# Patient Record
Sex: FEMALE | Race: WHITE | NOT HISPANIC OR LATINO | Employment: FULL TIME | URBAN - METROPOLITAN AREA
[De-identification: names, ages, dates, MRNs, and addresses within clinical notes are randomized per-mention and may not be internally consistent; named-entity substitution may affect disease eponyms.]

---

## 2017-05-12 ENCOUNTER — TRANSCRIBE ORDERS (OUTPATIENT)
Dept: ADMINISTRATIVE | Facility: HOSPITAL | Age: 27
End: 2017-05-12

## 2017-05-12 DIAGNOSIS — R94.6 NONSPECIFIC ABNORMAL RESULTS OF THYROID FUNCTION STUDY: Primary | ICD-10-CM

## 2017-05-16 ENCOUNTER — HOSPITAL ENCOUNTER (OUTPATIENT)
Dept: RADIOLOGY | Age: 27
Discharge: HOME | End: 2017-05-16
Payer: COMMERCIAL

## 2017-05-16 DIAGNOSIS — R94.6 NONSPECIFIC ABNORMAL RESULTS OF THYROID FUNCTION STUDY: ICD-10-CM

## 2017-05-16 PROCEDURE — 76536 US EXAM OF HEAD AND NECK: CPT

## 2017-05-19 ENCOUNTER — TRANSCRIBE ORDERS (OUTPATIENT)
Dept: ADMINISTRATIVE | Facility: HOSPITAL | Age: 27
End: 2017-05-19

## 2017-05-19 DIAGNOSIS — M79.7 RHEUMATISM, UNSPECIFIED AND FIBROSITIS: Primary | ICD-10-CM

## 2017-05-19 DIAGNOSIS — M79.0 RHEUMATISM, UNSPECIFIED AND FIBROSITIS: Primary | ICD-10-CM

## 2017-06-02 ENCOUNTER — HOSPITAL ENCOUNTER (OUTPATIENT)
Dept: SLEEP CENTER | Facility: CLINIC | Age: 27
Discharge: HOME | End: 2017-06-02
Payer: COMMERCIAL

## 2017-06-02 DIAGNOSIS — M79.7 RHEUMATISM, UNSPECIFIED AND FIBROSITIS: ICD-10-CM

## 2017-06-02 DIAGNOSIS — M79.0 RHEUMATISM, UNSPECIFIED AND FIBROSITIS: ICD-10-CM

## 2017-06-02 DIAGNOSIS — G47.33 OSA (OBSTRUCTIVE SLEEP APNEA): ICD-10-CM

## 2017-06-02 PROCEDURE — 95810 POLYSOM 6/> YRS 4/> PARAM: CPT

## 2017-06-05 ENCOUNTER — TRANSCRIBE ORDERS (OUTPATIENT)
Dept: SLEEP CENTER | Facility: CLINIC | Age: 27
End: 2017-06-05

## 2017-06-05 DIAGNOSIS — R06.83 SNORING: Primary | ICD-10-CM

## 2017-11-25 ENCOUNTER — HOSPITAL ENCOUNTER (INPATIENT)
Facility: HOSPITAL | Age: 27
LOS: 3 days | Discharge: HOME | DRG: 254 | End: 2017-11-30
Attending: EMERGENCY MEDICINE | Admitting: HOSPITALIST
Payer: COMMERCIAL

## 2017-11-25 ENCOUNTER — APPOINTMENT (EMERGENCY)
Dept: RADIOLOGY | Facility: HOSPITAL | Age: 27
DRG: 254 | End: 2017-11-25
Payer: COMMERCIAL

## 2017-11-25 DIAGNOSIS — K44.9 HIATAL HERNIA: ICD-10-CM

## 2017-11-25 DIAGNOSIS — R10.13 EPIGASTRIC PAIN: ICD-10-CM

## 2017-11-25 DIAGNOSIS — Z90.49 HISTORY OF CHOLECYSTECTOMY: ICD-10-CM

## 2017-11-25 DIAGNOSIS — R10.9 INTRACTABLE ABDOMINAL PAIN: Primary | ICD-10-CM

## 2017-11-25 DIAGNOSIS — R11.2 NAUSEA & VOMITING: ICD-10-CM

## 2017-11-25 DIAGNOSIS — F41.9 ANXIETY: ICD-10-CM

## 2017-11-25 DIAGNOSIS — R10.9 ABDOMINAL PAIN: ICD-10-CM

## 2017-11-25 PROBLEM — M79.7 FIBROMYALGIA MUSCLE PAIN: Chronic | Status: ACTIVE | Noted: 2017-11-25

## 2017-11-25 PROBLEM — L40.50 PSORIATIC ARTHRITIS (HCC): Chronic | Status: ACTIVE | Noted: 2017-11-25

## 2017-11-25 PROBLEM — N80.9 ENDOMETRIOSIS: Chronic | Status: ACTIVE | Noted: 2017-11-25

## 2017-11-25 LAB
ALBUMIN SERPL BCP-MCNC: 3.8 G/DL (ref 3.5–5)
ALP SERPL-CCNC: 56 U/L (ref 46–116)
ALT SERPL W P-5'-P-CCNC: 26 U/L (ref 12–78)
ANION GAP BLD CALC-SCNC: 14 MMOL/L (ref 4–13)
ANION GAP SERPL CALCULATED.3IONS-SCNC: 6 MMOL/L (ref 4–13)
AST SERPL W P-5'-P-CCNC: 10 U/L (ref 5–45)
BACTERIA UR QL AUTO: NORMAL /HPF
BASOPHILS # BLD AUTO: 0.04 THOUSANDS/ΜL (ref 0–0.1)
BASOPHILS NFR BLD AUTO: 1 % (ref 0–1)
BILIRUB SERPL-MCNC: 0.57 MG/DL (ref 0.2–1)
BILIRUB UR QL STRIP: NEGATIVE
BUN BLD-MCNC: 13 MG/DL (ref 5–25)
BUN SERPL-MCNC: 11 MG/DL (ref 5–25)
CA-I BLD-SCNC: 1.15 MMOL/L (ref 1.12–1.32)
CALCIUM SERPL-MCNC: 9.1 MG/DL (ref 8.3–10.1)
CHLORIDE BLD-SCNC: 105 MMOL/L (ref 100–108)
CHLORIDE SERPL-SCNC: 106 MMOL/L (ref 100–108)
CLARITY UR: CLEAR
CO2 SERPL-SCNC: 27 MMOL/L (ref 21–32)
COLOR UR: YELLOW
CREAT BLD-MCNC: 0.9 MG/DL (ref 0.6–1.3)
CREAT SERPL-MCNC: 0.81 MG/DL (ref 0.6–1.3)
EOSINOPHIL # BLD AUTO: 0.15 THOUSAND/ΜL (ref 0–0.61)
EOSINOPHIL NFR BLD AUTO: 2 % (ref 0–6)
ERYTHROCYTE [DISTWIDTH] IN BLOOD BY AUTOMATED COUNT: 13.8 % (ref 11.6–15.1)
EXT PREG TEST URINE: NEGATIVE
GFR SERPL CREATININE-BSD FRML MDRD: 100 ML/MIN/1.73SQ M
GFR SERPL CREATININE-BSD FRML MDRD: 88 ML/MIN/1.73SQ M
GLUCOSE SERPL-MCNC: 90 MG/DL (ref 65–140)
GLUCOSE SERPL-MCNC: 92 MG/DL (ref 65–140)
GLUCOSE UR STRIP-MCNC: NEGATIVE MG/DL
HCT VFR BLD AUTO: 42.7 % (ref 34.8–46.1)
HCT VFR BLD CALC: 44 % (ref 34.8–46.1)
HGB BLD-MCNC: 14.3 G/DL (ref 11.5–15.4)
HGB BLDA-MCNC: 15 G/DL (ref 11.5–15.4)
HGB UR QL STRIP.AUTO: ABNORMAL
HYALINE CASTS #/AREA URNS LPF: NORMAL /LPF
KETONES UR STRIP-MCNC: NEGATIVE MG/DL
LEUKOCYTE ESTERASE UR QL STRIP: NEGATIVE
LIPASE SERPL-CCNC: 152 U/L (ref 73–393)
LYMPHOCYTES # BLD AUTO: 2.83 THOUSANDS/ΜL (ref 0.6–4.47)
LYMPHOCYTES NFR BLD AUTO: 41 % (ref 14–44)
MCH RBC QN AUTO: 29.1 PG (ref 26.8–34.3)
MCHC RBC AUTO-ENTMCNC: 33.5 G/DL (ref 31.4–37.4)
MCV RBC AUTO: 87 FL (ref 82–98)
MONOCYTES # BLD AUTO: 0.36 THOUSAND/ΜL (ref 0.17–1.22)
MONOCYTES NFR BLD AUTO: 5 % (ref 4–12)
NEUTROPHILS # BLD AUTO: 3.57 THOUSANDS/ΜL (ref 1.85–7.62)
NEUTS SEG NFR BLD AUTO: 51 % (ref 43–75)
NITRITE UR QL STRIP: NEGATIVE
NON-SQ EPI CELLS URNS QL MICRO: NORMAL /HPF
NRBC BLD AUTO-RTO: 0 /100 WBCS
PCO2 BLD: 26 MMOL/L (ref 21–32)
PH UR STRIP.AUTO: 7 [PH] (ref 4.5–8)
PLATELET # BLD AUTO: 286 THOUSANDS/UL (ref 149–390)
PMV BLD AUTO: 9.7 FL (ref 8.9–12.7)
POTASSIUM BLD-SCNC: 4.3 MMOL/L (ref 3.5–5.3)
POTASSIUM SERPL-SCNC: 4 MMOL/L (ref 3.5–5.3)
PROT SERPL-MCNC: 7.5 G/DL (ref 6.4–8.2)
PROT UR STRIP-MCNC: NEGATIVE MG/DL
RBC # BLD AUTO: 4.91 MILLION/UL (ref 3.81–5.12)
RBC #/AREA URNS AUTO: NORMAL /HPF
SODIUM BLD-SCNC: 140 MMOL/L (ref 136–145)
SODIUM SERPL-SCNC: 139 MMOL/L (ref 136–145)
SP GR UR STRIP.AUTO: 1.01 (ref 1–1.03)
SPECIMEN SOURCE: ABNORMAL
UROBILINOGEN UR QL STRIP.AUTO: 0.2 E.U./DL
WBC # BLD AUTO: 6.98 THOUSAND/UL (ref 4.31–10.16)
WBC #/AREA URNS AUTO: NORMAL /HPF

## 2017-11-25 PROCEDURE — 83690 ASSAY OF LIPASE: CPT | Performed by: EMERGENCY MEDICINE

## 2017-11-25 PROCEDURE — 85014 HEMATOCRIT: CPT

## 2017-11-25 PROCEDURE — 81025 URINE PREGNANCY TEST: CPT | Performed by: EMERGENCY MEDICINE

## 2017-11-25 PROCEDURE — 81001 URINALYSIS AUTO W/SCOPE: CPT

## 2017-11-25 PROCEDURE — 96375 TX/PRO/DX INJ NEW DRUG ADDON: CPT

## 2017-11-25 PROCEDURE — 81002 URINALYSIS NONAUTO W/O SCOPE: CPT | Performed by: EMERGENCY MEDICINE

## 2017-11-25 PROCEDURE — 74177 CT ABD & PELVIS W/CONTRAST: CPT

## 2017-11-25 PROCEDURE — 96374 THER/PROPH/DIAG INJ IV PUSH: CPT

## 2017-11-25 PROCEDURE — 80047 BASIC METABLC PNL IONIZED CA: CPT

## 2017-11-25 PROCEDURE — 80053 COMPREHEN METABOLIC PANEL: CPT | Performed by: EMERGENCY MEDICINE

## 2017-11-25 PROCEDURE — 36415 COLL VENOUS BLD VENIPUNCTURE: CPT | Performed by: EMERGENCY MEDICINE

## 2017-11-25 PROCEDURE — 85025 COMPLETE CBC W/AUTO DIFF WBC: CPT | Performed by: EMERGENCY MEDICINE

## 2017-11-25 PROCEDURE — 99285 EMERGENCY DEPT VISIT HI MDM: CPT

## 2017-11-25 RX ORDER — POLYETHYLENE GLYCOL 3350 17 G/17G
17 POWDER, FOR SOLUTION ORAL DAILY
Status: DISCONTINUED | OUTPATIENT
Start: 2017-11-26 | End: 2017-11-27

## 2017-11-25 RX ORDER — CYCLOBENZAPRINE HCL 5 MG
10 TABLET ORAL 3 TIMES DAILY PRN
COMMUNITY
End: 2017-12-02 | Stop reason: HOSPADM

## 2017-11-25 RX ORDER — ALPRAZOLAM 0.5 MG/1
1 TABLET ORAL 3 TIMES DAILY PRN
Status: DISCONTINUED | OUTPATIENT
Start: 2017-11-25 | End: 2017-11-26

## 2017-11-25 RX ORDER — MOMETASONE FUROATE 1 MG/G
1 CREAM TOPICAL 2 TIMES DAILY
COMMUNITY
End: 2019-10-23 | Stop reason: ALTCHOICE

## 2017-11-25 RX ORDER — DULOXETIN HYDROCHLORIDE 60 MG/1
60 CAPSULE, DELAYED RELEASE ORAL DAILY
Status: DISCONTINUED | OUTPATIENT
Start: 2017-11-26 | End: 2017-11-30 | Stop reason: HOSPADM

## 2017-11-25 RX ORDER — ONDANSETRON 2 MG/ML
4 INJECTION INTRAMUSCULAR; INTRAVENOUS ONCE
Status: COMPLETED | OUTPATIENT
Start: 2017-11-25 | End: 2017-11-25

## 2017-11-25 RX ORDER — HEPARIN SODIUM 5000 [USP'U]/ML
5000 INJECTION, SOLUTION INTRAVENOUS; SUBCUTANEOUS EVERY 8 HOURS SCHEDULED
Status: DISCONTINUED | OUTPATIENT
Start: 2017-11-25 | End: 2017-11-30 | Stop reason: HOSPADM

## 2017-11-25 RX ORDER — DULOXETIN HYDROCHLORIDE 60 MG/1
60 CAPSULE, DELAYED RELEASE ORAL DAILY
Status: ON HOLD | COMMUNITY
End: 2022-03-18 | Stop reason: ALTCHOICE

## 2017-11-25 RX ORDER — PANTOPRAZOLE SODIUM 40 MG/1
40 TABLET, DELAYED RELEASE ORAL
Status: DISCONTINUED | OUTPATIENT
Start: 2017-11-26 | End: 2017-11-26

## 2017-11-25 RX ORDER — FOLIC ACID 1 MG/1
1 TABLET ORAL DAILY
Status: DISCONTINUED | OUTPATIENT
Start: 2017-11-26 | End: 2017-11-30 | Stop reason: HOSPADM

## 2017-11-25 RX ORDER — ONDANSETRON 2 MG/ML
INJECTION INTRAMUSCULAR; INTRAVENOUS
Status: DISPENSED
Start: 2017-11-25 | End: 2017-11-26

## 2017-11-25 RX ORDER — MOMETASONE FUROATE 1 MG/G
1 CREAM TOPICAL DAILY
Status: DISCONTINUED | OUTPATIENT
Start: 2017-11-26 | End: 2017-11-30 | Stop reason: HOSPADM

## 2017-11-25 RX ORDER — DIAZEPAM 5 MG/1
5 TABLET ORAL EVERY 8 HOURS PRN
Status: DISCONTINUED | OUTPATIENT
Start: 2017-11-25 | End: 2017-11-30 | Stop reason: HOSPADM

## 2017-11-25 RX ORDER — PROMETHAZINE HYDROCHLORIDE 25 MG/ML
12.5 INJECTION, SOLUTION INTRAMUSCULAR; INTRAVENOUS ONCE
Status: COMPLETED | OUTPATIENT
Start: 2017-11-25 | End: 2017-11-25

## 2017-11-25 RX ORDER — PREGABALIN 100 MG/1
100 CAPSULE ORAL 2 TIMES DAILY
COMMUNITY
End: 2019-10-31 | Stop reason: ALTCHOICE

## 2017-11-25 RX ORDER — KETOROLAC TROMETHAMINE 30 MG/ML
15 INJECTION, SOLUTION INTRAMUSCULAR; INTRAVENOUS ONCE
Status: COMPLETED | OUTPATIENT
Start: 2017-11-25 | End: 2017-11-25

## 2017-11-25 RX ORDER — MELATONIN
1000 DAILY
Status: ON HOLD | COMMUNITY
End: 2022-03-18 | Stop reason: ALTCHOICE

## 2017-11-25 RX ORDER — ALPRAZOLAM 1 MG/1
1 TABLET ORAL 3 TIMES DAILY PRN
COMMUNITY
End: 2017-12-02 | Stop reason: HOSPADM

## 2017-11-25 RX ORDER — PREGABALIN 100 MG/1
100 CAPSULE ORAL 2 TIMES DAILY
Status: DISCONTINUED | OUTPATIENT
Start: 2017-11-25 | End: 2017-11-30 | Stop reason: HOSPADM

## 2017-11-25 RX ORDER — FOLIC ACID 1 MG/1
1 TABLET ORAL DAILY
Status: ON HOLD | COMMUNITY
End: 2022-03-18 | Stop reason: ALTCHOICE

## 2017-11-25 RX ORDER — OMEPRAZOLE 40 MG/1
40 CAPSULE, DELAYED RELEASE ORAL DAILY
Status: ON HOLD | COMMUNITY
End: 2022-03-18 | Stop reason: ALTCHOICE

## 2017-11-25 RX ORDER — ONDANSETRON 2 MG/ML
4 INJECTION INTRAMUSCULAR; INTRAVENOUS EVERY 8 HOURS PRN
Status: DISCONTINUED | OUTPATIENT
Start: 2017-11-25 | End: 2017-11-26

## 2017-11-25 RX ORDER — ACETAMINOPHEN 325 MG/1
650 TABLET ORAL EVERY 6 HOURS PRN
Status: DISCONTINUED | OUTPATIENT
Start: 2017-11-25 | End: 2017-11-26

## 2017-11-25 RX ORDER — MELATONIN
1000 DAILY
Status: DISCONTINUED | OUTPATIENT
Start: 2017-11-26 | End: 2017-11-30 | Stop reason: HOSPADM

## 2017-11-25 RX ORDER — DIAZEPAM 5 MG/1
5 TABLET ORAL EVERY 8 HOURS PRN
COMMUNITY
End: 2017-12-02 | Stop reason: HOSPADM

## 2017-11-25 RX ORDER — CLONAZEPAM 0.5 MG/1
0.5 TABLET ORAL 2 TIMES DAILY PRN
Status: ON HOLD | COMMUNITY
End: 2022-03-18 | Stop reason: ALTCHOICE

## 2017-11-25 RX ORDER — CYCLOBENZAPRINE HCL 10 MG
10 TABLET ORAL 3 TIMES DAILY PRN
Status: DISCONTINUED | OUTPATIENT
Start: 2017-11-25 | End: 2017-11-30 | Stop reason: HOSPADM

## 2017-11-25 RX ADMIN — ALPRAZOLAM 1 MG: 0.5 TABLET ORAL at 19:17

## 2017-11-25 RX ADMIN — PROMETHAZINE HYDROCHLORIDE 12.5 MG: 25 INJECTION INTRAMUSCULAR; INTRAVENOUS at 22:04

## 2017-11-25 RX ADMIN — KETOROLAC TROMETHAMINE 15 MG: 30 INJECTION, SOLUTION INTRAMUSCULAR at 16:48

## 2017-11-25 RX ADMIN — ONDANSETRON 4 MG: 2 INJECTION INTRAMUSCULAR; INTRAVENOUS at 12:56

## 2017-11-25 RX ADMIN — HYDROMORPHONE HYDROCHLORIDE 0.5 MG: 1 INJECTION, SOLUTION INTRAMUSCULAR; INTRAVENOUS; SUBCUTANEOUS at 19:23

## 2017-11-25 RX ADMIN — ONDANSETRON 4 MG: 2 INJECTION INTRAMUSCULAR; INTRAVENOUS at 14:48

## 2017-11-25 RX ADMIN — HEPARIN SODIUM 5000 UNITS: 5000 INJECTION, SOLUTION INTRAVENOUS; SUBCUTANEOUS at 19:26

## 2017-11-25 RX ADMIN — PREGABALIN 100 MG: 100 CAPSULE ORAL at 19:17

## 2017-11-25 RX ADMIN — HYDROMORPHONE HYDROCHLORIDE 0.5 MG: 1 INJECTION, SOLUTION INTRAMUSCULAR; INTRAVENOUS; SUBCUTANEOUS at 12:56

## 2017-11-25 RX ADMIN — ONDANSETRON 4 MG: 2 INJECTION INTRAMUSCULAR; INTRAVENOUS at 19:21

## 2017-11-25 RX ADMIN — HYDROMORPHONE HYDROCHLORIDE 0.5 MG: 1 INJECTION, SOLUTION INTRAMUSCULAR; INTRAVENOUS; SUBCUTANEOUS at 14:26

## 2017-11-25 RX ADMIN — IOHEXOL 100 ML: 350 INJECTION, SOLUTION INTRAVENOUS at 14:32

## 2017-11-25 RX ADMIN — HYDROMORPHONE HYDROCHLORIDE 0.5 MG: 1 INJECTION, SOLUTION INTRAMUSCULAR; INTRAVENOUS; SUBCUTANEOUS at 23:19

## 2017-11-25 NOTE — ED PROVIDER NOTES
History  Chief Complaint   Patient presents with    Abdominal Pain     Pt  comes to the ER for evaluation of upper epigastric abdominal pain that woke her up this morning; pt  reports nausea and pain with eating and drinking but denies acutally vomiting; pt  has extensive h/o of 2 years of being evaluated for abdominal pain with no exact cause; pt  has had pancreatic stents, galllbladder removal, feeding tube, etc, with no relief; pt  also recentely dx with fibromyalgia and arthritis      HPI    59-year-old female with history of cholecystectomy  Secondary to gangrenous gallbladder, GERDm pancreatic and biliary stent placement suture removed last year,  Presenting for evaluation of epigastric abdominal pain that began 1 week ago  Patient was seen at [facility] for epigastric abdominal pain and nausea/vomiting,   Discharged with Protonix  Patient states pain did not resolve, still has epigastric pain nausea vomiting  Denies any blood when she vomits, denies diarrhea or blood in the stool  Says Protonix has not been helping  No longer has a GI physician  Denies fevers chills chest pain shortness of breath vaginal discharge  Was recent diagnosis of reactive arthritis is on methotrexate  Unable to take Tylenol Motrin  Prior to Admission Medications   Prescriptions Last Dose Informant Patient Reported? Taking?    ALPRAZolam (XANAX) 1 mg tablet 11/25/2017 at Unknown time  Yes Yes   Sig: Take 1 mg by mouth 3 (three) times a day as needed for anxiety   DULoxetine (CYMBALTA) 60 mg delayed release capsule 11/25/2017 at Unknown time  Yes Yes   Sig: Take 60 mg by mouth daily   cholecalciferol (VITAMIN D3) 1,000 units tablet 11/25/2017 at Unknown time  Yes Yes   Sig: Take 1,000 Units by mouth daily   clonazePAM (KlonoPIN) 0 5 mg tablet 11/25/2017 at Unknown time  Yes Yes   Sig: Take 0 5 mg by mouth 2 (two) times a day as needed for seizures   cyclobenzaprine (FLEXERIL) 5 mg tablet 11/25/2017 at Unknown time Yes Yes   Sig: Take 10 mg by mouth 3 (three) times a day as needed for muscle spasms   diazepam (VALIUM) 5 mg tablet 11/25/2017 at Unknown time  Yes Yes   Sig: Take 5 mg by mouth every 8 (eight) hours as needed for anxiety   folic acid (FOLVITE) 1 mg tablet 11/25/2017 at Unknown time  Yes Yes   Sig: Take 1 mg by mouth daily   methotrexate 2 5 mg tablet 11/25/2017 at Unknown time  Yes Yes   Sig: Take 2 5 mg by mouth once a week   mometasone (ELOCON) 0 1 % cream 11/25/2017 at Unknown time  Yes Yes   Sig: Apply 1 application topically 2 (two) times a day   norethindrone (AYGESTIN) 5 mg tablet 11/25/2017 at Unknown time  Yes Yes   Sig: Take 5 mg by mouth daily   omeprazole (PriLOSEC) 40 MG capsule 11/25/2017 at Unknown time  Yes Yes   Sig: Take 40 mg by mouth daily   pregabalin (LYRICA) 100 mg capsule 11/25/2017 at Unknown time  Yes Yes   Sig: Take 100 mg by mouth 2 (two) times a day      Facility-Administered Medications: None       Past Medical History:   Diagnosis Date    Abdominal discomfort     Anxiety     Endometritis     Fibromyalgia     Psoriatic arthritis ([facility] [address] )        Past Surgical History:   Procedure Laterality Date    ABDOMINAL SURGERY         History reviewed  No pertinent family history  I have reviewed and agree with the history as documented  Social History   Substance Use Topics    Smoking status: Never Smoker    Smokeless tobacco: Never Used    Alcohol use No        Review of Systems   Constitutional: Negative for chills, fatigue and fever  HENT: Negative for sore throat  Eyes: Negative for redness and visual disturbance  Respiratory: Negative for shortness of breath  Cardiovascular: Negative for chest pain  Gastrointestinal: Positive for abdominal pain, nausea and vomiting  Negative for anal bleeding, blood in stool, constipation and diarrhea  Genitourinary: Negative for difficulty urinating, dysuria and pelvic pain  Musculoskeletal: Negative for back pain     Skin: Negative for rash  Neurological: Negative for syncope, weakness and headaches  All other systems reviewed and are negative  Physical Exam  ED Triage Vitals [11/25/17 1213]   Temperature Pulse Respirations Blood Pressure SpO2   98 °F (36 7 °C) 102 20 133/84 98 %      Temp Source Heart Rate Source Patient Position - Orthostatic VS BP Location FiO2 (%)   Oral Monitor Lying Right arm --      Pain Score       Worst Possible Pain           Orthostatic Vital Signs  Vitals:    11/25/17 1400 11/25/17 1526 11/25/17 1600 11/25/17 1630   BP: 138/92 130/85 139/85 132/83   Pulse: 94 88 94 96   Patient Position - Orthostatic VS: Lying Lying  Sitting       Physical Exam   Constitutional: She is oriented to person, place, and time  She appears well-developed and well-nourished  No distress  HENT:   Head: Normocephalic and atraumatic  Eyes: Conjunctivae and EOM are normal  Pupils are equal, round, and reactive to light  Cardiovascular: Normal rate, regular rhythm and normal heart sounds  No murmur heard  Pulmonary/Chest: No respiratory distress  Abdominal: Soft  Bowel sounds are normal  There is tenderness  Tenderness in epigastric region  Patient also has suprapubic tenderness  Musculoskeletal: Normal range of motion  Neurological: She is alert and oriented to person, place, and time  No cranial nerve deficit or sensory deficit  She exhibits normal muscle tone  Coordination normal    Skin: Skin is warm and dry  Capillary refill takes less than 2 seconds  No rash noted  Psychiatric: She has a normal mood and affect  Nursing note and vitals reviewed        ED Medications  Medications    EMS REPLENISHMENT MED (not administered)   ondansetron (ZOFRAN) 4 mg/2 mL injection **AcuDose Override Pull** (not administered)   heparin (porcine) subcutaneous injection 5,000 Units (not administered)   acetaminophen (TYLENOL) tablet 650 mg (not administered)   ondansetron (ZOFRAN) injection 4 mg (4 mg Intravenous Given 11/25/17 1256)   HYDROmorphone (DILAUDID) 1 mg/mL injection 0 5 mg (0 5 mg Intravenous Given 11/25/17 1256)   HYDROmorphone (DILAUDID) 1 mg/mL injection 0 5 mg (0 5 mg Intravenous Given 11/25/17 1426)   iohexol (OMNIPAQUE) 350 MG/ML injection (SINGLE-DOSE) 100 mL (100 mL Intravenous Given 11/25/17 1432)   ondansetron (ZOFRAN) injection 4 mg (4 mg Intravenous Given 11/25/17 1448)   ketorolac (TORADOL) 30 mg/mL injection 15 mg (15 mg Intravenous Given 11/25/17 1648)       Diagnostic Studies  Results Reviewed     Procedure Component Value Units Date/Time    Platelet count [[id number]]     Lab Status:  No result Specimen:  Blood     Urine Microscopic [[id number]]  (Normal) Collected:  11/25/17 1419    Lab Status:  Final result Specimen:  Urine from Urine, Clean Catch Updated:  11/25/17 1436     RBC, UA None Seen /hpf      WBC, UA None Seen /hpf      Epithelial Cells None Seen /hpf      Bacteria, UA None Seen /hpf      Hyaline Casts, UA None Seen /lpf     POCT urinalysis dipstick [[id number]]  (Abnormal) Resulted:  11/25/17 1423    Lab Status:  Final result Specimen:  Urine Updated:  11/25/17 1423    POCT pregnancy, urine [[id number]]  (Normal) Resulted:  11/25/17 1423    Lab Status:  Final result Specimen:  Urine Updated:  11/25/17 1423     EXT PREG TEST UR (Ref: Negative) Negative    ED Urine Macroscopic [[id number]]  (Abnormal) Collected:  11/25/17 1419    Lab Status:  Final result Specimen:  Urine Updated:  11/25/17 1421     Color, UA Yellow     Clarity, UA Clear     pH, UA 7 0     Leukocytes, UA Negative     Nitrite, UA Negative     Protein, UA Negative mg/dl      Glucose, UA Negative mg/dl      Ketones, UA Negative mg/dl      Urobilinogen, UA 0 2 E U /dl      Bilirubin, UA Negative     Blood, UA Trace (A)     Specific [place], UA 1 015    Narrative:       CLINITEK RESULT    Comprehensive metabolic panel [[id number]] Collected:  11/25/17 131    Lab Status:  Final result Specimen:  Blood from Arm, Right Updated:  11/25/17 1350 Sodium 139 mmol/L      Potassium 4 0 mmol/L      Chloride 106 mmol/L      CO2 27 mmol/L      Anion Gap 6 mmol/L      BUN 11 mg/dL      Creatinine 0 81 mg/dL      Glucose 90 mg/dL      Calcium 9 1 mg/dL      AST 10 U/L      ALT 26 U/L      Alkaline Phosphatase 56 U/L      Total Protein 7 5 g/dL      Albumin 3 8 g/dL      Total Bilirubin 0 57 mg/dL      eGFR 100 ml/min/1 73sq m     Narrative:         National Kidney Disease Education Program recommendations are as follows:  GFR calculation is accurate only with a steady state creatinine  Chronic Kidney disease less than 60 ml/min/1 73 sq  meters  Kidney failure less than 15 ml/min/1 73 sq  meters      Lipase [[id number]]  (Normal) Collected:  11/25/17 1314    Lab Status:  Final result Specimen:  Blood from Arm, Right Updated:  11/25/17 1350     Lipase 152 u/L     CBC and differential [[id number]]  (Normal) Collected:  11/25/17 1314    Lab Status:  Final result Specimen:  Blood from Arm, Right Updated:  11/25/17 1328     WBC 6 98 Thousand/uL      RBC 4 91 Million/uL      Hemoglobin 14 3 g/dL      Hematocrit 42 7 %      MCV 87 fL      MCH 29 1 pg      MCHC 33 5 g/dL      RDW 13 8 %      MPV 9 7 fL      Platelets 231 Thousands/uL      nRBC 0 /100 WBCs      Neutrophils Relative 51 %      Lymphocytes Relative 41 %      Monocytes Relative 5 %      Eosinophils Relative 2 %      Basophils Relative 1 %      Neutrophils Absolute 3 57 Thousands/µL      Lymphocytes Absolute 2 83 Thousands/µL      Monocytes Absolute 0 36 Thousand/µL      Eosinophils Absolute 0 15 Thousand/µL      Basophils Absolute 0 04 Thousands/µL     POCT Chem 8+ [[id number]]  (Abnormal) Collected:  11/25/17 1318    Lab Status:  Final result Updated:  11/25/17 1323     SODIUM, I-STAT 140 mmol/l      Potassium, i-STAT 4 3 mmol/L      Chloride, istat 105 mmol/L      CO2, i-STAT 26 mmol/L      Anion Gap, Istat 14 (H) mmol/L      Calcium, Ionized i-STAT 1 15 mmol/L      BUN, I-STAT 13 mg/dl      Creatinine, i-STAT 0 9 mg/dl      eGFR 88 ml/min/1 73sq m      Glucose, i-STAT 92 mg/dl      Hct, i-STAT 44 %      Hgb, i-STAT 15 0 g/dl      Specimen Type VENOUS                 CT abdomen pelvis with contrast   Final Result by MAR Werner MD (11/25 1457)      Mild fecal stasis  Small hepatic lesion, too small to further characterize, possibly a cyst or hemangioma  Workstation performed: [id number]               Procedures  Procedures      Phone Consults  ED Phone Contact    ED Course  ED Course            MDM  CritCare Time    14-year-old female with history of gangrenous gallbladder and biliary and pancreatic duct dilation with stent removal last year presenting for epigastric abdominal pain and nausea vomiting  Abdomen tender in epigastric region  Pain not relieved with opioids  CBC BMP LFTs lipase within normal limits  CT abdomen with contrast significant for fecal stasis  Unlikely to be small bowel obstruction, pancreatitis, appendicitis  Will admit to [facility] for intractable pain and further GI workup  Disposition  Final diagnoses:   Intractable abdominal pain   History of cholecystectomy   Abdominal pain   Nausea & vomiting     Time reflects when diagnosis was documented in both MDM as applicable and the Disposition within this note     Time User Action Codes Description Comment    11/25/2017  4:33 PM Kristal Pleas Add [R10 9] Intractable abdominal pain     11/25/2017  4:37 PM Kristal Pleas Add [Z90 49] History of cholecystectomy     11/25/2017  4:38 PM Kristal Pleas Add [R10 9] Abdominal pain     11/25/2017  4:38 PM Kristal Pleas Add [R11 2] Nausea & vomiting       ED Disposition     ED Disposition Condition Comment    Admit  Case was discussed with [facility] and the patient's admission status was agreed to be Admission Status: observation status to the service of Dr García Oliver          Follow-up Information    None       Patient's Medications   Discharge Prescriptions    No medications on file     No discharge procedures on file  ED Provider  Attending physically available and evaluated Trini Schwab I managed the patient along with the ED Attending      Electronically Signed by         Sola Melara MD  Resident  11/25/17 8925

## 2017-11-25 NOTE — H&P
History and Physical - [facility] Internal Medicine    Patient Information: Brock Valentin 32 y o  female MRN: [id number]  Unit/Bed#: 2 212-01 Encounter: [id number]  Admitting Physician: Esperanza Grace MD  PCP: Brennan Rubinstein, MD  Date of Admission:  11/25/17    Assessment/Plan:    Hospital Problem List:     Principal Problem:    Epigastric pain  Active Problems:    Psoriatic arthritis ([address] )    Endometriosis    Fibromyalgia muscle pain    History of cholecystectomy      Plan for the Primary Problem(s):  · Epigastric pain CT scan suggestive of hepatic lesion possible early hepatic cyst or hemangioma  · Vital signs monitoring:  Currently she remains afebrile, with tachycardia & elevated blood pressure when she is anxious  · CBC monitoring H&H  · Gastroenterology consult  · Could be a sign of psychological disorder:  Due to extensive history of fibromyalgia current, chronic pain syndrome:  Endometriosis/pelvic pain syndrome  · All labs are unremarkable for this admission  · Possibility of acute gastritis due to recent history of steroid use  · Will continue PPI  · Pain control   · Continue observation for patient   · Fecal stasis found on CT- will     Plan for Additional Problems:   Fibromyalgia-continue med  Endometriosis-continue meds  Arthritis-continue meds    VTE Prophylaxis: Heparin  / sequential compression device   Code Status: full  POLST: POLST is not applicable to this patient    Anticipated Length of Stay:  Patient will be admitted on an Observation basis with an anticipated length of stay of  < 2 midnights  Justification for Hospital Stay: medical observation  Total Time for Visit, including Counseling / Coordination of Care: 30 minutes  Greater than 50% of this total time spent on direct patient counseling and coordination of care      Chief Complaint:  Abdominal pain  History of Present Illness:    Brock Valentin is a 32 y o  female who presents with epigastric abdominal pain, which started Thursday located under the root on the left side of the chest wall with difficulty breathing especially around the diaphragm area  Pain is worsened with eating and drinking water with no alleviating symptoms  Multiple episodes in the past 3 x 1 in 2014 where it was found to have cholecystitis diet is with gangrene 2nd she had stents placed in her gallbladder liver area in 2016 and now she presented to the ER today  She was seen by Gastroenterology and was found to have gastritis in the past   Patient has a past medical history of having fibromyalgia, endometriosis,  psoriatic arthritis for which she takes methotrexate weekly and was slowly tapered off prednisone due to stomach discomfort  Review of systems negative for fever however she admitted to having chills, along with pleuritic chest pain nausea but no vomiting  She denied any diarrhea or constipation, night sweats, skin rash, hair loss, changes in vision, weakness, paresthesia, in the lower extremities  Review of systems negative otherwise  Review of Systems:    Review of Systems   Constitutional: Positive for chills  HENT: Negative  Eyes: Negative  Respiratory: Negative  Cardiovascular: Negative  Gastrointestinal: Positive for abdominal pain and nausea  Genitourinary: Positive for dyspareunia  Musculoskeletal: Negative  Skin: Negative  Neurological: Negative  Hematological: Negative  Psychiatric/Behavioral: The patient is nervous/anxious  Past Medical and Surgical History:     Past Medical History:   Diagnosis Date    Abdominal discomfort     Anxiety     Endometritis     Fibromyalgia     Psoriatic arthritis ([facility] [address] )        Past Surgical History:   Procedure Laterality Date    ABDOMINAL SURGERY         Meds/Allergies:    Prior to Admission medications    Medication Sig Start Date End Date Taking?  Authorizing Provider   ALPRAZolam Slime Kenny) 1 mg tablet Take 1 mg by mouth 3 (three) times a day as needed for anxiety   Yes Historical Provider, MD   cholecalciferol (VITAMIN D3) 1,000 units tablet Take 1,000 Units by mouth daily   Yes Historical Provider, MD   clonazePAM (KlonoPIN) 0 5 mg tablet Take 0 5 mg by mouth 2 (two) times a day as needed for seizures   Yes Historical Provider, MD   cyclobenzaprine (FLEXERIL) 5 mg tablet Take 10 mg by mouth 3 (three) times a day as needed for muscle spasms   Yes Historical Provider, MD   diazepam (VALIUM) 5 mg tablet Take 5 mg by mouth every 8 (eight) hours as needed for anxiety   Yes Historical Provider, MD   DULoxetine (CYMBALTA) 60 mg delayed release capsule Take 60 mg by mouth daily   Yes Historical Provider, MD   folic acid (FOLVITE) 1 mg tablet Take 1 mg by mouth daily   Yes Historical Provider, MD   methotrexate 2 5 mg tablet Take 2 5 mg by mouth once a week   Yes Historical Provider, MD   mometasone (ELOCON) 0 1 % cream Apply 1 application topically 2 (two) times a day   Yes Historical Provider, MD   norethindrone (AYGESTIN) 5 mg tablet Take 5 mg by mouth daily   Yes Historical Provider, MD   omeprazole (PriLOSEC) 40 MG capsule Take 40 mg by mouth daily   Yes Historical Provider, MD   pregabalin (LYRICA) 100 mg capsule Take 100 mg by mouth 2 (two) times a day   Yes Historical Provider, MD     I have reviewed home medications with patient personally      Allergies: No Known Allergies    Social History:     Marital Status: Single   Occupation: unemployed  Patient Pre-hospital Living Situation: lives with fiance  Patient Pre-hospital Level of Mobility: full  Patient Pre-hospital Diet Restrictions: none  Substance Use History:   History   Alcohol Use No     History   Smoking Status    Never Smoker   Smokeless Tobacco    Never Used     History   Drug Use No       Family History:    Grandmother with arthritis, unsure of type    Physical Exam:     Vitals:   Blood Pressure: 133/86 (11/25/17 1826)  Pulse: 85 (11/25/17 1826)  Temperature: 98 °F (36 7 °C) (11/25/17 1213)  Temp Source: Oral (11/25/17 1213)  Respirations: 18 (11/25/17 1826)  Height: 5' 6" (167 6 cm) (11/25/17 1214)  Weight - Scale: 76 7 kg (169 lb) (11/25/17 1214)  SpO2: 98 % (11/25/17 1826)    Physical Exam   Constitutional: She is oriented to person, place, and time  She appears well-developed and well-nourished  HENT:   Head: Normocephalic and atraumatic  Mouth/Throat: Oropharynx is clear and moist    Eyes: Conjunctivae and EOM are normal  Pupils are equal, round, and reactive to light  No scleral icterus  Neck: Normal range of motion  Neck supple  No thyromegaly present  Cardiovascular: Normal heart sounds  Exam reveals no gallop and no friction rub  No murmur heard  Tachycardia   Pulmonary/Chest: Effort normal and breath sounds normal  No respiratory distress  She has no wheezes  She has no rales  She exhibits no tenderness  Abdominal: Soft  She exhibits no mass  There is tenderness  There is guarding  Guarding on the left upper quadrant and left lower quadrant  Tenderness on the left upper quadrant and epigastric region   Musculoskeletal: Normal range of motion  She exhibits no edema  Neurological: She is alert and oriented to person, place, and time  She has normal reflexes  Skin: Skin is warm and dry  Psychiatric:   Anxious           Additional Data:     Lab Results: I have personally reviewed pertinent reports          Results from last 7 days  Lab Units 11/25/17  1318 11/25/17  1314   WBC Thousand/uL  --  6 98   HEMOGLOBIN g/dL  --  14 3   I STAT HEMOGLOBIN g/dl 15 0  --    HEMATOCRIT %  --  42 7   PLATELETS Thousands/uL  --  286   NEUTROS PCT %  --  51   LYMPHS PCT %  --  41   MONOS PCT %  --  5   EOS PCT %  --  2       Results from last 7 days  Lab Units 11/25/17  1318 11/25/17  1314   SODIUM mmol/L  --  139   POTASSIUM mmol/L  --  4 0   CHLORIDE mmol/L  --  106   CO2 mmol/L  --  27   BUN mg/dL  --  11   CREATININE mg/dL  --  0 81   CALCIUM mg/dL  --  9 1   TOTAL PROTEIN g/dL  --  7 5   BILIRUBIN TOTAL mg/dL  --  0 57   ALK PHOS U/L  --  56   ALT U/L  --  26   AST U/L  --  10   GLUCOSE RANDOM mg/dL  --  90   GLUCOSE, ISTAT mg/dl 92  --            Imaging: I have personally reviewed pertinent reports  Ct Abdomen Pelvis With Contrast    Result Date: 11/25/2017  Narrative: CT ABDOMEN AND PELVIS WITH IV CONTRAST INDICATION:  Epigastric abdominal pain  Abdominal Pain (Pt  comes to the ER for evaluation of upper epigastric abdominal pain that woke her up this morning; pt  reports nausea and pain with eating and drinking but denies actually vomiting; pt  has extensive h/o of 2 years of being evaluated for abdominal pain with no exact cause; pt  has had pancreatic stents, galllbladder removal, feeding tube, etc, with no relief; pt  also recently dx with fibromyalgia and arthritis COMPARISON: July 13, 2011 TECHNIQUE:  CT examination of the abdomen and pelvis was performed  Reformatted images were created in axial, sagittal, and coronal planes  Radiation dose length product (DLP) for this visit:  437 03 mGy-cm   This examination, like all CT scans performed in the [facility], was performed utilizing techniques to minimize radiation dose exposure, including the use of iterative  reconstruction and automated exposure control  IV Contrast:  100 mL of iohexol (OMNIPAQUE)     Enteric Contrast:  Enteric contrast was not administered  FINDINGS: ABDOMEN LOWER CHEST:  No significant abnormalities identified in the lower chest  LIVER/BILIARY TREE:  Normal in size and contour without dilated biliary ducts  11 x 8 x 8 mm ovoid well-demarcated lesion of diminished attenuation in the left hepatic lobe not evident on previous study to small to further characterize, possibly a cyst or hemangioma  GALLBLADDER:  No calcified gallstones  No pericholecystic inflammatory change  SPLEEN:  Unremarkable  PANCREAS:  Unremarkable  ADRENAL GLANDS:  Unremarkable  KIDNEYS/URETERS:  Unremarkable  No hydronephrosis   STOMACH AND BOWEL:  Stomach and small bowel unremarkable  Gas and feces present throughout the colon, especially right hemicolon  APPENDIX:  No findings to suggest appendicitis  ABDOMINOPELVIC CAVITY:  No ascites or free intraperitoneal air  No lymphadenopathy  VESSELS:  Unremarkable for patient's age  PELVIS REPRODUCTIVE ORGANS:  Unremarkable for patient's age  URINARY BLADDER:  Unremarkable  ABDOMINAL WALL/INGUINAL REGIONS:  Unremarkable  OSSEOUS STRUCTURES:  No acute fracture or destructive osseous lesion  Impression: Mild fecal stasis  Small hepatic lesion, too small to further characterize, possibly a cyst or hemangioma  Workstation performed: [id number]       EKG, Pathology, and Other Studies Reviewed on Admission:   · EKG    Allscripts / Epic Records Reviewed: Yes     ** Please Note: This note has been constructed using a voice recognition system   **

## 2017-11-26 LAB
ALBUMIN SERPL BCP-MCNC: 3.6 G/DL (ref 3.5–5)
ALP SERPL-CCNC: 50 U/L (ref 46–116)
ALT SERPL W P-5'-P-CCNC: 27 U/L (ref 12–78)
ANION GAP SERPL CALCULATED.3IONS-SCNC: 5 MMOL/L (ref 4–13)
AST SERPL W P-5'-P-CCNC: 16 U/L (ref 5–45)
BILIRUB SERPL-MCNC: 0.85 MG/DL (ref 0.2–1)
BUN SERPL-MCNC: 12 MG/DL (ref 5–25)
CALCIUM SERPL-MCNC: 8.9 MG/DL (ref 8.3–10.1)
CHLORIDE SERPL-SCNC: 107 MMOL/L (ref 100–108)
CO2 SERPL-SCNC: 28 MMOL/L (ref 21–32)
CREAT SERPL-MCNC: 1.06 MG/DL (ref 0.6–1.3)
ERYTHROCYTE [DISTWIDTH] IN BLOOD BY AUTOMATED COUNT: 14 % (ref 11.6–15.1)
GFR SERPL CREATININE-BSD FRML MDRD: 72 ML/MIN/1.73SQ M
GLUCOSE P FAST SERPL-MCNC: 77 MG/DL (ref 65–99)
GLUCOSE SERPL-MCNC: 77 MG/DL (ref 65–140)
HCT VFR BLD AUTO: 41.9 % (ref 34.8–46.1)
HGB BLD-MCNC: 14 G/DL (ref 11.5–15.4)
MAGNESIUM SERPL-MCNC: 2.4 MG/DL (ref 1.6–2.6)
MCH RBC QN AUTO: 29.4 PG (ref 26.8–34.3)
MCHC RBC AUTO-ENTMCNC: 33.4 G/DL (ref 31.4–37.4)
MCV RBC AUTO: 88 FL (ref 82–98)
PHOSPHATE SERPL-MCNC: 4.5 MG/DL (ref 2.7–4.5)
PLATELET # BLD AUTO: 275 THOUSANDS/UL (ref 149–390)
PMV BLD AUTO: 9.8 FL (ref 8.9–12.7)
POTASSIUM SERPL-SCNC: 4.2 MMOL/L (ref 3.5–5.3)
PROT SERPL-MCNC: 7.1 G/DL (ref 6.4–8.2)
RBC # BLD AUTO: 4.77 MILLION/UL (ref 3.81–5.12)
SODIUM SERPL-SCNC: 140 MMOL/L (ref 136–145)
WBC # BLD AUTO: 7.51 THOUSAND/UL (ref 4.31–10.16)

## 2017-11-26 PROCEDURE — 85027 COMPLETE CBC AUTOMATED: CPT | Performed by: HOSPITALIST

## 2017-11-26 PROCEDURE — C9113 INJ PANTOPRAZOLE SODIUM, VIA: HCPCS | Performed by: NURSE PRACTITIONER

## 2017-11-26 PROCEDURE — 84100 ASSAY OF PHOSPHORUS: CPT | Performed by: HOSPITALIST

## 2017-11-26 PROCEDURE — 80053 COMPREHEN METABOLIC PANEL: CPT | Performed by: HOSPITALIST

## 2017-11-26 PROCEDURE — 83735 ASSAY OF MAGNESIUM: CPT | Performed by: HOSPITALIST

## 2017-11-26 RX ORDER — ACETAMINOPHEN 325 MG/1
650 TABLET ORAL EVERY 6 HOURS PRN
Status: DISCONTINUED | OUTPATIENT
Start: 2017-11-26 | End: 2017-11-30 | Stop reason: HOSPADM

## 2017-11-26 RX ORDER — LORAZEPAM 2 MG/ML
0.5 INJECTION INTRAMUSCULAR ONCE
Status: COMPLETED | OUTPATIENT
Start: 2017-11-26 | End: 2017-11-26

## 2017-11-26 RX ORDER — KETOROLAC TROMETHAMINE 30 MG/ML
15 INJECTION, SOLUTION INTRAMUSCULAR; INTRAVENOUS EVERY 6 HOURS PRN
Status: DISCONTINUED | OUTPATIENT
Start: 2017-11-26 | End: 2017-11-26

## 2017-11-26 RX ORDER — DEXTROSE AND SODIUM CHLORIDE 5; .9 G/100ML; G/100ML
75 INJECTION, SOLUTION INTRAVENOUS CONTINUOUS
Status: DISCONTINUED | OUTPATIENT
Start: 2017-11-26 | End: 2017-11-30 | Stop reason: HOSPADM

## 2017-11-26 RX ORDER — LORAZEPAM 2 MG/ML
0.5 INJECTION INTRAMUSCULAR EVERY 6 HOURS PRN
Status: DISCONTINUED | OUTPATIENT
Start: 2017-11-26 | End: 2017-11-30 | Stop reason: HOSPADM

## 2017-11-26 RX ORDER — PANTOPRAZOLE SODIUM 40 MG/1
40 INJECTION, POWDER, FOR SOLUTION INTRAVENOUS EVERY 12 HOURS SCHEDULED
Status: DISCONTINUED | OUTPATIENT
Start: 2017-11-26 | End: 2017-11-30 | Stop reason: HOSPADM

## 2017-11-26 RX ORDER — ONDANSETRON 2 MG/ML
4 INJECTION INTRAMUSCULAR; INTRAVENOUS EVERY 6 HOURS PRN
Status: DISCONTINUED | OUTPATIENT
Start: 2017-11-26 | End: 2017-11-30 | Stop reason: HOSPADM

## 2017-11-26 RX ORDER — METOCLOPRAMIDE HYDROCHLORIDE 5 MG/ML
10 INJECTION INTRAMUSCULAR; INTRAVENOUS EVERY 6 HOURS PRN
Status: DISCONTINUED | OUTPATIENT
Start: 2017-11-26 | End: 2017-11-29

## 2017-11-26 RX ADMIN — VITAMIN D, TAB 1000IU (100/BT) 1000 UNITS: 25 TAB at 08:59

## 2017-11-26 RX ADMIN — FOLIC ACID 1 MG: 1 TABLET ORAL at 08:59

## 2017-11-26 RX ADMIN — LORAZEPAM 0.5 MG: 2 INJECTION INTRAMUSCULAR; INTRAVENOUS at 10:55

## 2017-11-26 RX ADMIN — LORAZEPAM 0.5 MG: 2 INJECTION INTRAMUSCULAR; INTRAVENOUS at 15:47

## 2017-11-26 RX ADMIN — HYDROMORPHONE HYDROCHLORIDE 0.5 MG: 1 INJECTION, SOLUTION INTRAMUSCULAR; INTRAVENOUS; SUBCUTANEOUS at 10:56

## 2017-11-26 RX ADMIN — PREGABALIN 100 MG: 100 CAPSULE ORAL at 22:26

## 2017-11-26 RX ADMIN — HYDROMORPHONE HYDROCHLORIDE 0.5 MG: 1 INJECTION, SOLUTION INTRAMUSCULAR; INTRAVENOUS; SUBCUTANEOUS at 22:29

## 2017-11-26 RX ADMIN — HYDROMORPHONE HYDROCHLORIDE 0.5 MG: 1 INJECTION, SOLUTION INTRAMUSCULAR; INTRAVENOUS; SUBCUTANEOUS at 19:00

## 2017-11-26 RX ADMIN — HEPARIN SODIUM 5000 UNITS: 5000 INJECTION, SOLUTION INTRAVENOUS; SUBCUTANEOUS at 06:01

## 2017-11-26 RX ADMIN — ONDANSETRON 4 MG: 2 INJECTION INTRAMUSCULAR; INTRAVENOUS at 03:16

## 2017-11-26 RX ADMIN — METOCLOPRAMIDE 10 MG: 5 INJECTION, SOLUTION INTRAMUSCULAR; INTRAVENOUS at 22:40

## 2017-11-26 RX ADMIN — HYDROMORPHONE HYDROCHLORIDE 0.5 MG: 1 INJECTION, SOLUTION INTRAMUSCULAR; INTRAVENOUS; SUBCUTANEOUS at 03:15

## 2017-11-26 RX ADMIN — PANTOPRAZOLE SODIUM 40 MG: 40 INJECTION, POWDER, FOR SOLUTION INTRAVENOUS at 11:05

## 2017-11-26 RX ADMIN — DULOXETINE HYDROCHLORIDE 60 MG: 60 CAPSULE, DELAYED RELEASE ORAL at 09:19

## 2017-11-26 RX ADMIN — HEPARIN SODIUM 5000 UNITS: 5000 INJECTION, SOLUTION INTRAVENOUS; SUBCUTANEOUS at 22:26

## 2017-11-26 RX ADMIN — NORETHINDRONE ACETATE 5 MG: 5 TABLET ORAL at 22:26

## 2017-11-26 RX ADMIN — HYDROMORPHONE HYDROCHLORIDE 0.5 MG: 1 INJECTION, SOLUTION INTRAMUSCULAR; INTRAVENOUS; SUBCUTANEOUS at 13:56

## 2017-11-26 RX ADMIN — HYDROMORPHONE HYDROCHLORIDE 0.5 MG: 1 INJECTION, SOLUTION INTRAMUSCULAR; INTRAVENOUS; SUBCUTANEOUS at 07:46

## 2017-11-26 RX ADMIN — PANTOPRAZOLE SODIUM 40 MG: 40 INJECTION, POWDER, FOR SOLUTION INTRAVENOUS at 22:26

## 2017-11-26 RX ADMIN — PREGABALIN 100 MG: 100 CAPSULE ORAL at 08:59

## 2017-11-26 RX ADMIN — HEPARIN SODIUM 5000 UNITS: 5000 INJECTION, SOLUTION INTRAVENOUS; SUBCUTANEOUS at 13:51

## 2017-11-26 RX ADMIN — LORAZEPAM 0.5 MG: 2 INJECTION INTRAMUSCULAR; INTRAVENOUS at 22:42

## 2017-11-26 RX ADMIN — ONDANSETRON 4 MG: 2 INJECTION INTRAMUSCULAR; INTRAVENOUS at 19:00

## 2017-11-26 RX ADMIN — DEXTROSE AND SODIUM CHLORIDE 75 ML/HR: 5; 900 INJECTION, SOLUTION INTRAVENOUS at 11:05

## 2017-11-26 NOTE — PROGRESS NOTES
Pt reports feeling "cold sweats"  Temp 98 8  Pt does not feel that she can tolerate swallowing pills right now  Has not had anything to eat or drink overnight    Will hold off on po meds this am

## 2017-11-26 NOTE — CONSULTS
History:  Jeffrey Starks  is a 59-year-old woman with chronic abdominal pain  She was admitted because of exacerbation of epigastric pain and inability to eat  According to the patient she has long history of irritable bowel  In 2014 her abdominal pain became more severe  She underwent cholecystectomy because of abnormal DISIDA scan showing decreased gallbladder ejection fraction  Pathological examination of the resected gallbladder bladder showed no evidence of stones, there was mild chronic cholecystitis  In April, 2016 she had recurrent epigastric pain  Was evaluated at the [address] [place] [address]  She underwent ERCP for possible sphincter of Oddi dysfunction  Cannulation of the common bile duct was very difficult and a precut sphincterotomy was done  The patient had severe postprocedural pain  She was unable to eat for 2 weeks after the procedure and according to the patient she was in the hospital for about 2 weeks  She was doing quite well until recently  About few weeks ago her epigastric pain started to recur and now is similar to what she had in 2016  Denies diarrhea, vomiting  There is no significant weight loss  Past medical history:    Fibromyalgia  Psoriatic arthritis  Chronic pelvic pain  Endometriosis  Review of Systems   Constitutional:  No weight loss, no fatigue  Positive for chills    Respiratory: Negative  no cough, no shortness of breath  Cardiovascular: Negative  no chest pain, no palpitations  Gastrointestinal:  As per H and P  Genitourinary: Positive for dyspareunia  Psychiatric:  The patient admits to anxiety  Allergies:  No known drug allergies  Medications:  Medications were reviewed  Denies chronic opiates  Physical examination:  Weight 76 7 kg, /86, heart rate 86, temperature is 98 4°  Skin:  Warm, no lesions, no jaundice  Head and neck normal oral mucosa, no jugular venous distention, no goiter, neck is supple    Lungs:  Clear, no wheezing  Heart:  Regular no murmur  Abdomen:  Soft, diffusely tender on minimal palpation  There is no guarding and no rebound  No hepatoma splenomegaly  No ascites  No masses  Extremities:  No edema, no calf tenderness  Neurological:  Oriented x3, no focal abnormalities, speech is intact  CT scan of the abdomen:  Normal, except possible small liver cyst or hemangioma  CMP, CBC:  Normal, lipase 152    Impressions/recommendations:  1  Chronic abdominal pain most likely functional   For completeness upper endoscopy to be scheduled for tomorrow  In addition to abdominal pain the patient has chronic pelvic pain and chronic fibromyalgia

## 2017-11-26 NOTE — SUBJECTIVE & OBJECTIVE
VTE Pharmacologic Prophylaxis:   Pharmacologic: Heparin  Mechanical VTE Prophylaxis in Place: Yes    Patient Centered Rounds: I have performed bedside rounds with nursing staff today  Discussions with Specialists or Other Care Team Provider:  GI    Education and Discussions with Family / Patient:  Patient and significant other at bedside    Time Spent for Care: 45 minutes  More than 50% of total time spent on counseling and coordination of care as described above  Current Length of Stay: 0 day(s)    Current Patient Status: Observation   Certification Statement: Patient with acute pain and unable to tolerate oral intake will likely require greater than 2 midnight stay will continue to monitor closely    Discharge Plan:  Home when medically stable, unlikely to happen within the next 24 hours secondary to severity of pain and inability to tolerate oral intake  Requires further GI workup    Code Status: Level 1 - Full Code      Subjective:   Reports severe abdominal pain epigastric that can radiate across level of diaphragm associated with mild nausea without vomiting  No headache or dizziness  Occasionally the pain will make her short of breath but no respiratory distress  No cough palpitations extremity edema diarrhea or constipation  Objective:     Vitals:   Temp (24hrs), Av 4 °F (36 9 °C), Min:98 4 °F (36 9 °C), Max:98 4 °F (36 9 °C)    HR:  [83-96] 86  Resp:  [18-20] 18  BP: (127-139)/(65-86) 132/86  SpO2:  [97 %-100 %] 100 %  Body mass index is 27 28 kg/m²  Input and Output Summary (last 24 hours):     No intake or output data in the 24 hours ending 17 1405    Physical Exam:     Physical Exam   Constitutional: She is oriented to person, place, and time  She appears well-developed and well-nourished  No distress  HENT:   Head: Normocephalic  Neck: Neck supple  Cardiovascular: Normal rate, regular rhythm and intact distal pulses  No murmur heard    Pulmonary/Chest: Effort normal and breath sounds normal  No respiratory distress  Abdominal: Soft  She exhibits no distension and no mass  Bowel sounds are decreased  There is tenderness  There is guarding  There is no rebound  Musculoskeletal: Normal range of motion  She exhibits no edema  Neurological: She is alert and oriented to person, place, and time  No cranial nerve deficit  Skin: Skin is warm and dry  Psychiatric: Her behavior is normal    Labile mood   Vitals reviewed  Additional Data:     Labs:      Results from last 7 days  Lab Units 11/26/17  0448  11/25/17  1314   WBC Thousand/uL 7 51  --  6 98   HEMOGLOBIN g/dL 14 0  --  14 3   I STAT HEMOGLOBIN   --   < >  --    HEMATOCRIT % 41 9  --  42 7   PLATELETS Thousands/uL 275  --  286   NEUTROS PCT %  --   --  51   LYMPHS PCT %  --   --  41   MONOS PCT %  --   --  5   EOS PCT %  --   --  2   < > = values in this interval not displayed  Results from last 7 days  Lab Units 11/26/17  0448   SODIUM mmol/L 140   POTASSIUM mmol/L 4 2   CHLORIDE mmol/L 107   CO2 mmol/L 28   BUN mg/dL 12   CREATININE mg/dL 1 06   CALCIUM mg/dL 8 9   TOTAL PROTEIN g/dL 7 1   BILIRUBIN TOTAL mg/dL 0 85   ALK PHOS U/L 50   ALT U/L 27   AST U/L 16   GLUCOSE RANDOM mg/dL 77           * I Have Reviewed All Lab Data Listed Above  * Additional Pertinent Lab Tests Reviewed:  Jasper Sahni Admission Reviewed    Imaging:    Imaging Reports Reviewed Today Include:  CT abdomen  Imaging Personally Reviewed by Myself Includes:  None    Recent Cultures (last 7 days):           Last 24 Hours Medication List:     EMS replenish medication  Does not apply Once   cholecalciferol 1,000 Units Oral Daily   DULoxetine 60 mg Oral Daily   folic acid 1 mg Oral Daily   heparin (porcine) 5,000 Units Subcutaneous Q8H [address]   [START ON 12/2/2017] methotrexate 2 5 mg Oral Weekly   mometasone 1 application Topical Daily   norethindrone 5 mg Oral HS   pantoprazole 40 mg Intravenous Q12H [address]   polyethylene glycol 17 g Oral Daily   pregabalin 100 mg Oral BID        Today, Patient Was Seen By: RICARDO Salazar    ** Please Note: Dictation voice to text software may have been used in the creation of this document   **

## 2017-11-26 NOTE — ED ATTENDING ATTESTATION
Domi Marie DO, saw and evaluated the patient  I have discussed the patient with the resident/non-physician practitioner and agree with the resident's/non-physician practitioner's findings, Plan of Care, and MDM as documented in the resident's/non-physician practitioner's note, except where noted  All available labs and Radiology studies were reviewed  At this point I agree with the current assessment done in the Emergency Department  I have conducted an independent evaluation of this patient a history and physical is as follows:  Patient is a 49-year-old female with previous history of cholecystectomy and a biliary stent placement with a pancreatic duct stent placement that time with nasogastric tube placement for malnutrition  Had lengthy stays at both [facility] as well as [address] [place] [address]  Previous records reviewed  Patient had recent unremarkable CT scan at the end of October  Patient has tenderness previous to similar episodes when she states she required placement of a biliary stent  Will obtain abdominal labs, reviewed previous records, administer IV fluids, antiemetics, analgesia and reassess  Will admit to the hospital for intractable abdominal pain and GI evaluation      Critical Care Time  CritCare Time

## 2017-11-26 NOTE — ASSESSMENT & PLAN NOTE
· Follows with pelvic pain specialist at [facility] ([facility]) system

## 2017-11-26 NOTE — PROGRESS NOTES
Pt requesting to have ivf as she is unable to eat  SLIM aware- per SLIM not medically needed at this time  Encouraged pt to try ice chips    Will re-eval in am

## 2017-11-26 NOTE — PROGRESS NOTES
[address] Service    Progress Note - Jose Cruz 32 y o  female MRN: [id number]    Unit/Bed#: CW2 212-01 Encounter: [id number]    * Epigastric pain   Assessment & Plan    · Describes a severe pain associated with nausea and worsens with eating or drinking  No nausea or vomiting diarrhea or constipation reported  · Suspect pain may be functional, GI to check a EGD  · CT of abdomen without acute findings, CBC and CMP unremarkable, UA bland  · Has prior history of cholecystectomy and biliary/pancreatic stent placement  · Records from prior hospitalization at [facility] briefly reviewed copy to be scanned into chart  · Continue antiemetics  · Initiate IV fluids and change diet to clear liquids for now  · Pain control        Psoriatic arthritis ([facility] [address] )   Assessment & Plan    · Continue home medications  · Outpatient follow-up with Rheumatology        Fibromyalgia muscle pain   Assessment & Plan    · Continue home meds        Endometriosis   Assessment & Plan    · Follows with pelvic pain specialist at [facility]          Addendum:  Patient requesting 2nd opinion from another gastroenterologist associated with [address] [place] [address]  Will consult [facility] associates patient to be seen tomorrow  VTE Pharmacologic Prophylaxis:   Pharmacologic: Heparin  Mechanical VTE Prophylaxis in Place: Yes    Patient Centered Rounds: I have performed bedside rounds with nursing staff today  Discussions with Specialists or Other Care Team Provider:  GI    Education and Discussions with Family / Patient:  Patient and significant other at bedside    Time Spent for Care: 45 minutes  More than 50% of total time spent on counseling and coordination of care as described above      Current Length of Stay: 0 day(s)    Current Patient Status: Observation   Certification Statement: Patient with acute pain and unable to tolerate oral intake will likely require greater than 2 midnight stay will continue to monitor closely    Discharge Plan:  Home when medically stable, unlikely to happen within the next 24 hours secondary to severity of pain and inability to tolerate oral intake  Requires further GI workup    Code Status: Level 1 - Full Code      Subjective:   Reports severe abdominal pain epigastric that can radiate across level of diaphragm associated with mild nausea without vomiting  No headache or dizziness  Occasionally the pain will make her short of breath but no respiratory distress  No cough palpitations extremity edema diarrhea or constipation  Objective:     Vitals:   Temp (24hrs), Av 4 °F (36 9 °C), Min:98 4 °F (36 9 °C), Max:98 4 °F (36 9 °C)    HR:  [83-96] 86  Resp:  [18-20] 18  BP: (127-139)/(65-86) 132/86  SpO2:  [97 %-100 %] 100 %  Body mass index is 27 28 kg/m²  Input and Output Summary (last 24 hours):     No intake or output data in the 24 hours ending 17 1405    Physical Exam:     Physical Exam   Constitutional: She is oriented to person, place, and time  She appears well-developed and well-nourished  No distress  HENT:   Head: Normocephalic  Neck: Neck supple  Cardiovascular: Normal rate, regular rhythm and intact distal pulses  No murmur heard  Pulmonary/Chest: Effort normal and breath sounds normal  No respiratory distress  Abdominal: Soft  She exhibits no distension and no mass  Bowel sounds are decreased  There is tenderness  There is guarding  There is no rebound  Musculoskeletal: Normal range of motion  She exhibits no edema  Neurological: She is alert and oriented to person, place, and time  No cranial nerve deficit  Skin: Skin is warm and dry  Psychiatric: Her behavior is normal    Labile mood   Vitals reviewed        Additional Data:     Labs:      Results from last 7 days  Lab Units 17  0448  17  1314   WBC Thousand/uL 7 51  --  6 98   HEMOGLOBIN g/dL 14 0  --  14 3   I STAT HEMOGLOBIN   --   < >  --    HEMATOCRIT % 41 9  --  42 7   PLATELETS Thousands/uL 275  --  286   NEUTROS PCT %  --   --  51   LYMPHS PCT %  --   --  41   MONOS PCT %  --   --  5   EOS PCT %  --   --  2   < > = values in this interval not displayed  Results from last 7 days  Lab Units 11/26/17  0448   SODIUM mmol/L 140   POTASSIUM mmol/L 4 2   CHLORIDE mmol/L 107   CO2 mmol/L 28   BUN mg/dL 12   CREATININE mg/dL 1 06   CALCIUM mg/dL 8 9   TOTAL PROTEIN g/dL 7 1   BILIRUBIN TOTAL mg/dL 0 85   ALK PHOS U/L 50   ALT U/L 27   AST U/L 16   GLUCOSE RANDOM mg/dL 77           * I Have Reviewed All Lab Data Listed Above  * Additional Pertinent Lab Tests Reviewed: [facility] Reviewed    Imaging:    Imaging Reports Reviewed Today Include:  CT abdomen  Imaging Personally Reviewed by Myself Includes:  None    Recent Cultures (last 7 days):           Last 24 Hours Medication List:     EMS replenish medication  Does not apply Once   cholecalciferol 1,000 Units Oral Daily   DULoxetine 60 mg Oral Daily   folic acid 1 mg Oral Daily   heparin (porcine) 5,000 Units Subcutaneous Q8H [address]   [START ON 12/2/2017] methotrexate 2 5 mg Oral Weekly   mometasone 1 application Topical Daily   norethindrone 5 mg Oral HS   pantoprazole 40 mg Intravenous Q12H [address]   polyethylene glycol 17 g Oral Daily   pregabalin 100 mg Oral BID        Today, Patient Was Seen By: RICARDO Rubin    ** Please Note: Dictation voice to text software may have been used in the creation of this document   **

## 2017-11-26 NOTE — CASE MANAGEMENT
Initial Clinical Review    St  [facility] in the [facility] by Asher Benton for 2017  Network Utilization Review Department  Phone: [phone]; Fax [phone]  ATTENTION: The Network Utilization Review Department is now centralized for our 7 Facilities  Make a note that we have a new phone and fax numbers for our Department  Please call with any questions or concerns to [phone] and carefully follow the prompts so that you are directed to the right person  All voicemails are confidential  Fax any determinations, approvals, denials, and requests for initial or continue stay review clinical to [phone]  Due to HIGH CALL volume, it would be easier if you could please send faxed requests to expedite your requests and in part, help us provide discharge notifications faster        Admission: Date/Time/Statement: 11/25/17 @ 1640 -- OBS    Orders Placed This Encounter   Procedures    Place in Observation (expected length of stay for this patient is less than two midnights)     Standing Status:   Standing     Number of Occurrences:   1     Order Specific Question:   Admitting Physician     Answer:   Deni Weiss     Order Specific Question:   Level of Care     Answer:   Med Surg [16]    Place in Observation     Standing Status:   Standing     Number of Occurrences:   1     Order Specific Question:   Admitting Physician     Answer:   Rashaad Orozco [[id number]]     Order Specific Question:   Level of Care     Answer:   Med Surg [16]       ED: Date/Time/Mode of Arrival:   ED Arrival Information     Expected Arrival [address] of Arrival Escorted By Service Admission Type    11/25/2017 12:11 11/25/2017 12:11 Urgent Ambulance [facility] General Medicine Urgent    Arrival Complaint    abdominal pain          Chief Complaint:   Chief Complaint   Patient presents with    Abdominal Pain     Pt  comes to the ER for evaluation of upper epigastric abdominal pain that woke her up this morning; pt  reports nausea and pain with eating and drinking but denies acutally vomiting; pt  has extensive h/o of 2 years of being evaluated for abdominal pain with no exact cause; pt  has had pancreatic stents, galllbladder removal, feeding tube, etc, with no relief; pt  also recentely dx with fibromyalgia and arthritis        History of Illness: 32 y o  female who presents with epigastric abdominal pain, which started Thursday located on the left side of the chest wall with difficulty breathing especially around the diaphragm area  Pain is worsened with eating and drinking water with no alleviating symptoms  Multiple episodes in the past, 3 x 1 in 2014 where it was found to have cholecystitis diet is with gangrene 2nd she had stents placed in her gallbladder liver area in 2016 and now she presented to the ER today  She was seen by Gastroenterology and was found to have gastritis in the past   Patient has a past medical history of having fibromyalgia, endometriosis,  psoriatic arthritis for which she takes methotrexate weekly and was slowly tapered off prednisone due to stomach discomfort  Review of systems negative for fever however she admitted to having chills, along with pleuritic chest pain nausea but no vomiting  ED Vital Signs:   ED Triage Vitals [11/25/17 1213]   Temperature Pulse Respirations Blood Pressure SpO2   98 °F (36 7 °C) 102 20 133/84 98 %      Temp Source Heart Rate Source Patient Position - Orthostatic VS BP Location FiO2 (%)   Oral Monitor Lying Right arm --      Pain Score       Worst Possible Pain        Wt Readings from Last 1 Encounters:   11/25/17 76 7 kg (169 lb)     Abnormal Labs/Diagnostic Test Results: CBC, BMP -- WNL  CT a//p -- Mild fecal stasis  Small hepatic lesion, too small to further characterize, possibly a cyst or hemangioma      ED Treatment:   Medication Administration from 11/25/2017 1211 to 11/25/2017 1817       Date/Time Order Dose Route Action Action by Comments     11/25/2017 1256 ondansetron (ZOFRAN) injection 4 mg 4 mg Intravenous Given Orlean Gum, RN      11/25/2017 1256 HYDROmorphone (DILAUDID) 1 mg/mL injection 0 5 mg 0 5 mg Intravenous Given Orlean Gum, RN      11/25/2017 1426 HYDROmorphone (DILAUDID) 1 mg/mL injection 0 5 mg 0 5 mg Intravenous Given Orlean Gum, RN      11/25/2017 1432 iohexol (OMNIPAQUE) 350 MG/ML injection (SINGLE-DOSE) 100 mL 100 mL Intravenous Given April Cater      11/25/2017 1448 ondansetron (ZOFRAN) injection 4 mg 4 mg Intravenous Given Orlean Gum, RN      11/25/2017 1648 ketorolac (TORADOL) 30 mg/mL injection 15 mg 15 mg Intravenous Given Orlean Gum, RN           Past Medical/Surgical History:   Past Medical History:   Diagnosis Date    Abdominal discomfort     Anxiety     Endometritis     Fibromyalgia     Psoriatic arthritis ([facility] )        Admitting Diagnosis: Abdominal pain [R10 9]  Nausea & vomiting [R11 2]  History of cholecystectomy [Z90 49]  Intractable abdominal pain [R10 9]    Age/Sex: 32 y o  female    Assessment/Plan:   Hospital Problem List:      Principal Problem:    Epigastric pain  Active Problems:    Psoriatic arthritis ([facility] [address] )    Endometriosis    Fibromyalgia muscle pain    History of cholecystectomy      Plan for the Primary Problem(s):  · Epigastric pain CT scan suggestive of hepatic lesion possible early hepatic cyst or hemangioma  ? Vital signs monitoring:  Currently she remains afebrile, with tachycardia & elevated blood pressure when she is anxious  ? CBC monitoring H&H  ? Gastroenterology consult  ? Could be a sign of psychological disorder:  Due to extensive history of fibromyalgia current, chronic pain syndrome:  Endometriosis/pelvic pain syndrome  ? All labs are unremarkable for this admission  ? Possibility of acute gastritis due to recent history of steroid use  ? Will continue PPI  ? Pain control   ?  Continue observation for patient ? Fecal stasis found on CT- will      Plan for Additional Problems:   Fibromyalgia-continue med  Endometriosis-continue meds  Arthritis-continue meds     VTE Prophylaxis: Heparin  / sequential compression device   Code Status: full  POLST: POLST is not applicable to this patient     Anticipated Length of Stay:  Patient will be admitted on an Observation basis with an anticipated length of stay of  < 2 midnights     Justification for Hospital Stay: medical observation      Admission Orders:  M/S unit  Consult GI  Regular diet  Up & oob as tolerated  venodynes b/l le    Scheduled Meds:   EMS replenish medication  Does not apply Once   cholecalciferol 1,000 Units Oral Daily   DULoxetine 60 mg Oral Daily   folic acid 1 mg Oral Daily   heparin (porcine) 5,000 Units Subcutaneous Q8H [facility]   [START ON 12/2/2017] methotrexate 2 5 mg Oral Weekly   mometasone 1 application Topical Daily   norethindrone 5 mg Oral Daily   pantoprazole 40 mg Oral BID AC   polyethylene glycol 17 g Oral Daily   pregabalin 100 mg Oral BID     Continuous Infusions:    PRN Meds: acetaminophen    ALPRAZolam    cyclobenzaprine    diazepam    HYDROmorphone 0 5 mg IV 11/25 x2, 11/26 x2    Ondansetron 4 mg IV 11/25 x1, 11/26 x1

## 2017-11-26 NOTE — ASSESSMENT & PLAN NOTE
· Describes a severe pain associated with nausea and worsens with eating or drinking  No nausea or vomiting diarrhea or constipation reported    · Suspect pain may be functional, GI to check a EGD  · CT of abdomen without acute findings, CBC and CMP unremarkable, UA bland  · Has prior history of cholecystectomy and biliary/pancreatic stent placement  · Records from prior hospitalization at [facility] briefly reviewed copy to be scanned into chart  · Continue antiemetics  · Initiate IV fluids and change diet to clear liquids for now  · Pain control

## 2017-11-27 ENCOUNTER — ANESTHESIA EVENT (INPATIENT)
Dept: GASTROENTEROLOGY | Facility: HOSPITAL | Age: 27
DRG: 254 | End: 2017-11-27
Payer: COMMERCIAL

## 2017-11-27 LAB
ANION GAP SERPL CALCULATED.3IONS-SCNC: 8 MMOL/L (ref 4–13)
BASOPHILS # BLD AUTO: 0.03 THOUSANDS/ΜL (ref 0–0.1)
BASOPHILS NFR BLD AUTO: 0 % (ref 0–1)
BUN SERPL-MCNC: 11 MG/DL (ref 5–25)
CALCIUM SERPL-MCNC: 8.3 MG/DL (ref 8.3–10.1)
CHLORIDE SERPL-SCNC: 122 MMOL/L (ref 100–108)
CO2 SERPL-SCNC: 26 MMOL/L (ref 21–32)
CREAT SERPL-MCNC: 0.98 MG/DL (ref 0.6–1.3)
EOSINOPHIL # BLD AUTO: 0.21 THOUSAND/ΜL (ref 0–0.61)
EOSINOPHIL NFR BLD AUTO: 3 % (ref 0–6)
ERYTHROCYTE [DISTWIDTH] IN BLOOD BY AUTOMATED COUNT: 13.6 % (ref 11.6–15.1)
GFR SERPL CREATININE-BSD FRML MDRD: 79 ML/MIN/1.73SQ M
GLUCOSE SERPL-MCNC: 95 MG/DL (ref 65–140)
HCT VFR BLD AUTO: 41.3 % (ref 34.8–46.1)
HGB BLD-MCNC: 13.6 G/DL (ref 11.5–15.4)
LYMPHOCYTES # BLD AUTO: 2.71 THOUSANDS/ΜL (ref 0.6–4.47)
LYMPHOCYTES NFR BLD AUTO: 39 % (ref 14–44)
MCH RBC QN AUTO: 28.6 PG (ref 26.8–34.3)
MCHC RBC AUTO-ENTMCNC: 32.9 G/DL (ref 31.4–37.4)
MCV RBC AUTO: 87 FL (ref 82–98)
MONOCYTES # BLD AUTO: 0.38 THOUSAND/ΜL (ref 0.17–1.22)
MONOCYTES NFR BLD AUTO: 6 % (ref 4–12)
NEUTROPHILS # BLD AUTO: 3.57 THOUSANDS/ΜL (ref 1.85–7.62)
NEUTS SEG NFR BLD AUTO: 52 % (ref 43–75)
NRBC BLD AUTO-RTO: 0 /100 WBCS
PLATELET # BLD AUTO: 267 THOUSANDS/UL (ref 149–390)
PMV BLD AUTO: 10.1 FL (ref 8.9–12.7)
POTASSIUM SERPL-SCNC: 3.8 MMOL/L (ref 3.5–5.3)
RBC # BLD AUTO: 4.75 MILLION/UL (ref 3.81–5.12)
SODIUM SERPL-SCNC: 138 MMOL/L (ref 136–145)
SODIUM SERPL-SCNC: 156 MMOL/L (ref 136–145)
WBC # BLD AUTO: 6.92 THOUSAND/UL (ref 4.31–10.16)

## 2017-11-27 PROCEDURE — 84295 ASSAY OF SERUM SODIUM: CPT | Performed by: PHYSICIAN ASSISTANT

## 2017-11-27 PROCEDURE — 80048 BASIC METABOLIC PNL TOTAL CA: CPT | Performed by: NURSE PRACTITIONER

## 2017-11-27 PROCEDURE — 85025 COMPLETE CBC W/AUTO DIFF WBC: CPT | Performed by: NURSE PRACTITIONER

## 2017-11-27 PROCEDURE — C9113 INJ PANTOPRAZOLE SODIUM, VIA: HCPCS | Performed by: NURSE PRACTITIONER

## 2017-11-27 RX ORDER — POLYETHYLENE GLYCOL 3350 17 G/17G
17 POWDER, FOR SOLUTION ORAL 2 TIMES DAILY
Status: DISCONTINUED | OUTPATIENT
Start: 2017-11-27 | End: 2017-11-30 | Stop reason: HOSPADM

## 2017-11-27 RX ADMIN — PREGABALIN 100 MG: 100 CAPSULE ORAL at 08:42

## 2017-11-27 RX ADMIN — HEPARIN SODIUM 5000 UNITS: 5000 INJECTION, SOLUTION INTRAVENOUS; SUBCUTANEOUS at 05:43

## 2017-11-27 RX ADMIN — PREGABALIN 100 MG: 100 CAPSULE ORAL at 21:04

## 2017-11-27 RX ADMIN — LORAZEPAM 0.5 MG: 2 INJECTION INTRAMUSCULAR; INTRAVENOUS at 15:34

## 2017-11-27 RX ADMIN — VITAMIN D, TAB 1000IU (100/BT) 1000 UNITS: 25 TAB at 08:42

## 2017-11-27 RX ADMIN — ONDANSETRON 4 MG: 2 INJECTION INTRAMUSCULAR; INTRAVENOUS at 12:23

## 2017-11-27 RX ADMIN — HYDROMORPHONE HYDROCHLORIDE 0.5 MG: 1 INJECTION, SOLUTION INTRAMUSCULAR; INTRAVENOUS; SUBCUTANEOUS at 20:09

## 2017-11-27 RX ADMIN — HYDROMORPHONE HYDROCHLORIDE 0.5 MG: 1 INJECTION, SOLUTION INTRAMUSCULAR; INTRAVENOUS; SUBCUTANEOUS at 09:31

## 2017-11-27 RX ADMIN — PANTOPRAZOLE SODIUM 40 MG: 40 INJECTION, POWDER, FOR SOLUTION INTRAVENOUS at 08:42

## 2017-11-27 RX ADMIN — HYDROMORPHONE HYDROCHLORIDE 0.5 MG: 1 INJECTION, SOLUTION INTRAMUSCULAR; INTRAVENOUS; SUBCUTANEOUS at 06:07

## 2017-11-27 RX ADMIN — HYDROMORPHONE HYDROCHLORIDE 0.5 MG: 1 INJECTION, SOLUTION INTRAMUSCULAR; INTRAVENOUS; SUBCUTANEOUS at 01:50

## 2017-11-27 RX ADMIN — HEPARIN SODIUM 5000 UNITS: 5000 INJECTION, SOLUTION INTRAVENOUS; SUBCUTANEOUS at 14:44

## 2017-11-27 RX ADMIN — FOLIC ACID 1 MG: 1 TABLET ORAL at 08:42

## 2017-11-27 RX ADMIN — HYDROMORPHONE HYDROCHLORIDE 0.5 MG: 1 INJECTION, SOLUTION INTRAMUSCULAR; INTRAVENOUS; SUBCUTANEOUS at 23:41

## 2017-11-27 RX ADMIN — HEPARIN SODIUM 5000 UNITS: 5000 INJECTION, SOLUTION INTRAVENOUS; SUBCUTANEOUS at 21:04

## 2017-11-27 RX ADMIN — DULOXETINE HYDROCHLORIDE 60 MG: 60 CAPSULE, DELAYED RELEASE ORAL at 08:42

## 2017-11-27 RX ADMIN — ONDANSETRON 4 MG: 2 INJECTION INTRAMUSCULAR; INTRAVENOUS at 05:49

## 2017-11-27 RX ADMIN — PANTOPRAZOLE SODIUM 40 MG: 40 INJECTION, POWDER, FOR SOLUTION INTRAVENOUS at 21:04

## 2017-11-27 RX ADMIN — LORAZEPAM 0.5 MG: 2 INJECTION INTRAMUSCULAR; INTRAVENOUS at 07:04

## 2017-11-27 RX ADMIN — DEXTROSE AND SODIUM CHLORIDE 75 ML/HR: 5; 900 INJECTION, SOLUTION INTRAVENOUS at 14:54

## 2017-11-27 RX ADMIN — HYDROMORPHONE HYDROCHLORIDE 0.5 MG: 1 INJECTION, SOLUTION INTRAMUSCULAR; INTRAVENOUS; SUBCUTANEOUS at 12:26

## 2017-11-27 RX ADMIN — NORETHINDRONE ACETATE 5 MG: 5 TABLET ORAL at 22:58

## 2017-11-27 RX ADMIN — DEXTROSE AND SODIUM CHLORIDE 75 ML/HR: 5; 900 INJECTION, SOLUTION INTRAVENOUS at 00:46

## 2017-11-27 RX ADMIN — METOCLOPRAMIDE 10 MG: 5 INJECTION, SOLUTION INTRAMUSCULAR; INTRAVENOUS at 20:09

## 2017-11-27 NOTE — SOCIAL WORK
CM met with patient,explained CM role with introduction  Patient lives with shane in [facility] with 3STE from front and 4 GISELLE from b ack  There is a bathroom on the first floor  Patient independent with ADL's ,does drive, has had to quit working recently  Patient uses a walking stick as needed at home with mobility due to psoriatic arthritis and fibromyalgia  Patient denies [facility] or inpatient rehab experience  Patient has a prescription plan and uses [facility] in [place]  PCP is Dr Kingston  Patient reports Mazin Pandey is her POA, CM requested paperwork to be brought in to be copied and scanned into computer chart  Patient reports history of Anxiety, meds ordered by PCP, Klonopin ordered by pelvic pain specialist, but patient has not started taking the Klonopin, Cymbalta ordered by Rheumatologist at [facility]  Primary  is patient's Mazin Pandey  [phone]  Patient reports not being comfortable with Dr Tonya Merida from GI, she did not want him to b e her GI treating physician, therefore the EGD for today was cancelled  Patient met with another GI specialist today, and is more comfortable with this GI physician

## 2017-11-27 NOTE — SOCIAL WORK
CM received a  phone call from Case management office, reporting patient's fiance is requesting a patient's bill of rights paperwork  Fiance sent to CW2 to discuss with CM  CM met with Chauncey Diaz, patient's fiance, who reports his concern regarding the discussion of discharging the patient after the EGD tomorrow  aMgan requesting to have patient transferred to another facility, if a cause for patient's inability to eat or drink is not found  Chauncey Diaz inquired about observation status , CM gave explanation of observation and inpatient, and criteria needed for each status  CM spoke with AUSTEN Way, who reported the patient is being made an inpatient, and the EGD will be done tomorrow  CM approached again by Ky Route, who reported patient is being made inpatient  CM made Magan aware the EGD is being done tomorrow  Patient reports her parents live nearby, is not speaking with her father at this time  CM will continue to follow

## 2017-11-27 NOTE — PLAN OF CARE
Problem: DISCHARGE PLANNING - CARE MANAGEMENT  Goal: Discharge to post-acute care or home with appropriate resources  INTERVENTIONS:  - Conduct assessment to determine patient/family and health care team treatment goals, and need for post-acute services based on payer coverage, community resources, and patient preferences, and barriers to discharge  - Address psychosocial, clinical, and financial barriers to discharge as identified in assessment in conjunction with the patient/family and health care team  - Arrange appropriate level of post-acute services according to patient's   needs and preference and payer coverage in collaboration with the physician and health care team  - Communicate with and update the patient/family, physician, and health care team regarding progress on the discharge plan  - Arrange appropriate transportation to post-acute venues  -Assist patient with making referrals for any DME,HHC , follow up care    Outcome: Progressing

## 2017-11-27 NOTE — CASE MANAGEMENT
Continued Stay Review    Date:   11/27/17 Monday CONTINUED OBSERVATION    11/25/17 1640  Place in Observation Once     Transfer Service: General Medicine       Question Answer Comment   Admitting Physician Bushra Sidhu    Level of Care Med Surg        11/25       Vital Signs: /80   Pulse 91   Temp 98 5 °F (36 9 °C) (Oral)   Resp 20   Ht 5' 6" (1 676 m)   Wt 76 7 kg (169 lb)   SpO2 100%   BMI 27 28 kg/m²     NPO  (scheduled EGD Today)    Continuous IV Infusions:   dextrose 5 % and sodium chloride 0 9 % 75 mL/hr Last Rate: 75 mL/hr (11/27/17 0046)       Medications:   Scheduled Meds:   EMS replenish medication  Does not apply Once   cholecalciferol 1,000 Units Oral Daily   DULoxetine 60 mg Oral Daily   folic acid 1 mg Oral Daily   heparin (porcine) 5,000 Units Subcutaneous Q8H [address]   [START ON 12/2/2017] methotrexate 2 5 mg Oral Weekly   mometasone 1 application Topical Daily   norethindrone 5 mg Oral HS   pantoprazole 40 mg Intravenous Q12H [address]   polyethylene glycol 17 g Oral Daily   pregabalin 100 mg Oral BID     PRN Meds:      acetaminophen    cyclobenzaprine    diazepam    IV HYDROmorphone 0 5 mg q3hrs prn given x 6/ 24 hrs      IV LORazepam 0 5 mg q6hrs prn given x 3/ 24 hrs    metoclopramide    IV Ondansetron 4 mg q6hrs prn given x 2/ 24 hrs    LABS/Diagnostic Results:   CBC  Results from last 7 days  Lab Units 11/26/17  0448   11/25/17  1314   WBC Thousand/uL 7 51  --  6 98   HEMOGLOBIN g/dL 14 0  --  14 3   I STAT HEMOGLOBIN    --   < >  --    HEMATOCRIT % 41 9  --  42 7   PLATELETS Thousands/uL 275  --  286   NEUTROS PCT %  --   --  51   LYMPHS PCT %  --   --  41   MONOS PCT %  --   --  5   EOS PCT %  --   --  2   < > = values in this interval not displayed      CMP  Results from last 7 days  Lab Units 11/26/17  0448   SODIUM mmol/L 140   POTASSIUM mmol/L 4 2   CHLORIDE mmol/L 107   CO2 mmol/L 28   BUN mg/dL 12   CREATININE mg/dL 1 06   CALCIUM mg/dL 8 9   TOTAL PROTEIN g/dL 7 1 BILIRUBIN TOTAL mg/dL 0 85   ALK PHOS U/L 50   ALT U/L 27   AST U/L 16   GLUCOSE RANDOM mg/dL 77           Age/Sex: 32 y o  female     Assessment/Plan:   Epigastric pain   Assessment & Plan     · Describes a severe pain associated with nausea and worsens with eating or drinking  No nausea or vomiting diarrhea or constipation reported  · Suspect pain may be functional, GI to check a EGD  · CT of abdomen without acute findings, CBC and CMP unremarkable, UA bland  · Has prior history of cholecystectomy and biliary/pancreatic stent placement  · Records from prior hospitalization at [facility], [facility], and [facility] briefly reviewed copy to be scanned into chart  · Continue antiemetics  · Initiate IV fluids and change diet to clear liquids for now  · Pain control       Psoriatic arthritis ([address] )   Assessment & Plan     · Continue home medications  · Outpatient follow-up with Rheumatology       Fibromyalgia muscle pain   Assessment & Plan     · Continue home meds       Endometriosis   Assessment & Plan     · Follows with pelvic pain specialist at [facility] ([facility]          Addendum:  Patient requesting 2nd opinion from another gastroenterologist associated with [address] [place] [address]  Will consult [facility] patient to be seen tomorrow      VTE Pharmacologic Prophylaxis:   Pharmacologic: Heparin  Mechanical VTE Prophylaxis in Place: Yes      Current Patient Status: Observation   Certification Statement: Patient with acute pain and unable to tolerate oral intake will likely require greater than 2 midnight stay will continue to monitor closely     Discharge Plan:  Home when medically stable, unlikely to happen within the next 24 hours secondary to severity of pain and inability to tolerate oral intake  Requires further GI workup        Gastroenterology  Consults Date of Service: 11/26/2017  1:58 PM     Impressions/recommendations:  1    Chronic abdominal pain most likely functional   For completeness upper endoscopy to be scheduled for tomorrow  In addition to abdominal pain the patient has chronic pelvic pain and chronic fibromyalgia          Discharge Plan:   [address] MEDICALLY CLEARED    CASE MANAGEMENT FOLLOWING CLOSELY FOR ALL DISCHARGE NEEDS

## 2017-11-27 NOTE — PROGRESS NOTES
Progress Note - Susana Breaux 32 y o  female MRN: [id number]    Unit/Bed#: 2 212-01 Encounter: [id number]        * Epigastric pain   Assessment & Plan    · Still has ongoing severe abdominal pain  · Initially refused EGD this morning but after speaking with new GI team is now agreeable for EGD  This will likely be planned for tomorrow morning  · CT of abdomen without acute findings, CBC and CMP unremarkable, UA bland  · Has prior history of cholecystectomy and biliary/pancreatic stent placement  · Continue antiemetics  · Still unable to tolerate po and is requiring IV fluids, antiemetics and IV pain medications  · We discussed that we may not be able to find source of her pain  · I will continue dilaudid at current dose overnight but we discussed that if EGD is negative, this will be weaned and discontinued  · Will also need to work on advancing diet after EGD as she states she has been unable to tolerate anything po since Saturday  · Suspect there to also be a psych component playing a role        Fibromyalgia muscle pain   Assessment & Plan    · Continue home meds        Endometriosis   Assessment & Plan    · Follows with pelvic pain specialist at [address]        Psoriatic arthritis [facility])   Assessment & Plan    · Continue home medications  · Outpatient follow-up with Rheumatology          VTE Pharmacologic Prophylaxis:   Pharmacologic: Heparin  Mechanical VTE Prophylaxis in Place: Yes    Patient Centered Rounds: I have performed bedside rounds with nursing staff today  Discussions with Specialists or Other Care Team Provider: Case management, physician advisor, GI    Education and Discussions with Family / Patient: Patient    Time Spent for Care: 45 minutes  More than 50% of total time spent on counseling and coordination of care as described above      Current Length of Stay: 0 day(s)    Current Patient Status: Inpatient   Certification Statement: The patient, admitted on an observation basis, will now require > 2 midnight hospital stay due to ongoing need for IVFs given inability to tolerate po, IV pain medication, further workup of pain  Discharge Plan: Pending EGD findings  If negative, will have to attempt to advance diet and wean IV pain medications prior to discharge  Code Status: Level 1 - Full Code      Subjective:   Patient seen and examined  Very anxious, often tearful  She is frustrated because she is still having severe abdominal pain and has been unable to eat anything since Saturday  She is overall very frustrated with her care  Initially she dismissed me and refused further interview and examination but then was agreeable to examination  Objective:     Vitals:   Temp (24hrs), Av 3 °F (36 8 °C), Min:97 8 °F (36 6 °C), Max:98 5 °F (36 9 °C)    HR:  [74-91] 90  Resp:  [16-20] 16  BP: (102-129)/(69-82) 115/71  SpO2:  [96 %-100 %] 99 %  Body mass index is 27 28 kg/m²  Input and Output Summary (last 24 hours): Intake/Output Summary (Last 24 hours) at 17 1601  Last data filed at 17 1044   Gross per 24 hour   Intake             1000 ml   Output              800 ml   Net              200 ml       Physical Exam:     Physical Exam   Constitutional: She is oriented to person, place, and time  Non-toxic appearing   HENT:   Head: Normocephalic and atraumatic  Eyes: No scleral icterus  Neck: Normal range of motion  Neck supple  Cardiovascular: Normal rate, regular rhythm and normal heart sounds  Pulmonary/Chest: Effort normal and breath sounds normal  No respiratory distress  She has no wheezes  She has no rales  Abdominal: Soft  Bowel sounds are normal    Flinches from even the slightest touch and does not allow adequate examination  Musculoskeletal: She exhibits no edema  Neurological: She is alert and oriented to person, place, and time  Skin: Skin is warm and dry  She is not diaphoretic     Psychiatric:   Anxious, tearful, crying Additional Data:     Labs:      Results from last 7 days  Lab Units 11/27/17  0452   WBC Thousand/uL 6 92   HEMOGLOBIN g/dL 13 6   HEMATOCRIT % 41 3   PLATELETS Thousands/uL 267   NEUTROS PCT % 52   LYMPHS PCT % 39   MONOS PCT % 6   EOS PCT % 3       Results from last 7 days  Lab Units 11/27/17  1134 11/27/17  0452 11/26/17  0448   SODIUM mmol/L 138 156* 140   POTASSIUM mmol/L  --  3 8 4 2   CHLORIDE mmol/L  --  122* 107   CO2 mmol/L  --  26 28   BUN mg/dL  --  11 12   CREATININE mg/dL  --  0 98 1 06   CALCIUM mg/dL  --  8 3 8 9   TOTAL PROTEIN g/dL  --   --  7 1   BILIRUBIN TOTAL mg/dL  --   --  0 85   ALK PHOS U/L  --   --  50   ALT U/L  --   --  27   AST U/L  --   --  16   GLUCOSE RANDOM mg/dL  --  95 77           * I Have Reviewed All Lab Data Listed Above  * Additional Pertinent Lab Tests Reviewed: All Labs Within Last 24 Hours Reviewed    Imaging:    Imaging Reports Reviewed Today Include: CT abd/pelvis  Imaging Personally Reviewed by Myself Includes:  None    Recent Cultures (last 7 days):           Last 24 Hours Medication List:     EMS replenish medication  Does not apply Once   cholecalciferol 1,000 Units Oral Daily   DULoxetine 60 mg Oral Daily   folic acid 1 mg Oral Daily   heparin (porcine) 5,000 Units Subcutaneous Q8H [address]   [START ON 12/2/2017] methotrexate 2 5 mg Oral Weekly   mometasone 1 application Topical Daily   norethindrone 5 mg Oral HS   pantoprazole 40 mg Intravenous Q12H [address]   polyethylene glycol 17 g Oral Daily   pregabalin 100 mg Oral BID        Today, Patient Was Seen By: Kassandra Jose PA-C    ** Please Note: Dragon 360 Dictation voice to text software may have been used in the creation of this document   **

## 2017-11-27 NOTE — PROGRESS NOTES
Spoke to Kavya Leija with GI team to update her on patient's request for new evaluation for second opinion due to patient "not happy" with first evaluation outcome  Andrew Acosta stated she will be seen by the GI team sometime today  Will continue to monitor

## 2017-11-27 NOTE — PHYSICIAN ADVISOR
The patient is a 49-year-old female who presented to the hospital on November 25, 2017  She has been hospitalized under observation status for two days  I discussed the case with the medical provider  She presented with abdominal pain and the inability to eat or drink  She has continued nausea  She has psoriatic arthritis and has been on methotrexate and prednisone  CT of the abdomen did not show acute findings  She has a history of cholecystectomy with biliary/pancreatic stent  On this first day she received three doses of intravenous Zofran, four doses of intravenous Dilaudid, one dose of intravenous Phenergan, and one dose of intravenous Toradol  On November 26, 2017 she reported severe abdominal pain as well as nausea  She required two doses of intravenous Zofran, one dose of intravenous Reglan, three doses of intravenous Ativan, five doses of Dilaudid, and one dose of Phenergan  Today, November 27, 2017 she continues to have nausea and abdominal pain  She feels unable to eat  She has received two doses of intravenous Zofran, one dose of intravenous Ativan, and four doses of intravenous Dilaudid as of 15:30  The patient also remains on intravenous fluids at 75 cc/hour  She has been NPO since yesterday except for sips with medications  The patient is now agreeable to undergo upper endoscopy and this will likely occur on November 28th  She initially declined this study  It is unlikely that she will stabilize for discharge tomorrow  At this point, she did not adequately respond to treatment in the hospital observation period and I recommend change in status to inpatient level of care      Velma Zee DO  Physician Advisor

## 2017-11-27 NOTE — ASSESSMENT & PLAN NOTE
· Still has ongoing severe abdominal pain  · Initially refused EGD this morning but after speaking with new GI team is now agreeable for EGD  This will likely be planned for tomorrow morning  · CT of abdomen without acute findings, CBC and CMP unremarkable, UA bland  · Has prior history of cholecystectomy and biliary/pancreatic stent placement  · Continue antiemetics  · Still unable to tolerate po and is requiring IV fluids, antiemetics and IV pain medications  · We discussed that we may not be able to find source of her pain  · I will continue dilaudid at current dose overnight but we discussed that if EGD is negative, this will be weaned and discontinued  · Will also need to work on advancing diet after EGD as she states she has been unable to tolerate anything po since Saturday    · Suspect there to also be a psych component playing a role

## 2017-11-27 NOTE — CONSULTS
Consultation - Wendy Woo 32 y o  female MRN: [id number]  Unit/Bed#: CW2 212-01 Encounter: [id number]      Assessment/Plan     Assessment/Plan:    1  Epigastric abdominal pain  EGD  4/2016 distal body and antrum gastritis  No ulcers and biopsy for a H pylori was negative  Colonoscopy 4/2016 was normal   MRCP 3/2017 hyperdensities in the liver most likely presenting cysts or hemangiomas  Common bile duct was 4 mm in diameter  Status post cholecystectomy  No pancreatic ductal dilatation  CT abdomen and pelvis showed mild fecal stasis, small hepatic lesion  Patient symptoms likely related to esophagitis likely secondary to medication use vs worsening gastritis and will benefit from endoscopy to further evaluate  Gastric Emptying study 5/2016-  Shows significant gastroparesis  However patient may have the study on narcotics  Would repeat off narcotics  Will hold off on intravenous Dilaudid due to worsening gastroparesis  History of Present Illness   Physician Requesting Consult: Mary Brenner MD  Reason for Consult / Principal Problem:  Abdominal pain  Hx and PE limited by:   HPI: Krysta Solis is a 32y o  year old female with past medical history of GERD, G6PD deficiency, fibromyalgia, endometrioisis, psoriatic arthritis, anxiety, chronic cholecystitis status post cholecystectomy (2014) presents for evaluation of abdominal pain  Patient says abdominal pain began approximately 2 weeks ago where she was seen at [facility] and discharged on Protonix  However pain did not subside and she presented to the emergency room for further evaluation  Patient states abdominal pain is epigastricand intermittent in nature and describes it as sharp 10 out 10 pain worsened by eating and drinking  Patient has been unable to drink or eat since admission  Pain is associated with nausea, however only had 1 episode of vomiting and that was approximately 2 weeks ago  Patient has had regular bowel movements  Patient does state that the pain started after she was diagnosed with psoriatic arthritis and was first started on methotrexate and prednisone  Patient denies any odynophagia or dysphagia  She does admit to some chills however no fevers  She has had unintentional weight loss however has not weight herself  Inpatient consult to gastroenterology  Performed by: Carlos A Rivers  Authorized by: Atif Arreguin           Review of Systems   Constitutional: Positive for appetite change, chills, diaphoresis, fatigue and unexpected weight change  Gastrointestinal: Positive for abdominal pain and nausea  Negative for abdominal distention, anal bleeding, blood in stool, constipation, diarrhea and vomiting  Skin: Negative for color change and pallor  Psychiatric/Behavioral: The patient is nervous/anxious  Historical Information   Past Medical History:   Diagnosis Date    Abdominal discomfort     Anxiety     Endometritis     Fibromyalgia     Psoriatic arthritis ([address] )      Past Surgical History:   Procedure Laterality Date    ABDOMINAL SURGERY       Social History   History   Alcohol Use No     History   Drug Use No     History   Smoking Status    Never Smoker   Smokeless Tobacco    Never Used     Family History: History reviewed  No pertinent family history      Meds/Allergies   all current active meds have been reviewed, current meds:   Current Facility-Administered Medications   Medication Dose Route Frequency     EMS REPLENISHMENT MED   Does not apply Once    acetaminophen (TYLENOL) tablet 650 mg  650 mg Oral Q6H PRN    cholecalciferol (VITAMIN D3) tablet 1,000 Units  1,000 Units Oral Daily    cyclobenzaprine (FLEXERIL) tablet 10 mg  10 mg Oral TID PRN    dextrose 5 % and sodium chloride 0 9 % infusion  75 mL/hr Intravenous Continuous    diazepam (VALIUM) tablet 5 mg  5 mg Oral Q8H PRN    DULoxetine (CYMBALTA) delayed release capsule 60 mg  60 mg Oral Daily    folic acid (Anise Sprinkles) tablet 1 mg  1 mg Oral Daily    heparin (porcine) subcutaneous injection 5,000 Units  5,000 Units Subcutaneous Q8H [address]    HYDROmorphone (DILAUDID) 1 mg/mL injection 0 2 mg  0 2 mg Intravenous Q4H PRN    HYDROmorphone (DILAUDID) 1 mg/mL injection 0 5 mg  0 5 mg Intravenous Q3H PRN    LORazepam (ATIVAN) 2 mg/mL injection 0 5 mg  0 5 mg Intravenous Q6H PRN    [START ON 12/2/2017] methotrexate tablet 2 5 mg  2 5 mg Oral Weekly    metoclopramide (REGLAN) injection 10 mg  10 mg Intravenous Q6H PRN    mometasone (ELOCON) 0 1 % cream 1 application  1 application Topical Daily    norethindrone (AYGESTIN) tablet 5 mg  5 mg Oral HS    ondansetron (ZOFRAN) injection 4 mg  4 mg Intravenous Q6H PRN    pantoprazole (PROTONIX) injection 40 mg  40 mg Intravenous Q12H [address]    polyethylene glycol (MIRALAX) packet 17 g  17 g Oral Daily    pregabalin (LYRICA) capsule 100 mg  100 mg Oral BID    and PTA meds:   Prior to Admission Medications   Prescriptions Last Dose Informant Patient Reported? Taking?    ALPRAZolam (XANAX) 1 mg tablet 11/25/2017 at Unknown time  Yes Yes   Sig: Take 1 mg by mouth 3 (three) times a day as needed for anxiety   DULoxetine (CYMBALTA) 60 mg delayed release capsule 11/25/2017 at Unknown time  Yes Yes   Sig: Take 60 mg by mouth daily   cholecalciferol (VITAMIN D3) 1,000 units tablet 11/25/2017 at Unknown time  Yes Yes   Sig: Take 1,000 Units by mouth daily   clonazePAM (KlonoPIN) 0 5 mg tablet 11/25/2017 at Unknown time  Yes Yes   Sig: Take 0 5 mg by mouth 2 (two) times a day as needed for seizures   cyclobenzaprine (FLEXERIL) 5 mg tablet 11/25/2017 at Unknown time  Yes Yes   Sig: Take 10 mg by mouth 3 (three) times a day as needed for muscle spasms   diazepam (VALIUM) 5 mg tablet 11/25/2017 at Unknown time  Yes Yes   Sig: Take 5 mg by mouth every 8 (eight) hours as needed for anxiety   folic acid (FOLVITE) 1 mg tablet 11/25/2017 at Unknown time  Yes Yes   Sig: Take 1 mg by mouth daily   methotrexate 2 5 mg tablet 11/25/2017 at Unknown time  Yes Yes   Sig: Take 2 5 mg by mouth once a week   mometasone (ELOCON) 0 1 % cream 11/25/2017 at Unknown time  Yes Yes   Sig: Apply 1 application topically 2 (two) times a day   norethindrone (AYGESTIN) 5 mg tablet 11/25/2017 at Unknown time  Yes Yes   Sig: Take 5 mg by mouth daily   omeprazole (PriLOSEC) 40 MG capsule 11/25/2017 at Unknown time  Yes Yes   Sig: Take 40 mg by mouth daily   pregabalin (LYRICA) 100 mg capsule 11/25/2017 at Unknown time  Yes Yes   Sig: Take 100 mg by mouth 2 (two) times a day      Facility-Administered Medications: None       No Known Allergies    Objective       Intake/Output Summary (Last 24 hours) at 11/27/17 1437  Last data filed at 11/27/17 1044   Gross per 24 hour   Intake             1000 ml   Output              800 ml   Net              200 ml       Invasive Devices:   Peripheral IV 11/25/17 Left Hand (Active)   Site Assessment Clean;Dry; Intact 11/26/2017  3:30 PM   Dressing Type Transparent 11/26/2017  3:30 PM   Line Status Infusing 11/26/2017  3:30 PM   Dressing Status Clean;Dry; Intact 11/26/2017  3:30 PM   Dressing Change Due 11/29/17 11/26/2017 12:00 PM       Physical Exam   Constitutional: She appears well-developed and well-nourished  HENT:   Head: Normocephalic and atraumatic  Mouth/Throat: Oropharynx is clear and moist    Eyes: No scleral icterus  Cardiovascular: Normal rate and regular rhythm  Pulmonary/Chest: Effort normal and breath sounds normal    Abdominal: Soft  Bowel sounds are normal  She exhibits no distension and no mass  There is tenderness  There is no rebound and no guarding  Musculoskeletal: She exhibits no edema  Lab Results: I have personally reviewed pertinent reports  Imaging Studies: I have personally reviewed pertinent reports  EKG, Pathology, and Other Studies: I have personally reviewed pertinent reports        VTE Prophylaxis: Enoxaparin (Lovenox)

## 2017-11-28 ENCOUNTER — GENERIC CONVERSION - ENCOUNTER (OUTPATIENT)
Dept: OTHER | Facility: OTHER | Age: 27
End: 2017-11-28

## 2017-11-28 ENCOUNTER — ANESTHESIA (INPATIENT)
Dept: GASTROENTEROLOGY | Facility: HOSPITAL | Age: 27
DRG: 254 | End: 2017-11-28
Payer: COMMERCIAL

## 2017-11-28 LAB
ALBUMIN SERPL BCP-MCNC: 3.3 G/DL (ref 3.5–5)
ALP SERPL-CCNC: 65 U/L (ref 46–116)
ALT SERPL W P-5'-P-CCNC: 48 U/L (ref 12–78)
ANION GAP SERPL CALCULATED.3IONS-SCNC: 5 MMOL/L (ref 4–13)
AST SERPL W P-5'-P-CCNC: 23 U/L (ref 5–45)
BASOPHILS # BLD AUTO: 0.02 THOUSANDS/ΜL (ref 0–0.1)
BASOPHILS NFR BLD AUTO: 0 % (ref 0–1)
BILIRUB SERPL-MCNC: 1.18 MG/DL (ref 0.2–1)
BUN SERPL-MCNC: 11 MG/DL (ref 5–25)
CALCIUM SERPL-MCNC: 8.6 MG/DL (ref 8.3–10.1)
CHLORIDE SERPL-SCNC: 108 MMOL/L (ref 100–108)
CO2 SERPL-SCNC: 26 MMOL/L (ref 21–32)
CREAT SERPL-MCNC: 1.09 MG/DL (ref 0.6–1.3)
EOSINOPHIL # BLD AUTO: 0.23 THOUSAND/ΜL (ref 0–0.61)
EOSINOPHIL NFR BLD AUTO: 3 % (ref 0–6)
ERYTHROCYTE [DISTWIDTH] IN BLOOD BY AUTOMATED COUNT: 13.7 % (ref 11.6–15.1)
GFR SERPL CREATININE-BSD FRML MDRD: 70 ML/MIN/1.73SQ M
GLUCOSE SERPL-MCNC: 103 MG/DL (ref 65–140)
HCT VFR BLD AUTO: 39.7 % (ref 34.8–46.1)
HGB BLD-MCNC: 13.1 G/DL (ref 11.5–15.4)
LYMPHOCYTES # BLD AUTO: 2.54 THOUSANDS/ΜL (ref 0.6–4.47)
LYMPHOCYTES NFR BLD AUTO: 36 % (ref 14–44)
MCH RBC QN AUTO: 28.8 PG (ref 26.8–34.3)
MCHC RBC AUTO-ENTMCNC: 33 G/DL (ref 31.4–37.4)
MCV RBC AUTO: 87 FL (ref 82–98)
MONOCYTES # BLD AUTO: 0.4 THOUSAND/ΜL (ref 0.17–1.22)
MONOCYTES NFR BLD AUTO: 6 % (ref 4–12)
NEUTROPHILS # BLD AUTO: 3.93 THOUSANDS/ΜL (ref 1.85–7.62)
NEUTS SEG NFR BLD AUTO: 55 % (ref 43–75)
NRBC BLD AUTO-RTO: 0 /100 WBCS
PLATELET # BLD AUTO: 260 THOUSANDS/UL (ref 149–390)
PMV BLD AUTO: 9.7 FL (ref 8.9–12.7)
POTASSIUM SERPL-SCNC: 3.7 MMOL/L (ref 3.5–5.3)
PROT SERPL-MCNC: 6.7 G/DL (ref 6.4–8.2)
RBC # BLD AUTO: 4.55 MILLION/UL (ref 3.81–5.12)
SODIUM SERPL-SCNC: 139 MMOL/L (ref 136–145)
WBC # BLD AUTO: 7.15 THOUSAND/UL (ref 4.31–10.16)

## 2017-11-28 PROCEDURE — 85025 COMPLETE CBC W/AUTO DIFF WBC: CPT | Performed by: PHYSICIAN ASSISTANT

## 2017-11-28 PROCEDURE — 0DB68ZX EXCISION OF STOMACH, VIA NATURAL OR ARTIFICIAL OPENING ENDOSCOPIC, DIAGNOSTIC: ICD-10-PCS | Performed by: INTERNAL MEDICINE

## 2017-11-28 PROCEDURE — 0DB58ZX EXCISION OF ESOPHAGUS, VIA NATURAL OR ARTIFICIAL OPENING ENDOSCOPIC, DIAGNOSTIC: ICD-10-PCS | Performed by: INTERNAL MEDICINE

## 2017-11-28 PROCEDURE — 80053 COMPREHEN METABOLIC PANEL: CPT | Performed by: PHYSICIAN ASSISTANT

## 2017-11-28 PROCEDURE — C9113 INJ PANTOPRAZOLE SODIUM, VIA: HCPCS | Performed by: NURSE PRACTITIONER

## 2017-11-28 PROCEDURE — 0DB98ZX EXCISION OF DUODENUM, VIA NATURAL OR ARTIFICIAL OPENING ENDOSCOPIC, DIAGNOSTIC: ICD-10-PCS | Performed by: INTERNAL MEDICINE

## 2017-11-28 PROCEDURE — 88342 IMHCHEM/IMCYTCHM 1ST ANTB: CPT | Performed by: INTERNAL MEDICINE

## 2017-11-28 PROCEDURE — 88305 TISSUE EXAM BY PATHOLOGIST: CPT | Performed by: INTERNAL MEDICINE

## 2017-11-28 RX ORDER — BISACODYL 10 MG
10 SUPPOSITORY, RECTAL RECTAL DAILY PRN
Status: DISCONTINUED | OUTPATIENT
Start: 2017-11-28 | End: 2017-11-30 | Stop reason: HOSPADM

## 2017-11-28 RX ORDER — KETOROLAC TROMETHAMINE 30 MG/ML
INJECTION, SOLUTION INTRAMUSCULAR; INTRAVENOUS AS NEEDED
Status: DISCONTINUED | OUTPATIENT
Start: 2017-11-28 | End: 2017-11-28 | Stop reason: SURG

## 2017-11-28 RX ORDER — MIDAZOLAM HYDROCHLORIDE 1 MG/ML
2 INJECTION INTRAMUSCULAR; INTRAVENOUS ONCE
Status: COMPLETED | OUTPATIENT
Start: 2017-11-28 | End: 2017-11-28

## 2017-11-28 RX ORDER — SODIUM CHLORIDE 9 MG/ML
INJECTION, SOLUTION INTRAVENOUS CONTINUOUS PRN
Status: DISCONTINUED | OUTPATIENT
Start: 2017-11-28 | End: 2017-11-28 | Stop reason: SURG

## 2017-11-28 RX ORDER — PROPOFOL 10 MG/ML
INJECTION, EMULSION INTRAVENOUS AS NEEDED
Status: DISCONTINUED | OUTPATIENT
Start: 2017-11-28 | End: 2017-11-28 | Stop reason: SURG

## 2017-11-28 RX ADMIN — HYDROMORPHONE HYDROCHLORIDE 0.5 MG: 1 INJECTION, SOLUTION INTRAMUSCULAR; INTRAVENOUS; SUBCUTANEOUS at 18:40

## 2017-11-28 RX ADMIN — DEXTROSE AND SODIUM CHLORIDE 75 ML/HR: 5; 900 INJECTION, SOLUTION INTRAVENOUS at 16:42

## 2017-11-28 RX ADMIN — MIDAZOLAM 2 MG: 1 INJECTION INTRAMUSCULAR; INTRAVENOUS at 11:20

## 2017-11-28 RX ADMIN — PROPOFOL 40 MG: 10 INJECTION, EMULSION INTRAVENOUS at 10:55

## 2017-11-28 RX ADMIN — PREGABALIN 100 MG: 100 CAPSULE ORAL at 22:00

## 2017-11-28 RX ADMIN — PANTOPRAZOLE SODIUM 40 MG: 40 INJECTION, POWDER, FOR SOLUTION INTRAVENOUS at 13:09

## 2017-11-28 RX ADMIN — SODIUM CHLORIDE: 0.9 INJECTION, SOLUTION INTRAVENOUS at 10:44

## 2017-11-28 RX ADMIN — HYDROMORPHONE HYDROCHLORIDE 0.5 MG: 1 INJECTION, SOLUTION INTRAMUSCULAR; INTRAVENOUS; SUBCUTANEOUS at 05:11

## 2017-11-28 RX ADMIN — HYDROMORPHONE HYDROCHLORIDE 0.5 MG: 1 INJECTION, SOLUTION INTRAMUSCULAR; INTRAVENOUS; SUBCUTANEOUS at 14:49

## 2017-11-28 RX ADMIN — METOCLOPRAMIDE 10 MG: 5 INJECTION, SOLUTION INTRAMUSCULAR; INTRAVENOUS at 22:06

## 2017-11-28 RX ADMIN — HEPARIN SODIUM 5000 UNITS: 5000 INJECTION, SOLUTION INTRAVENOUS; SUBCUTANEOUS at 14:50

## 2017-11-28 RX ADMIN — LORAZEPAM 0.5 MG: 2 INJECTION INTRAMUSCULAR; INTRAVENOUS at 09:49

## 2017-11-28 RX ADMIN — NORETHINDRONE ACETATE 5 MG: 5 TABLET ORAL at 22:08

## 2017-11-28 RX ADMIN — ONDANSETRON 4 MG: 2 INJECTION INTRAMUSCULAR; INTRAVENOUS at 16:44

## 2017-11-28 RX ADMIN — HYDROMORPHONE HYDROCHLORIDE 0.5 MG: 1 INJECTION, SOLUTION INTRAMUSCULAR; INTRAVENOUS; SUBCUTANEOUS at 22:07

## 2017-11-28 RX ADMIN — POLYETHYLENE GLYCOL 3350 17 G: 17 POWDER, FOR SOLUTION ORAL at 20:43

## 2017-11-28 RX ADMIN — PANTOPRAZOLE SODIUM 40 MG: 40 INJECTION, POWDER, FOR SOLUTION INTRAVENOUS at 22:00

## 2017-11-28 RX ADMIN — FOLIC ACID 1 MG: 1 TABLET ORAL at 13:09

## 2017-11-28 RX ADMIN — PROPOFOL 40 MG: 10 INJECTION, EMULSION INTRAVENOUS at 10:52

## 2017-11-28 RX ADMIN — KETOROLAC TROMETHAMINE 30 MG: 30 INJECTION, SOLUTION INTRAMUSCULAR at 11:05

## 2017-11-28 RX ADMIN — PREGABALIN 100 MG: 100 CAPSULE ORAL at 13:09

## 2017-11-28 RX ADMIN — ONDANSETRON 4 MG: 2 INJECTION INTRAMUSCULAR; INTRAVENOUS at 05:49

## 2017-11-28 RX ADMIN — HEPARIN SODIUM 5000 UNITS: 5000 INJECTION, SOLUTION INTRAVENOUS; SUBCUTANEOUS at 05:11

## 2017-11-28 RX ADMIN — LORAZEPAM 0.5 MG: 2 INJECTION INTRAMUSCULAR; INTRAVENOUS at 00:37

## 2017-11-28 RX ADMIN — PROPOFOL 70 MG: 10 INJECTION, EMULSION INTRAVENOUS at 10:50

## 2017-11-28 RX ADMIN — DULOXETINE HYDROCHLORIDE 60 MG: 60 CAPSULE, DELAYED RELEASE ORAL at 14:49

## 2017-11-28 RX ADMIN — LORAZEPAM 0.5 MG: 2 INJECTION INTRAMUSCULAR; INTRAVENOUS at 22:05

## 2017-11-28 RX ADMIN — HEPARIN SODIUM 5000 UNITS: 5000 INJECTION, SOLUTION INTRAVENOUS; SUBCUTANEOUS at 22:06

## 2017-11-28 RX ADMIN — MOMETASONE FUROATE 1 APPLICATION: 1 CREAM TOPICAL at 14:49

## 2017-11-28 RX ADMIN — HYDROMORPHONE HYDROCHLORIDE 0.2 MG: 1 INJECTION, SOLUTION INTRAMUSCULAR; INTRAVENOUS; SUBCUTANEOUS at 09:28

## 2017-11-28 NOTE — ANESTHESIA PREPROCEDURE EVALUATION
Review of Systems/Medical History    Chart reviewed  No history of anesthetic complications     Cardiovascular  Negative cardio ROS    Pulmonary  Negative pulmonary ROS ,        GI/Hepatic    GERD ,   Comment: Epigastric pain, hx IBS          Endo/Other  Arthritis (psoriatic arthritis)  Comment: G6PD deficiency     GYN      Comment: (-) HCG  11/25/17  Endometriosis, chronic pelvic pain     Hematology   Musculoskeletal       Neurology   Psychology   Anxiety,   Comment: fibromyalgia         Physical Exam    Airway    Mallampati score: I  TM Distance: >3 FB  Neck ROM: full     Dental   No notable dental hx     Cardiovascular  Comment: Negative ROS,     Pulmonary      Other Findings        Anesthesia Plan  ASA Score- 2       Anesthesia Type- IV sedation with anesthesia with ASA Monitors  Additional Monitors:   Airway Plan:           Induction- intravenous  Informed Consent- Anesthetic plan and risks discussed with patient  I personally reviewed this patient with the CRNA  Discussed and agreed on the Anesthesia Plan with the CRNA  Lola Rodriguez

## 2017-11-28 NOTE — ANESTHESIA POSTPROCEDURE EVALUATION
Post-Op Assessment Note      CV Status:  Stable    Mental Status:  Alert and awake    Hydration Status:  Euvolemic    PONV Controlled:  Controlled    Airway Patency:  Patent    Post Op Vitals Reviewed: Yes          Staff: CRNA       Comments: Pt  to Pacu  Report given  Course uneventful  VSS throughout            BP (!) 184/76 (11/28/17 1109)    Temp      Pulse 90 (11/28/17 1109)   Resp 20 (11/28/17 1109)    SpO2 100 % (11/28/17 1109)

## 2017-11-28 NOTE — OP NOTE
**** GI/ENDOSCOPY REPORT ****     PATIENT NAME: KRZYSZTOF Liang - VISIT ID:  Patient ID: [id number] YOB: 1990     INTRODUCTION: Esophagogastroduodenoscopy - A 32 female patient presents   for an inpatient Esophagogastroduodenoscopy at [facility]  INDICATIONS: Chest pain  Dysphagia  Odynophagia  Pain located in the   epigastrium  CONSENT: The benefits, risks, and alternatives to the procedure were   discussed and informed consent was obtained from the patient  PREPARATION:  EKG, pulse, pulse oximetry and blood pressure were monitored   throughout the procedure  ASA Classification: Class 2 - Patient has mild   to moderate systemic disturbance that may or may not be related to the   disorder requiring surgery  MEDICATIONS: Anesthesia-check records     PROCEDURE:  The endoscope was passed without difficulty through the mouth   under direct visualization and advanced to the 2nd portion of the   duodenum  The scope was withdrawn and the mucosa was carefully examined  Retroflexion was performed  FINDINGS:   Esophagus: The esophagus appeared to be normal    Multiple   random biopsies was taken from the proximal third of the esophagus  GE   junction: There was a small sliding hiatus hernia visible in the GE   junction  Stomach: The stomach appeared to be normal       Multiple   random biopsies was taken  Duodenum: The duodenum appeared to be normal      Multiple random biopsies was taken  COMPLICATIONS: There were no complications  IMPRESSIONS: Normal esophagus  Multiple biopsies taken  A hiatus hernia   found  Normal stomach  Multiple biopsies taken  Normal duodenum  Multiple   biopsies taken  RECOMMENDATIONS: Continue current medications  Anti-reflux measures: Raise   the head of the bed 4 to 6 inches  Avoid smoking  Avoid excess coffee, tea   or other caffeinated beverages  Avoid garments that fit tightly through   the abdomen  Avoid eating before bed  Follow-up on the results of the   biopsy specimens  Resume diet  Stable for d/c from a GI standpoint  Suggest outpatient pH/impedance testing, esophageal manometry, and gastric   emptying study (off narcotics)  ESTIMATED BLOOD LOSS:     PATHOLOGY SPECIMENS: Multiple random biopsies taken from the proximal   third of the esophagus  Multiple random biopsies taken  Multiple random   biopsies taken  PROCEDURE CODES:     ICD-9 Codes: 786 50 Chest pain, unspecified 787 2 DYSPHAGIA 789 06   Abdominal pain, epigastric 553 3 Diaphragmatic hernia without mention of   obstruction or gangrene     ICD-10 Codes: R07 9 Chest pain, unspecified R13 10 Dysphagia, unspecified   R10 13 Epigastric pain K44 Diaphragmatic hernia     PERFORMED BY: Dr Candance Pancoast, M D  on 11/28/2017  Version 1, electronically signed by CORY Quiroz  on 11/28/2017   at 11:11

## 2017-11-28 NOTE — PROGRESS NOTES
[facility] Internal Medicine Progress Note  Patient: Jourdan Castro 32 y o  female   MRN: [id number]  PCP: Jon Capellan MD  Unit/Bed#: -[place] Encounter: [id number]  Date Of Visit: 11/28/17    Assessment:  Epigastric pain, likely functional abdominal pain  - EGD with small hiatal  - at this time patient is interested in surgical consultation which has been placed and is pending although we did discuss that surgical procedure or intervention might not be granted this time  - case discussed with acute pain management, if no surgery is planned, patient agreeable to discontinue IV Dilaudid and start oxycodone solution 5 mg t i d  scheduled for 3 days to be tapered afterwards to b i d  for 3 days and then to daily for 3 days until discontinued  - encourage p o , so far patient has been refusing solids and fluids, might consider barium esophagus for the complaining of dysphagia    Fibromyalgia  - continue with current care    Psoriatic arthritis  - continue with methotrexate and follow up with rheumatologist outpatient    Endometriosis  - follow-up with pelvic pain specialist at [address] [place] [address]      VTE Pharmacologic Prophylaxis:  yes  Pharmacologic: Heparin  Mechanical VTE Prophylaxis in Place: Yes     Patient Centered Rounds: I have performed bedside rounds with nursing staff today      Discussions with Specialists or Other Care Team Provider:  acute pain management     Education and Discussions with Family / Patient: Patient and her fiance at bedside     Time Spent for Care: 45 minutes    More than 50% of total time spent on counseling and coordination of care as described above      Current Length of Stay: 0 day(s)     Current Patient Status: Inpatient   Certification Statement: The patient, admitted on an observation basis, will now require > 2 midnight hospital stay due to intractable pain, still NPO     Discharge Plan:  home in 1-2 days if able to discontinue IV Dilaudid and patient can tolerate p o      Code Status: Level 1 - Full Code    Subjective:   Patient complains of ongoing epigastric and chest pain, she feels frustrated as EGD did not find a clear source of the pain    Objective:     Vitals:   Temp (24hrs), Av 9 °F (36 6 °C), Min:97 2 °F (36 2 °C), Max:98 2 °F (36 8 °C)    HR:  [67-90] 73  Resp:  [16-20] 20  BP: (105-184)/(67-88) 112/70  SpO2:  [97 %-100 %] 97 %  Body mass index is 27 28 kg/m²  Input and Output Summary (last 24 hours): Intake/Output Summary (Last 24 hours) at 17 1607  Last data filed at 17 1105   Gross per 24 hour   Intake          1643 75 ml   Output                0 ml   Net          1643 75 ml       Physical Exam:     Physical Exam   Constitutional: She is oriented to person, place, and time  She appears well-developed  Cardiovascular: Normal rate, regular rhythm and normal heart sounds  Exam reveals no friction rub  No murmur heard  Pulmonary/Chest: Effort normal and breath sounds normal  No respiratory distress  She has no wheezes  She has no rales  Abdominal: Soft  Bowel sounds are normal  She exhibits no distension  There is tenderness (Epigastric area)  There is no rebound and no guarding  Musculoskeletal: She exhibits no edema  Neurological: She is alert and oriented to person, place, and time  She exhibits normal muscle tone  Skin: Skin is warm  Psychiatric: She has a normal mood and affect         Additional Data:     Labs:      Results from last 7 days  Lab Units 17  0512   WBC Thousand/uL 7 15   HEMOGLOBIN g/dL 13 1   HEMATOCRIT % 39 7   PLATELETS Thousands/uL 260   NEUTROS PCT % 55   LYMPHS PCT % 36   MONOS PCT % 6   EOS PCT % 3       Results from last 7 days  Lab Units 17  0512   SODIUM mmol/L 139   POTASSIUM mmol/L 3 7   CHLORIDE mmol/L 108   CO2 mmol/L 26   BUN mg/dL 11   CREATININE mg/dL 1 09   CALCIUM mg/dL 8 6   TOTAL PROTEIN g/dL 6 7   BILIRUBIN TOTAL mg/dL 1 18*   ALK PHOS U/L 65   ALT U/L 48   AST U/L 23 GLUCOSE RANDOM mg/dL 103           * I Have Reviewed All Lab Data Listed Above  * Additional Pertinent Lab Tests Reviewed: All Labs Within Last 24 Hours Reviewed    Imaging:    Imaging Reports Reviewed Today Include:  None  Imaging Personally Reviewed by Myself Includes:  None    Recent Cultures (last 7 days):           Last 24 Hours Medication List:     cholecalciferol 1,000 Units Oral Daily   DULoxetine 60 mg Oral Daily   folic acid 1 mg Oral Daily   heparin (porcine) 5,000 Units Subcutaneous Q8H [address]   [START ON 12/2/2017] methotrexate 2 5 mg Oral Weekly   mometasone 1 application Topical Daily   norethindrone 5 mg Oral HS   pantoprazole 40 mg Intravenous Q12H [address]   polyethylene glycol 17 g Oral BID   pregabalin 100 mg Oral BID        Today, Patient Was Seen By: Donald Neville MD    ** Please Note: Dragon 360 Dictation voice to text software may have been used in the creation of this document   **

## 2017-11-28 NOTE — OCCUPATIONAL THERAPY NOTE
Occupational Therapy Screen        Patient Name: Wayne Greenfield  YCRBE'A Date: 11/28/2017    Spoke to RN, Rajwinder Vazquez and reported pt w/ no acute inpt OT needs at this time  D/C OT services at this time  Please contact OT department if any changes in status occur      Priscila Nazario MS, OTR/L

## 2017-11-28 NOTE — CONSULTS
Consultation - Inpatient Pain Management   Shreya Juan 32 y o  female MRN: [id number]  Unit/Bed#: -01 Encounter: [id number]               Assessment/Plan     Assessment:     Principal Problem:    Epigastric pain  Active Problems:    Psoriatic arthritis ([address] )    Endometriosis    Fibromyalgia muscle pain    History of cholecystectomy      Plan/Recommendations:   Acute on chronic abdominal pain  · D/C Dilaudid 0 2mg dose  · Continue Dilaudid 0 5mg IV Q3 hours PRN breakthrough pain until final plan of care is determined  · Follow up with surgical consultation for hiatal hernia  · If no further work-up or intervention is planned would stop IV Dilaudid and transition to oral opioids for a brief course to wean off  · Recommend Roxicodone (oxycodone liquid) 5mg PO Q8 hours PRN for breakthrough pain and continue to wean opioids until off over the next few days  · Opioids are not recommended to manage abdominal/epigastric pain and are best avoided due to the risk of adverse effects on the GI system and worsening GI Motility  Bowel Management  - Fecal stasis on CT scan  - GI Suggesting outpatient gastric emptying study  - Adjust bowel regimen, no recent BM; add Dulcolax suppository     Reviewed with SLIM and Anesthesia attending, Dr Del Rio Prom       History of Present Illness    Admit Date:  11/25/2017  Hospital Day:  1 day  Primary Service:  General Medicine  Attending Provider:  Lazaro Lopez MD  Physician Requesting Consult: Lazaro Lopez MD  Reason for Consult / Principal Problem: acute on chronic pain  HPI: Shreya Juan is a 32y o  year old female who presents with increased epigastric pain which worsened with eating and drinking  Multiple episodes of epigastric related pain over the past few years, sp cholecystectomy in 2014  Ongoing reports of severe epigastric pain since admission which has overall about the same; IV opioids provide mild, brief pain control   History of fibromyalgia, endometriosis, anxiety and psoriatic arthritis; on Lyrica and Cymbalta at home  I have reviewed the patient's controlled substance dispensing history in the Prescription Drug Monitoring Program in compliance with the [facility] regulations before recommending any controlled substances  Review of Systems:  + esophageal spasms  + epigastric pain  + constipation, LBM Saturday  + impaired swallowing    Pain History:  Current pain location(s): epigastric  Pain Scale:   4-9  Severity:  Moderate to severe  Quality: cramping, spasm  Aggravating and alleviating factors: eating/drinking intensifies the pain   Treatment History:  Patient resting in bed, appears comfortable  Ongoing complaints of epigastric pain and spasm with eating/drinking  EGD today revealed hiatal hernia and general surgery was consulted to comment  IV Dilaudid provides short, temporary pain relief  Received Toradol and Versed after EGD with mild, brief relief  Current Analgesic regimen:  Cymbalta 60mg daily, Lyrica 100mg BID, Tylenol PRN, Flexeril 10mg TID PRN (zero), Dilaudid 0 2mg or 0 5mg IV Q3 PRN (total 1 7mg) Ativan PRN, Valium PRN  Bowel Regimen: Miralax BID, Dulcolax PRN     Historical Information   Past Medical History:   Diagnosis Date    Abdominal discomfort     Anxiety     Arthritis     Endometritis     Fibromyalgia     GERD (gastroesophageal reflux disease)     Psoriatic arthritis (HCC)     Shortness of breath     exacerbated with rib pain     Past Surgical History:   Procedure Laterality Date    ABDOMINAL SURGERY      CHOLECYSTECTOMY      EGD AND COLONOSCOPY      LAPAROSCOPY      for endometriosis     Social History   History   Alcohol Use    Yes     History   Drug Use No     History   Smoking Status    Never Smoker   Smokeless Tobacco    Never Used     Family History: History reviewed  No pertinent family history      Meds/Allergies   Prior to Admission Medications  Prescriptions Prior to Admission   Medication    ALPRAZolam Jossy Nguyen 1 mg tablet    cholecalciferol (VITAMIN D3) 1,000 units tablet    clonazePAM (KlonoPIN) 0 5 mg tablet    cyclobenzaprine (FLEXERIL) 5 mg tablet    diazepam (VALIUM) 5 mg tablet    DULoxetine (CYMBALTA) 60 mg delayed release capsule    folic acid (FOLVITE) 1 mg tablet    methotrexate 2 5 mg tablet    mometasone (ELOCON) 0 1 % cream    norethindrone (AYGESTIN) 5 mg tablet    omeprazole (PriLOSEC) 40 MG capsule    pregabalin (LYRICA) 100 mg capsule     Hospital Medications  Current Facility-Administered Medications   Medication Dose Route Frequency    acetaminophen (TYLENOL) tablet 650 mg  650 mg Oral Q6H PRN    bisacodyl (DULCOLAX) rectal suppository 10 mg  10 mg Rectal Daily PRN    cholecalciferol (VITAMIN D3) tablet 1,000 Units  1,000 Units Oral Daily    cyclobenzaprine (FLEXERIL) tablet 10 mg  10 mg Oral TID PRN    dextrose 5 % and sodium chloride 0 9 % infusion  75 mL/hr Intravenous Continuous    diazepam (VALIUM) tablet 5 mg  5 mg Oral Q8H PRN    DULoxetine (CYMBALTA) delayed release capsule 60 mg  60 mg Oral Daily    folic acid (FOLVITE) tablet 1 mg  1 mg Oral Daily    heparin (porcine) subcutaneous injection 5,000 Units  5,000 Units Subcutaneous Q8H [address]    HYDROmorphone (DILAUDID) 1 mg/mL injection 0 5 mg  0 5 mg Intravenous Q3H PRN    LORazepam (ATIVAN) 2 mg/mL injection 0 5 mg  0 5 mg Intravenous Q6H PRN    [START ON 12/2/2017] methotrexate tablet 2 5 mg  2 5 mg Oral Weekly    metoclopramide (REGLAN) injection 10 mg  10 mg Intravenous Q6H PRN    mometasone (ELOCON) 0 1 % cream 1 application  1 application Topical Daily    norethindrone (AYGESTIN) tablet 5 mg  5 mg Oral HS    ondansetron (ZOFRAN) injection 4 mg  4 mg Intravenous Q6H PRN    pantoprazole (PROTONIX) injection 40 mg  40 mg Intravenous Q12H [facility]    polyethylene glycol (MIRALAX) packet 17 g  17 g Oral BID    pregabalin (LYRICA) capsule 100 mg  100 mg Oral BID       No Known Allergies    Objective   Temp:  [97 2 °F (36 2 °C)-98 2 °F (36 8 °C)] 98 2 °F (36 8 °C)  HR:  [67-90] 82  Resp:  [16-20] 20  BP: (105-184)/(67-88) 116/80    Intake/Output Summary (Last 24 hours) at 11/28/17 1456  Last data filed at 11/28/17 1105   Gross per 24 hour   Intake          1643 75 ml   Output                0 ml   Net          1643 75 ml       Physical Exam:  General Appearance:    Alert, cooperative, no distress, appears stated age   Neurological:   Oriented to person, place, and time   Head:    Normocephalic, without obvious abnormality, atraumatic   Eyes:    EOM's intact   Back:     Symmetric, no curvature, ROM normal   Lungs:     Clear to auscultation bilaterally, respirations unlabored   Chest Wall:    No tenderness or deformity   Abdomen:        Large, Soft, no distention, bowel sounds hypoactive    Heart:    Regular rate and rhythm, S1 and S2 normal   Extremities:   Extremities no edema, intact sensation to light touch   Pulses:   2+ and symmetric all extremities   Skin:   Skin color pale     Lab Results:   Results from last 7 days  Lab Units 11/28/17  0512   WBC Thousand/uL 7 15   HEMOGLOBIN g/dL 13 1   HEMATOCRIT % 39 7   PLATELETS Thousands/uL 260      Results from last 7 days  Lab Units 11/28/17  0512   SODIUM mmol/L 139   POTASSIUM mmol/L 3 7   CHLORIDE mmol/L 108   CO2 mmol/L 26   BUN mg/dL 11   CREATININE mg/dL 1 09   CALCIUM mg/dL 8 6   TOTAL PROTEIN g/dL 6 7   BILIRUBIN TOTAL mg/dL 1 18*   ALK PHOS U/L 65   ALT U/L 48   AST U/L 23   GLUCOSE RANDOM mg/dL 103     Imaging Studies: I have personally reviewed pertinent reports  Counseling / Coordination of Care  Total floor / unit time spent today 45 minutes  Greater than 50% of total time was spent with the patient and / or family counseling and / or coordination of care   A description of the counseling / coordination of care: Reviewed plan of care and medications with patient, RN staff and primary care team     Altagracia Villarreal MS, RN-BC  Acute Pain

## 2017-11-29 ENCOUNTER — APPOINTMENT (INPATIENT)
Dept: RADIOLOGY | Facility: HOSPITAL | Age: 27
DRG: 254 | End: 2017-11-29
Payer: COMMERCIAL

## 2017-11-29 LAB
TROPONIN I SERPL-MCNC: <0.02 NG/ML
TROPONIN I SERPL-MCNC: <0.02 NG/ML

## 2017-11-29 PROCEDURE — G8980 MOBILITY D/C STATUS: HCPCS

## 2017-11-29 PROCEDURE — C9113 INJ PANTOPRAZOLE SODIUM, VIA: HCPCS | Performed by: NURSE PRACTITIONER

## 2017-11-29 PROCEDURE — 97162 PT EVAL MOD COMPLEX 30 MIN: CPT

## 2017-11-29 PROCEDURE — 74220 X-RAY XM ESOPHAGUS 1CNTRST: CPT

## 2017-11-29 PROCEDURE — 84484 ASSAY OF TROPONIN QUANT: CPT | Performed by: NURSE PRACTITIONER

## 2017-11-29 PROCEDURE — G8978 MOBILITY CURRENT STATUS: HCPCS

## 2017-11-29 PROCEDURE — G8979 MOBILITY GOAL STATUS: HCPCS

## 2017-11-29 RX ORDER — OXYCODONE HCL 5 MG/5 ML
5 SOLUTION, ORAL ORAL DAILY
Status: DISCONTINUED | OUTPATIENT
Start: 2017-12-05 | End: 2017-11-30 | Stop reason: HOSPADM

## 2017-11-29 RX ORDER — OXYCODONE HCL 5 MG/5 ML
5 SOLUTION, ORAL ORAL 2 TIMES DAILY
Status: DISCONTINUED | OUTPATIENT
Start: 2017-12-02 | End: 2017-11-30 | Stop reason: HOSPADM

## 2017-11-29 RX ORDER — OXYCODONE HCL 5 MG/5 ML
5 SOLUTION, ORAL ORAL EVERY 8 HOURS
Status: DISCONTINUED | OUTPATIENT
Start: 2017-11-29 | End: 2017-11-30 | Stop reason: HOSPADM

## 2017-11-29 RX ORDER — FAMOTIDINE 20 MG/1
20 TABLET, FILM COATED ORAL 2 TIMES DAILY
Status: DISCONTINUED | OUTPATIENT
Start: 2017-11-29 | End: 2017-11-30 | Stop reason: HOSPADM

## 2017-11-29 RX ADMIN — METHOTREXATE SODIUM 10 MG: 2.5 TABLET ORAL at 18:43

## 2017-11-29 RX ADMIN — PREGABALIN 100 MG: 100 CAPSULE ORAL at 08:46

## 2017-11-29 RX ADMIN — DULOXETINE HYDROCHLORIDE 60 MG: 60 CAPSULE, DELAYED RELEASE ORAL at 12:11

## 2017-11-29 RX ADMIN — OXYCODONE HYDROCHLORIDE 5 MG: 5 SOLUTION ORAL at 17:22

## 2017-11-29 RX ADMIN — ONDANSETRON 4 MG: 2 INJECTION INTRAMUSCULAR; INTRAVENOUS at 03:21

## 2017-11-29 RX ADMIN — PANTOPRAZOLE SODIUM 40 MG: 40 INJECTION, POWDER, FOR SOLUTION INTRAVENOUS at 08:55

## 2017-11-29 RX ADMIN — LORAZEPAM 0.5 MG: 2 INJECTION INTRAMUSCULAR; INTRAVENOUS at 22:15

## 2017-11-29 RX ADMIN — HYDROMORPHONE HYDROCHLORIDE 0.5 MG: 1 INJECTION, SOLUTION INTRAMUSCULAR; INTRAVENOUS; SUBCUTANEOUS at 08:47

## 2017-11-29 RX ADMIN — HYDROMORPHONE HYDROCHLORIDE 0.5 MG: 1 INJECTION, SOLUTION INTRAMUSCULAR; INTRAVENOUS; SUBCUTANEOUS at 12:09

## 2017-11-29 RX ADMIN — HEPARIN SODIUM 5000 UNITS: 5000 INJECTION, SOLUTION INTRAVENOUS; SUBCUTANEOUS at 16:58

## 2017-11-29 RX ADMIN — VITAMIN D, TAB 1000IU (100/BT) 1000 UNITS: 25 TAB at 12:17

## 2017-11-29 RX ADMIN — NORETHINDRONE ACETATE 5 MG: 5 TABLET ORAL at 22:08

## 2017-11-29 RX ADMIN — PREGABALIN 100 MG: 100 CAPSULE ORAL at 20:56

## 2017-11-29 RX ADMIN — DEXTROSE AND SODIUM CHLORIDE 75 ML/HR: 5; 900 INJECTION, SOLUTION INTRAVENOUS at 06:28

## 2017-11-29 RX ADMIN — FAMOTIDINE 20 MG: 20 TABLET, FILM COATED ORAL at 16:58

## 2017-11-29 RX ADMIN — DEXTROSE AND SODIUM CHLORIDE 75 ML/HR: 5; 900 INJECTION, SOLUTION INTRAVENOUS at 20:55

## 2017-11-29 RX ADMIN — PANTOPRAZOLE SODIUM 40 MG: 40 INJECTION, POWDER, FOR SOLUTION INTRAVENOUS at 20:56

## 2017-11-29 RX ADMIN — FAMOTIDINE 20 MG: 20 TABLET, FILM COATED ORAL at 20:56

## 2017-11-29 RX ADMIN — LORAZEPAM 0.5 MG: 2 INJECTION INTRAMUSCULAR; INTRAVENOUS at 14:21

## 2017-11-29 RX ADMIN — OXYCODONE HYDROCHLORIDE 5 MG: 5 SOLUTION ORAL at 22:08

## 2017-11-29 RX ADMIN — HEPARIN SODIUM 5000 UNITS: 5000 INJECTION, SOLUTION INTRAVENOUS; SUBCUTANEOUS at 22:08

## 2017-11-29 RX ADMIN — HYDROMORPHONE HYDROCHLORIDE 0.5 MG: 1 INJECTION, SOLUTION INTRAMUSCULAR; INTRAVENOUS; SUBCUTANEOUS at 03:21

## 2017-11-29 RX ADMIN — METHOTREXATE SODIUM 2.5 MG: 2.5 TABLET ORAL at 16:59

## 2017-11-29 RX ADMIN — ONDANSETRON 4 MG: 2 INJECTION INTRAMUSCULAR; INTRAVENOUS at 17:24

## 2017-11-29 RX ADMIN — HEPARIN SODIUM 5000 UNITS: 5000 INJECTION, SOLUTION INTRAVENOUS; SUBCUTANEOUS at 07:00

## 2017-11-29 RX ADMIN — FOLIC ACID 1 MG: 1 TABLET ORAL at 12:18

## 2017-11-29 NOTE — PROGRESS NOTES
Progress Note - Anesthesia Acute Pain Management    Mamta Gomez 32 y o  female MRN: [id number]  Unit/Bed#: -01 Encounter: [id number]      Assessment:   Principal Problem:    Epigastric pain  Active Problems:    Psoriatic arthritis ([address] )    Endometriosis    Fibromyalgia muscle pain    History of cholecystectomy      Plan:   - Pt reports continued severe abdominal pain, no changes  - No surgical intervention planned, recommendations for outpatient follow up  D/w SLIM, plan for potential d/c tomorrow   - Recommend:   - Stop IV Dilaudid and transition to oral opioids for a brief course to wean off    - Recommend using Roxicodone (oxycodone liquid) 5mg PO Q8 hours PRN for breakthrough pain and continue to wean opioids until off over the next few days     - Opioids are not recommended to manage abdominal/epigastric pain and are best avoided due to the risk of adverse effects on the GI system such as worsening GI motility  I discussed this with the pt, however, she is very upset by the recommendation to d/c IV opioid  She states that she will refuse to leave the hospital tomorrow if her pain is not controlled      - d/w SLIM attending, Dr Coley Abt    Pain History  Current pain location(s): abdomen  Pain Scale: 9/10    Meds/Allergies   all current active meds have been reviewed    No Known Allergies    Objective     Temp:  [97 9 °F (36 6 °C)-98 9 °F (37 2 °C)] 98 9 °F (37 2 °C)  HR:  [67-78] 67  Resp:  [18-20] 18  BP: ()/(58-98) 99/60      Intake/Output Summary (Last 24 hours) at 11/29/17 1406  Last data filed at 11/29/17 0815   Gross per 24 hour   Intake           2962 5 ml   Output               50 ml   Net           2912 5 ml                 Physical Exam: BP 99/60   Pulse 67   Temp 98 9 °F (37 2 °C) (Oral)   Resp 18   Ht 5' 6" (1 676 m)   Wt 76 7 kg (169 lb)   SpO2 97%   BMI 27 28 kg/m²   Gen: NAD  HEENT:  PER, EOMI  CV: deferred  Pul: deferred  Abd: deferred  Ext:  No cyanosis or edema  Neuro: AAOx3; CN II-XII grossly intact; moves all extremities  Psych: Appropriate    Lab Results:  Lab Results   Component Value Date    WBC 7 15 11/28/2017    HGB 13 1 11/28/2017    HCT 39 7 11/28/2017    MCV 87 11/28/2017     11/28/2017     Lab Results   Component Value Date    GLUCOSE 103 11/28/2017    CALCIUM 8 6 11/28/2017     11/28/2017    K 3 7 11/28/2017    CO2 26 11/28/2017     11/28/2017    BUN 11 11/28/2017    CREATININE 1 09 11/28/2017     Imaging Studies: I have personally reviewed pertinent reports  EKG, Pathology, and Other Studies: I have personally reviewed pertinent reports        SIGNATURE: Susan Nunes MD  DATE: November 29, 2017  TIME: 2:06 PM

## 2017-11-29 NOTE — PHYSICAL THERAPY NOTE
Physical Therapy Evaluation  Patient Name: Marc Alan    CKTIM'M Date: 11/29/2017     Problem List  Patient Active Problem List   Diagnosis    Epigastric pain    [address] ([facility] [address] )    Endometriosis    Fibromyalgia muscle pain    History of cholecystectomy        Past Medical History  Past Medical History:   Diagnosis Date    Abdominal discomfort     Anxiety     Arthritis     Endometritis     Fibromyalgia     GERD (gastroesophageal reflux disease)     Psoriatic arthritis ([facility] [address] )     Shortness of breath     exacerbated with rib pain        Past Surgical History  Past Surgical History:   Procedure Laterality Date    ABDOMINAL SURGERY      CHOLECYSTECTOMY      EGD AND COLONOSCOPY      ESOPHAGOGASTRODUODENOSCOPY N/A 11/28/2017    Procedure: ESOPHAGOGASTRODUODENOSCOPY (EGD); Surgeon: Jairo Gonzalez MD;  Location:  GI LAB;   Service: Gastroenterology    LAPAROSCOPY      for endometriosis      11/29/17 1817   Note Type   Note type Eval only   Pain Assessment   Pain Assessment 0-10   Pain Score 3   Pain Type Chronic pain   Pain Location Knee   Pain Orientation Bilateral   Pain Descriptors ("stiff" )   Pain Frequency Constant/continuous   Pain Onset Ongoing   Clinical Progression Gradually improving   Patient's Stated Pain Goal No pain   Home Living   Type of [address]  (does not use at baseline )   Prior Function   Level of [place] Independent with ADLs and functional mobility   Lives With Significant other  (fiancee)   Receives Help From Family   ADL Assistance Independent   IADLs Independent   Falls in the last 6 months 0   Comments indep at baseline -0 AD except w/ arthritic flairups   Restrictions/Precautions   Weight Bearing Precautions Per Order No   Other Precautions Pain;Multiple lines  (IV)   General   Family/Caregiver Present No   Cognition   Overall Cognitive Status [facility] Arousal/Participation Alert   Orientation Level Oriented X4   Memory Within functional limits   RUE Assessment   RUE Assessment WFL   LUE Assessment   LUE Assessment WFL   RLE Assessment   RLE Assessment WFL  (knee "stiffness" reported )   LLE Assessment   LLE Assessment WFL  (knee "stiffness" reported )   Light Touch   RLE Light Touch Grossly intact   LLE Light Touch Grossly intact   Bed Mobility   Supine to Sit 7  Independent   Sit to Supine 7  Independent   Transfers   Sit to Stand 7  Independent   Stand to Sit 7  Independent   Stand pivot 7  Independent   Additional items (slow and antalgic )   Ambulation/Elevation   Gait pattern Excessively slow;Decreased foot clearance   Gait Assistance 5  Supervision   Assistive Device None   Distance 200'    Stair Management Assistance (declines )   Balance   Static Sitting Normal   Dynamic Sitting Normal   Static Standing Normal   Dynamic Standing Good   Ambulatory Good   Activity Tolerance   Activity Tolerance Patient limited by fatigue   Nurse Made Aware yes   Assessment   Prognosis Good   Problem List Pain  (stiffness)   Assessment 32 y o  female s/p admit to [facility] on 11/25/2017 w/ c/o ongoing epigastric pain and abdominal pain  CT suggestive of possible hepatic lesion  Gastroenterology consulted  Pt has complicated PMHx including fibromyalgia; chronic pain; endometriosis and pelvic pain syndrome; psoriatic arthritis and PT was consulted to assist w/ mobility and d/c planning recommendations  Pt is undergoing continued w/u for abdominal pain  At baseline pt lives w/ fiancee and mobilizes independently and w/o AD- except when she is having an arthritic flair up  Pt will occasionally use a SPC when this occurs  She drives and performes ADL's indep  At preset pt demonstrates indep mobility including transfers off various surfaces in room and ambulates >200' w/ slow pattern- antalgic initially 2* reported "stiffness"- but improves w/ increased distances   Pt w/o LOB or difficulty noted- reaches out of ALLAN without difficulty and p/u objects from floor w/o difficulty  Declines stair training or need for stair training  From a PT perspective pt is safe for d/c home when medically cleared w/ no anticipated PT needs  Pt was educated on importance of mobilizing and ambulating in hospital to maintain CLOF and for improving knee stiffness  No further skilled PT indicated in pt setting  D/c PT  Pt pleasant and agreeable to plan and recommendations      Barriers to Discharge Comments none   Goals   Patient Goals figure o9ut what is wrong and go home    Treatment Day 0   Plan   Treatment/Interventions Spoke to nursing;Spoke to case management   Recommendation   Recommendation D/C PT   PT - OK to Discharge Yes   Modified [place] Scale   Modified [place] Scale 1   Barthel Index   Feeding 10   Bathing 5   Grooming Score 5   Dressing Score 10   Bladder Score 10   Bowels Score 10   Toilet Use Score 10   Transfers (Bed/Chair) Score 15   Mobility (Level Surface) Score 15   Stairs Score 0  (pt declines need to compete on eval- no issues reported )   Barthel Index Score 90   Evaristo Seen, PT

## 2017-11-29 NOTE — CONSULTS
Consultation - [facility] 32 y o  female MRN: [id number]  Unit/Bed#: -01 Encounter: [id number]      Chief Complaint: I feel anxious     History of Present Illness   Physician Requesting Consult: Paris Davis MD  Reason for Consult / Principal Problem: Zhang Bright is a 32 y o  female presents with epigastric abdominal pain worsening with eating and drinking  Patient states that she has multiple medical problems including fibromyalgia, endometriosis, psoriatic arthritis, and anxiety  Patient states that she feels very anxious because physicians have not been able to find out what is wrong with her despite multiple treatments and tests  Patient states that when she focuses on something she becomes very anxious  She has been on Xanax for that but she does not take it too often  She denies any other issues  She has good family support  She denies any suicidal thoughts, manic episodes, or psychotic symptoms  Psychiatric Review Of Systems:  sleep: no  appetite changes: no  weight changes: no  energy/anergy: no  interest/pleasure/anhedonia: no  somatic symptoms: no  anxiety/panic: yes  kacy: no  guilty/hopeless: no  self injurious behavior/risky behavior: no    Historical Information   Past Psychiatric History:   Therapy, Out Patient when her parents    Currently in treatment with none  Past Suicide attempts: none  Past Violent behavior: none  Past Psychiatric medication trial: none    Substance Abuse History:  Denies drugs or alcohol  I have assessed this patient for substance use within the past 12 months     History of IP/OP rehabilitation program: none  Smoking history: Social smoking  Family Psychiatric History: Mother- anxiety   Great aunt- committed suicide     Social History  Education: college graduate  Learning Disabilities: none  Marital history: Fiance  Living arrangement, social support: lives with fiance    Occupational History: unemployed  Functioning Relationships: good support system  Other Pertinent History: None    Traumatic History:   Abuse: none  Other Traumatic Events: none    Past Medical History:   Diagnosis Date    Abdominal discomfort     Anxiety     Arthritis     Endometritis     Fibromyalgia     GERD (gastroesophageal reflux disease)     Psoriatic arthritis (HCC)     Shortness of breath     exacerbated with rib pain       Medical Review Of Systems:  Review of Systems - Negative except lower abdominal pain, pain all over, nausea  All other review of systems is negative       Meds/Allergies   current meds:   Current Facility-Administered Medications   Medication Dose Route Frequency    acetaminophen (TYLENOL) tablet 650 mg  650 mg Oral Q6H PRN    bisacodyl (DULCOLAX) rectal suppository 10 mg  10 mg Rectal Daily PRN    cholecalciferol (VITAMIN D3) tablet 1,000 Units  1,000 Units Oral Daily    cyclobenzaprine (FLEXERIL) tablet 10 mg  10 mg Oral TID PRN    dextrose 5 % and sodium chloride 0 9 % infusion  75 mL/hr Intravenous Continuous    diazepam (VALIUM) tablet 5 mg  5 mg Oral Q8H PRN    DULoxetine (CYMBALTA) delayed release capsule 60 mg  60 mg Oral Daily    famotidine (PEPCID) tablet 20 mg  20 mg Oral BID    folic acid (FOLVITE) tablet 1 mg  1 mg Oral Daily    heparin (porcine) subcutaneous injection 5,000 Units  5,000 Units Subcutaneous Q8H [address]    HYDROmorphone (DILAUDID) 1 mg/mL injection 0 5 mg  0 5 mg Intravenous Q6H PRN    LORazepam (ATIVAN) 2 mg/mL injection 0 5 mg  0 5 mg Intravenous Q6H PRN    methotrexate tablet 2 5 mg  2 5 mg Oral Weekly    metoclopramide (REGLAN) injection 10 mg  10 mg Intravenous Q6H PRN    mometasone (ELOCON) 0 1 % cream 1 application  1 application Topical Daily    norethindrone (AYGESTIN) tablet 5 mg  5 mg Oral HS    ondansetron (ZOFRAN) injection 4 mg  4 mg Intravenous Q6H PRN    pantoprazole (PROTONIX) injection 40 mg  40 mg Intravenous Q12H [address]    polyethylene glycol (MIRALAX) packet 17 g  17 g Oral BID    pregabalin (LYRICA) capsule 100 mg  100 mg Oral BID     No Known Allergies    Objective   Vital signs in last 24 hours:  Temp:  [97 9 °F (36 6 °C)-98 9 °F (37 2 °C)] 98 9 °F (37 2 °C)  HR:  [67-78] 67  Resp:  [18-20] 18  BP: ()/(58-98) 99/60      Intake/Output Summary (Last 24 hours) at 11/29/17 1405  Last data filed at 11/29/17 0815   Gross per 24 hour   Intake           2962 5 ml   Output               50 ml   Net           2912 5 ml       Mental Status Evaluation:  Appearance:  age appropriate and casually dressed   Behavior:  cooperative   Speech:  normal pitch and normal volume   Mood:  anxious   Affect:  mood-congruent   Language: naming objects and repeating phrases   Thought Process:  goal directed   Associations: intact associations   Thought Content:  normal   Perceptual Disturbances: None   Risk Potential: Not suicidal or homical ideas, plan or intent      Sensorium:  person, place, time/date, situation and day of week   Memory:  recent and remote memory grossly intact   Cognition:  grossly intact   Consciousness:  alert and awake    Attention: attention span and concentration were age appropriate   Intellect: within normal limits   Fund of Knowledge: awareness of current events: fair   Insight:  fair   Judgment: fair   Muscle Strength and Tone: within normal limits   Gait/Station: normal gait/station   Motor Activity: no abnormal movements     Lab Results:    Lab Results   Component Value Date    WBC 7 15 11/28/2017    HGB 13 1 11/28/2017    HCT 39 7 11/28/2017    MCV 87 11/28/2017     11/28/2017     Lab Results   Component Value Date    GLUCOSE 103 11/28/2017    CALCIUM 8 6 11/28/2017     11/28/2017    K 3 7 11/28/2017    CO2 26 11/28/2017     11/28/2017    BUN 11 11/28/2017    CREATININE 1 09 11/28/2017         Code Status: )Level 1 - Full Code    Assessment/Plan     Assessment:  Wayne Greenfield is a 32 y o  female who was admitted for epigastric abdominal pain  She has multiple medical problems and this made her anxious  Diagnosis:  Generalized anxiety disorder F41 1    Plan:   Continue medical management  Continue Cymbalta 60 mg PO daily  I recommend individual therapy  Patient states that she is looking for one  I educated patient on relaxation technique  Discussed with primary team  No other intervention at this moment  I will sign off  Risks, benefits and possible side effects of Medications:   Risks, benefits, and possible side effects of medications explained to patient and patient verbalizes understanding             Sophia Leung MD

## 2017-11-29 NOTE — PROGRESS NOTES
GI Progress Note   Unit/Bed#: [facility] Encounter: [id number]      Alejandrina Mcdonald 32 y o  female [id number]    Hospital Stay Days: 2      Assessment/Plan:    Epigastric pain  Likely functional etiologies at this point given no structural abnormalities on an EGD except for a small hiatal hernia  Showed undergo esophageal manometry and pH testing as outpatient for further evaluation  Continue Protonix intravenous 40 mg  and Zofran as needed for nausea  Discontinue narcotics due to worsening constipation  OK from GI standpoint to discharge and follow up outpatient for further evaluation of epigastric pain  Constipation  Continue MiraLax twice a day and Dulcolax rectal suppository  Gastroparesis  Continue Reglan 10 mg intravenous q 6h  Should repeat gastric emptying study as outpatient off narcotics  Subjective:   Patient seen and examined today  She states that she is still having severe abdominal pain  She has been unable to eat any food secondary to severe pain  Other ROS was negative this time  Vitals: Temp (24hrs), Av 2 °F (36 8 °C), Min:97 9 °F (36 6 °C), Max:98 9 °F (37 2 °C)  Current: Temperature: 98 9 °F (37 2 °C)  Vitals:    17 1501 17 1926 17 2333 17 0815   BP: 112/70 135/98 117/58 99/60   Pulse: 73 77 78 67   Resp: 20 20 18 18   Temp: 98 1 °F (36 7 °C) 97 9 °F (36 6 °C) 98 °F (36 7 °C) 98 9 °F (37 2 °C)   TempSrc: Axillary Oral Tympanic Oral   SpO2: 97% 99% 100% 97%   Weight:       Height:        Body mass index is 27 28 kg/m²  I/O last 24 hours: In: 2982 5 [P O :200;  I V :2782 5]  Out: 50 [Urine:50]      Physical Exam: BP 99/60   Pulse 67   Temp 98 9 °F (37 2 °C) (Oral)   Resp 18   Ht 5' 6" (1 676 m)   Wt 76 7 kg (169 lb)   SpO2 97%   BMI 27 28 kg/m²   General appearance: alert, appears stated age and cooperative  HEENT: Normocephalic, without obvious abnormality, atraumatic, moist mucous membranes  Heart: regular rate and rhythm, S1, S2 normal, no murmur, click, rub or gallop  Abdomen: Soft, nondistended, normal bowel sounds  Epigastric tenderness  Extremities: extremities normal, atraumatic, no cyanosis or edema     Invasive Devices     Peripheral Intravenous Line            Peripheral IV 11/28/17 Left Arm less than 1 day                          Labs: No results found for this or any previous visit (from the past 24 hour(s))  Radiology Results: I have personally reviewed pertinent reports  Other Diagnostic Testing:   I have personally reviewed pertinent reports          Active Meds:   Current Facility-Administered Medications   Medication Dose Route Frequency    acetaminophen (TYLENOL) tablet 650 mg  650 mg Oral Q6H PRN    bisacodyl (DULCOLAX) rectal suppository 10 mg  10 mg Rectal Daily PRN    cholecalciferol (VITAMIN D3) tablet 1,000 Units  1,000 Units Oral Daily    cyclobenzaprine (FLEXERIL) tablet 10 mg  10 mg Oral TID PRN    dextrose 5 % and sodium chloride 0 9 % infusion  75 mL/hr Intravenous Continuous    diazepam (VALIUM) tablet 5 mg  5 mg Oral Q8H PRN    DULoxetine (CYMBALTA) delayed release capsule 60 mg  60 mg Oral Daily    folic acid (FOLVITE) tablet 1 mg  1 mg Oral Daily    heparin (porcine) subcutaneous injection 5,000 Units  5,000 Units Subcutaneous Q8H [address]    HYDROmorphone (DILAUDID) 1 mg/mL injection 0 5 mg  0 5 mg Intravenous Q3H PRN    LORazepam (ATIVAN) 2 mg/mL injection 0 5 mg  0 5 mg Intravenous Q6H PRN    [START ON 12/2/2017] methotrexate tablet 2 5 mg  2 5 mg Oral Weekly    metoclopramide (REGLAN) injection 10 mg  10 mg Intravenous Q6H PRN    mometasone (ELOCON) 0 1 % cream 1 application  1 application Topical Daily    norethindrone (AYGESTIN) tablet 5 mg  5 mg Oral HS    ondansetron (ZOFRAN) injection 4 mg  4 mg Intravenous Q6H PRN    pantoprazole (PROTONIX) injection 40 mg  40 mg Intravenous Q12H [address]    polyethylene glycol (MIRALAX) packet 17 g  17 g Oral BID    pregabalin (LYRICA) capsule 100 mg  100 mg Oral BID         VTE Pharmacologic Prophylaxis: Heparin  VTE Mechanical Prophylaxis: reason for no mechanical VTE prophylaxis low risk    CORY Rich    Internal Medicine PGY-1  11/29/2017 11:13 AM  Pager [phone]

## 2017-11-29 NOTE — PROGRESS NOTES
[facility] Progress Note  Patient: Brennon Malik 32 y o  female   MRN: [id number]  PCP: Aldo Alves MD  Unit/Bed#: [facility] Encounter: [id number]  Date Of Visit: 11/29/17    Assessment:  Principal Problem:    Epigastric pain  Active Problems:    Psoriatic arthritis ([address] )    Endometriosis    Fibromyalgia muscle pain    History of cholecystectomy    Plan:  Epigastric pain:    GI and surgery following  Patient refusing solids and liquids due to epigastric pain  EGD 11/28 revealed a normal esophagus, hiatal hernia, normal stomach, and normal duodenum  Multiple biopsies were taken from the esophagus, stomach, duodenum  Patient requested a surgical consultation for possible intervention to her hiatal hernia - thoracic surgery consulted - no surgical intervention at this time  Barium esophagus due to the complaints of dysphagia - unremarkable   Per GI, recommend elevating head of bed, avoid smoking, avoid excess coffee , tea, and other caffeinated beverages, follow-up result results of biopsy specimens, and resume diet  From a GI standpoint patient is stable for discharge with plans for outpatient pH/impedance testing, esophageal manometry, and gastric emptying study while patient is off narcotics - pt has an appt scheduled 11/30 at 2pm in the GI lab for esophageal manometry  Holding Reglan prior to procedure  Advise not to take opioids prior to procedure  Chronic opioid dependence:  SLIM has discussed with the Acute Pain Management team that if no surgery is planned the patient should discontinue IV Dilaudid and start oxycodone solution 5 mg t i d  scheduled for 3 days to be tapered afterwards to b i d  for 3 days and then daily for 3 days until discontinued  Tapered IV Dilaudid from Q3hrs to Q6hrs prn  MiraLax increased to b i d      Fibromyalgia:  Continue with current regimen    Psoriatic arthritis:  Continue methotrexate and follow up with outpatient rheumatologist    Endometriosis: Continue with pelvic pain specialist at [address] [place] [address]    VTE Pharmacologic Prophylaxis:   Pharmacologic: Heparin  Mechanical: Mechanical VTE prophylaxis in place  Patient Centered Rounds: I have performed bedside rounds with nursing staff today  Discussions with Specialists or Other Care Team Provider: Nursing, CM  Education and Discussions with Family / Patient: I have answered all questions to the best of my ability  Time Spent for Care: 20 minutes  More than 50% of total time spent on counseling and coordination of care as described above  Current Length of Stay: 2 day(s)  Current Patient Status: Inpatient   Certification Statement: The patient will continue to require additional inpatient hospital stay due to Intractable abdominal pain unable to tolerate oral intake, pending surgical evaluation    Discharge Plan:  Patient is not medically stable for discharge today as she is unable tolerate oral intake, continues with intractable abdominal pain, and is pending a surgical evaluation  Hopefully home in 1-2 days if able to be discontinued from IV Dilaudid and can tolerate oral intake  Code Status: Level 1 - Full Code    Subjective:   Extremely upset and anxious regarding epigastric pain, worse with eating solids or liquids  Agreeing to Oxycodone 5mg TID with tapered Dilaudid for breakthrough pain  Willing to be discharged with plan to follow-up in the GI lab tomorrow at 2pm  Denies HA, lightheadedness, SOB, vomiting, or diarrhea  Objective:   Vitals:   Temp (24hrs), Av 2 °F (36 8 °C), Min:97 9 °F (36 6 °C), Max:98 9 °F (37 2 °C)    HR:  [67-90] 67  Resp:  [16-20] 18  BP: ()/(58-98) 99/60  SpO2:  [97 %-100 %] 97 %  Body mass index is 27 28 kg/m²  Input and Output Summary (last 24 hours):        Intake/Output Summary (Last 24 hours) at 17 0924  Last data filed at 17 5550   Gross per 24 hour   Intake           2982 5 ml   Output               50 ml   Net 2932 5 ml       Physical Exam:     Physical Exam   Constitutional: She is oriented to person, place, and time  She appears well-developed  No distress  HENT:   Head: Normocephalic  Neck: Normal range of motion  Cardiovascular: Normal rate and regular rhythm  Pulmonary/Chest: Effort normal and breath sounds normal  No respiratory distress  She has no wheezes  She has no rhonchi  She has no rales  Abdominal: Soft  Bowel sounds are normal  She exhibits no distension  There is tenderness  There is guarding  There is no rebound  Musculoskeletal: Normal range of motion  She exhibits no edema or tenderness  Neurological: She is alert and oriented to person, place, and time  Skin: Skin is warm and dry  No rash noted  She is not diaphoretic  Psychiatric: Her speech is normal and behavior is normal  Her affect is labile  Cognition and memory are normal  She expresses inappropriate judgment  Nursing note and vitals reviewed  Additional Data:   Labs:    Results from last 7 days  Lab Units 11/28/17  0512   WBC Thousand/uL 7 15   HEMOGLOBIN g/dL 13 1   HEMATOCRIT % 39 7   PLATELETS Thousands/uL 260   NEUTROS PCT % 55   LYMPHS PCT % 36   MONOS PCT % 6   EOS PCT % 3       Results from last 7 days  Lab Units 11/28/17  0512   SODIUM mmol/L 139   POTASSIUM mmol/L 3 7   CHLORIDE mmol/L 108   CO2 mmol/L 26   BUN mg/dL 11   CREATININE mg/dL 1 09   CALCIUM mg/dL 8 6   TOTAL PROTEIN g/dL 6 7   BILIRUBIN TOTAL mg/dL 1 18*   ALK PHOS U/L 65   ALT U/L 48   AST U/L 23   GLUCOSE RANDOM mg/dL 103           * I Have Reviewed All Lab Data Listed Above  * Additional Pertinent Lab Tests Reviewed: All Labs Within Last 24 Hours Reviewed    Imaging:    Imaging Reports Reviewed Today Include: No results found      Cultures:   Blood Culture: No results found for: BLOODCX  Urine Culture: No results found for: URINECX  Sputum Culture: No components found for: SPUTUMCX  Wound Culture: No results found for: WOUNDCULT    Last 24 Hours Medication List:     cholecalciferol 1,000 Units Oral Daily   DULoxetine 60 mg Oral Daily   folic acid 1 mg Oral Daily   heparin (porcine) 5,000 Units Subcutaneous Q8H [address]   [START ON 12/2/2017] methotrexate 2 5 mg Oral Weekly   mometasone 1 application Topical Daily   norethindrone 5 mg Oral HS   pantoprazole 40 mg Intravenous Q12H [address]   polyethylene glycol 17 g Oral BID   pregabalin 100 mg Oral BID        Today, Patient Was Seen By: RICARDO Rey    ** Please Note: Dragon 360 Dictation voice to text software may have been used in the creation of this document   **

## 2017-11-29 NOTE — CONSULTS
Consultation - Thoracic Surgery   Lizeth Arreola 32 y o  female MRN: [id number]  Unit/Bed#: -01 Encounter: [id number]      Assessment/Plan     Assessment:  32 y o  F w/ type I hiatal hernia    Seen on EGD, not present on CT abd; EGD otherwise normal; no clear evidence of reflux esophagitis or gastritis on EGD  Plan:  F/u barium swallow   Recommend esophageal manometry  24 hr pH and impedance off PPI  These can be done as outpatient  Outpatient thoracic surgery follow up after obtaining motility studies    History of Present Illness   Reason for Consult / Principal Problem: Hiatal hernia  HPI: Lizeth Arreola is a 32y o  year old female who presents with several year history of episodic epigastric abdominal pain  Patient was initially evaluated 2014 at [facility] was diagnosed with cholecystitis for which she underwent cholecystectomy  The patient had a recurrence in her pain in 2016 and was diagnosed with sphincter of Oddi dysfunction in subsequently underwent ERCP with biliary stent placement at [facility]  She has also undergone diagnostic laparoscopy where she was found to have endometriosis  Her pain spontaneously resolved until approximately 2 weeks ago where she had severe epigastric pain worsened with eating and drinking  The patient was seen at [facility] and was discharged on Protonix  She has not had resolution of her symptoms  Patient states that her pain is 10/10 and radiates into her chest primarily after meals  She has had associated nausea and vomiting  She has been unable to tolerate p o  as she has both pain and difficulty swallowing  She denies previous PPH or manometry testing  She has had gastric emptying study in the past that demonstrated gastroparesis, however this is while she was on narcotic therapy  She denies significant reflux unless she is eating spicy foods    She was recently started on methotrexate and prednisone for new diagnosis of psoriatic arthritis however states that she is otherwise healthy  She underwent CT abdomen pelvis at [facility] that showed small hepatic mass, likely cyst versus hemangioma per radiology  She subsequently underwent EGD with GI that demonstrated a small type 1 hiatal hernia  We have been consulted for further recommendation  Inpatient consult to Thoracic Surgery  Performed by: Meng Lowry  Authorized by: Zuhair Scott           Review of Systems   Constitutional: Positive for appetite change and unexpected weight change  Negative for activity change  HENT: Positive for trouble swallowing  Eyes: Negative  Respiratory: Negative  Cardiovascular: Positive for chest pain  Gastrointestinal: Positive for abdominal pain, constipation and nausea  Endocrine: Negative  Genitourinary: Negative  Musculoskeletal: Negative  Skin: Negative  Allergic/Immunologic: Negative  Neurological: Negative  Hematological: Negative  Psychiatric/Behavioral: Negative  Historical Information   Past Medical History:   Diagnosis Date    Abdominal discomfort     Anxiety     Arthritis     Endometritis     Fibromyalgia     GERD (gastroesophageal reflux disease)     Psoriatic arthritis (HCC)     Shortness of breath     exacerbated with rib pain     Past Surgical History:   Procedure Laterality Date    ABDOMINAL SURGERY      CHOLECYSTECTOMY      EGD AND COLONOSCOPY      ESOPHAGOGASTRODUODENOSCOPY N/A 11/28/2017    Procedure: ESOPHAGOGASTRODUODENOSCOPY (EGD); Surgeon: Romina García MD;  Location: [facility];   Service: Gastroenterology    LAPAROSCOPY      for endometriosis     History   Alcohol Use    Yes     History   Drug Use No     History   Smoking Status    Never Smoker   Smokeless Tobacco    Never Used     Family History: non-contributory    Meds/Allergies   all current active meds have been reviewed  No Known Allergies    Objective   Vitals: Blood pressure 99/60, pulse 67, temperature 98 9 °F (37 2 °C), temperature source Oral, resp  rate 18, height 5' 6" (1 676 m), weight 76 7 kg (169 lb), SpO2 97 %  Invasive Devices     Peripheral Intravenous Line            Peripheral IV 11/28/17 Left Arm less than 1 day                Physical Exam   Constitutional: She is oriented to person, place, and time  She appears well-developed  No distress  HENT:   Head: Normocephalic  Cardiovascular: Normal rate and regular rhythm  Pulmonary/Chest: Effort normal  No respiratory distress  Abdominal: Soft  She exhibits no distension  There is no tenderness  Musculoskeletal: Normal range of motion  Neurological: She is alert and oriented to person, place, and time  Skin: Skin is warm and dry  Lab Results: CBC: No results found for: WBC, HGB, HCT, MCV, PLT, ADJUSTEDWBC, MCH, MCHC, RDW, MPV, NRBC, CMP: No results found for: NA, CL, CO2, ANIONGAP, BUN, CREATININE, GLUCOSE, CALCIUM, AST, ALT, ALKPHOS, PROT, ALBUMIN, BILITOT, EGFR, Magnesium: No components found for: MAG  Imaging Studies: I have personally reviewed pertinent reports  and I have personally reviewed pertinent films in PACS  EKG, Pathology, and Other Studies: I have personally reviewed pertinent reports      VTE Prophylaxis: Heparin    Code Status: Level 1 - Full Code  Advance Directive and Living Will:      Power of : Yes  POLST:

## 2017-11-30 ENCOUNTER — HOSPITAL ENCOUNTER (OUTPATIENT)
Dept: GASTROENTEROLOGY | Facility: HOSPITAL | Age: 27
DRG: 254 | End: 2017-11-30
Payer: COMMERCIAL

## 2017-11-30 ENCOUNTER — HOSPITAL ENCOUNTER (OUTPATIENT)
Dept: GASTROENTEROLOGY | Facility: HOSPITAL | Age: 27
Discharge: HOME | DRG: 254 | End: 2017-11-30
Payer: COMMERCIAL

## 2017-11-30 VITALS
HEIGHT: 66 IN | TEMPERATURE: 97.7 F | HEART RATE: 90 BPM | BODY MASS INDEX: 26.52 KG/M2 | SYSTOLIC BLOOD PRESSURE: 121 MMHG | WEIGHT: 165 LBS | RESPIRATION RATE: 18 BRPM | DIASTOLIC BLOOD PRESSURE: 64 MMHG | OXYGEN SATURATION: 98 %

## 2017-11-30 VITALS
OXYGEN SATURATION: 98 % | SYSTOLIC BLOOD PRESSURE: 105 MMHG | HEART RATE: 68 BPM | DIASTOLIC BLOOD PRESSURE: 69 MMHG | HEIGHT: 66 IN | RESPIRATION RATE: 18 BRPM | TEMPERATURE: 98.6 F | WEIGHT: 169 LBS | BODY MASS INDEX: 27.16 KG/M2

## 2017-11-30 LAB — TROPONIN I SERPL-MCNC: <0.02 NG/ML

## 2017-11-30 PROCEDURE — 91038 ESOPH IMPED FUNCT TEST > 1HR: CPT

## 2017-11-30 PROCEDURE — 84484 ASSAY OF TROPONIN QUANT: CPT | Performed by: HOSPITALIST

## 2017-11-30 PROCEDURE — 91020 GASTRIC MOTILITY STUDIES: CPT

## 2017-11-30 RX ORDER — POLYETHYLENE GLYCOL 3350 17 G/17G
17 POWDER, FOR SOLUTION ORAL 2 TIMES DAILY
Qty: 14 EACH | Refills: 0 | Status: ON HOLD | OUTPATIENT
Start: 2017-11-30 | End: 2022-03-18 | Stop reason: ALTCHOICE

## 2017-11-30 RX ORDER — OXYCODONE HCL 5 MG/5 ML
SOLUTION, ORAL ORAL
Qty: 200 ML | Refills: 0 | Status: ON HOLD | OUTPATIENT
Start: 2017-11-30 | End: 2022-03-18 | Stop reason: ALTCHOICE

## 2017-11-30 RX ADMIN — FOLIC ACID 1 MG: 1 TABLET ORAL at 09:16

## 2017-11-30 RX ADMIN — OXYCODONE HYDROCHLORIDE 5 MG: 5 SOLUTION ORAL at 06:30

## 2017-11-30 RX ADMIN — PREGABALIN 100 MG: 100 CAPSULE ORAL at 09:16

## 2017-11-30 RX ADMIN — POLYETHYLENE GLYCOL 3350 17 G: 17 POWDER, FOR SOLUTION ORAL at 09:16

## 2017-11-30 RX ADMIN — ONDANSETRON 4 MG: 2 INJECTION INTRAMUSCULAR; INTRAVENOUS at 07:45

## 2017-11-30 RX ADMIN — LORAZEPAM 0.5 MG: 2 INJECTION INTRAMUSCULAR; INTRAVENOUS at 07:45

## 2017-11-30 RX ADMIN — DULOXETINE HYDROCHLORIDE 60 MG: 60 CAPSULE, DELAYED RELEASE ORAL at 09:17

## 2017-11-30 RX ADMIN — VITAMIN D, TAB 1000IU (100/BT) 1000 UNITS: 25 TAB at 09:16

## 2017-11-30 RX ADMIN — HEPARIN SODIUM 5000 UNITS: 5000 INJECTION, SOLUTION INTRAVENOUS; SUBCUTANEOUS at 06:30

## 2017-11-30 NOTE — NURSING NOTE
Pt  Discharge home with significant other  Prescription medication givento pt  As well as discharge information  Pt  verbalized of the inforamtion provided

## 2017-11-30 NOTE — PERIOPERATIVE NURSING NOTE
Manometry catheter placed down right side  Pt tolerated 20 liquid swallows  Catheter removed without difficulty  PH probe placed down right side at 35 cm  Pt tolerated well  Pt and significant other taught zephr monitor using teach back method  Pt left via wheelchair  Pt was complaining of abd pain when leaving

## 2017-12-01 ENCOUNTER — HOSPITAL ENCOUNTER (OUTPATIENT)
Facility: HOSPITAL | Age: 27
Setting detail: OBSERVATION
Discharge: HOME | End: 2017-12-02
Attending: EMERGENCY MEDICINE | Admitting: HOSPITALIST
Payer: COMMERCIAL

## 2017-12-01 DIAGNOSIS — R10.9 INTRACTABLE ABDOMINAL PAIN: Primary | ICD-10-CM

## 2017-12-01 PROBLEM — Z76.5 DRUG-SEEKING BEHAVIOR: Status: ACTIVE | Noted: 2017-12-01

## 2017-12-01 PROBLEM — Z90.49 HISTORY OF CHOLECYSTECTOMY: Chronic | Status: ACTIVE | Noted: 2017-11-25

## 2017-12-01 PROBLEM — Z76.5 DRUG-SEEKING BEHAVIOR: Chronic | Status: ACTIVE | Noted: 2017-12-01

## 2017-12-01 LAB
ALBUMIN SERPL BCP-MCNC: 3.9 G/DL (ref 3.5–5)
ALP SERPL-CCNC: 59 U/L (ref 46–116)
ALT SERPL W P-5'-P-CCNC: 29 U/L (ref 12–78)
ANION GAP SERPL CALCULATED.3IONS-SCNC: 7 MMOL/L (ref 4–13)
AST SERPL W P-5'-P-CCNC: 13 U/L (ref 5–45)
BACTERIA UR QL AUTO: ABNORMAL /HPF
BASOPHILS # BLD AUTO: 0.02 THOUSANDS/ΜL (ref 0–0.1)
BASOPHILS NFR BLD AUTO: 0 % (ref 0–1)
BILIRUB SERPL-MCNC: 1.3 MG/DL (ref 0.2–1)
BILIRUB UR QL STRIP: ABNORMAL
BUN SERPL-MCNC: 8 MG/DL (ref 5–25)
CALCIUM SERPL-MCNC: 9.6 MG/DL (ref 8.3–10.1)
CHLORIDE SERPL-SCNC: 105 MMOL/L (ref 100–108)
CLARITY UR: CLEAR
CO2 SERPL-SCNC: 27 MMOL/L (ref 21–32)
COLOR UR: YELLOW
COLOR, POC: YELLOW
CREAT SERPL-MCNC: 0.94 MG/DL (ref 0.6–1.3)
EOSINOPHIL # BLD AUTO: 0.1 THOUSAND/ΜL (ref 0–0.61)
EOSINOPHIL NFR BLD AUTO: 2 % (ref 0–6)
ERYTHROCYTE [DISTWIDTH] IN BLOOD BY AUTOMATED COUNT: 13.7 % (ref 11.6–15.1)
EXT PREG TEST URINE: NEGATIVE
GFR SERPL CREATININE-BSD FRML MDRD: 83 ML/MIN/1.73SQ M
GLUCOSE SERPL-MCNC: 74 MG/DL (ref 65–140)
GLUCOSE UR STRIP-MCNC: NEGATIVE MG/DL
HCT VFR BLD AUTO: 40.5 % (ref 34.8–46.1)
HGB BLD-MCNC: 14.1 G/DL (ref 11.5–15.4)
HGB UR QL STRIP.AUTO: ABNORMAL
KETONES UR STRIP-MCNC: ABNORMAL MG/DL
LEUKOCYTE ESTERASE UR QL STRIP: NEGATIVE
LIPASE SERPL-CCNC: 161 U/L (ref 73–393)
LYMPHOCYTES # BLD AUTO: 2.17 THOUSANDS/ΜL (ref 0.6–4.47)
LYMPHOCYTES NFR BLD AUTO: 33 % (ref 14–44)
MCH RBC QN AUTO: 29.9 PG (ref 26.8–34.3)
MCHC RBC AUTO-ENTMCNC: 34.8 G/DL (ref 31.4–37.4)
MCV RBC AUTO: 86 FL (ref 82–98)
MONOCYTES # BLD AUTO: 0.34 THOUSAND/ΜL (ref 0.17–1.22)
MONOCYTES NFR BLD AUTO: 5 % (ref 4–12)
MUCOUS THREADS UR QL AUTO: ABNORMAL
NEUTROPHILS # BLD AUTO: 3.85 THOUSANDS/ΜL (ref 1.85–7.62)
NEUTS SEG NFR BLD AUTO: 60 % (ref 43–75)
NITRITE UR QL STRIP: NEGATIVE
NON-SQ EPI CELLS URNS QL MICRO: ABNORMAL /HPF
NRBC BLD AUTO-RTO: 0 /100 WBCS
PH UR STRIP.AUTO: 7 [PH] (ref 4.5–8)
PLATELET # BLD AUTO: 289 THOUSANDS/UL (ref 149–390)
PMV BLD AUTO: 9.9 FL (ref 8.9–12.7)
POTASSIUM SERPL-SCNC: 3.7 MMOL/L (ref 3.5–5.3)
PROT SERPL-MCNC: 7.6 G/DL (ref 6.4–8.2)
PROT UR STRIP-MCNC: ABNORMAL MG/DL
RBC # BLD AUTO: 4.72 MILLION/UL (ref 3.81–5.12)
RBC #/AREA URNS AUTO: ABNORMAL /HPF
SODIUM SERPL-SCNC: 139 MMOL/L (ref 136–145)
SP GR UR STRIP.AUTO: 1.02 (ref 1–1.03)
UROBILINOGEN UR QL STRIP.AUTO: 4 E.U./DL
WBC # BLD AUTO: 6.49 THOUSAND/UL (ref 4.31–10.16)
WBC #/AREA URNS AUTO: ABNORMAL /HPF

## 2017-12-01 PROCEDURE — 80053 COMPREHEN METABOLIC PANEL: CPT | Performed by: EMERGENCY MEDICINE

## 2017-12-01 PROCEDURE — 36415 COLL VENOUS BLD VENIPUNCTURE: CPT | Performed by: EMERGENCY MEDICINE

## 2017-12-01 PROCEDURE — 81001 URINALYSIS AUTO W/SCOPE: CPT

## 2017-12-01 PROCEDURE — 99284 EMERGENCY DEPT VISIT MOD MDM: CPT

## 2017-12-01 PROCEDURE — 81025 URINE PREGNANCY TEST: CPT | Performed by: EMERGENCY MEDICINE

## 2017-12-01 PROCEDURE — 96374 THER/PROPH/DIAG INJ IV PUSH: CPT

## 2017-12-01 PROCEDURE — 96375 TX/PRO/DX INJ NEW DRUG ADDON: CPT

## 2017-12-01 PROCEDURE — 83690 ASSAY OF LIPASE: CPT | Performed by: EMERGENCY MEDICINE

## 2017-12-01 PROCEDURE — 81002 URINALYSIS NONAUTO W/O SCOPE: CPT | Performed by: EMERGENCY MEDICINE

## 2017-12-01 PROCEDURE — 85025 COMPLETE CBC W/AUTO DIFF WBC: CPT | Performed by: EMERGENCY MEDICINE

## 2017-12-01 RX ORDER — MELATONIN
1000 DAILY
Status: DISCONTINUED | OUTPATIENT
Start: 2017-12-02 | End: 2017-12-02 | Stop reason: HOSPADM

## 2017-12-01 RX ORDER — DULOXETIN HYDROCHLORIDE 60 MG/1
60 CAPSULE, DELAYED RELEASE ORAL DAILY
Status: DISCONTINUED | OUTPATIENT
Start: 2017-12-02 | End: 2017-12-02 | Stop reason: HOSPADM

## 2017-12-01 RX ORDER — MOMETASONE FUROATE 1 MG/G
1 CREAM TOPICAL 2 TIMES DAILY
Status: DISCONTINUED | OUTPATIENT
Start: 2017-12-02 | End: 2017-12-02 | Stop reason: HOSPADM

## 2017-12-01 RX ORDER — ONDANSETRON 2 MG/ML
4 INJECTION INTRAMUSCULAR; INTRAVENOUS EVERY 6 HOURS PRN
Status: DISCONTINUED | OUTPATIENT
Start: 2017-12-01 | End: 2017-12-02 | Stop reason: HOSPADM

## 2017-12-01 RX ORDER — ALPRAZOLAM 0.5 MG/1
1 TABLET ORAL 3 TIMES DAILY PRN
Status: DISCONTINUED | OUTPATIENT
Start: 2017-12-01 | End: 2017-12-02

## 2017-12-01 RX ORDER — POLYETHYLENE GLYCOL 3350 17 G/17G
17 POWDER, FOR SOLUTION ORAL 2 TIMES DAILY
Status: DISCONTINUED | OUTPATIENT
Start: 2017-12-02 | End: 2017-12-02 | Stop reason: HOSPADM

## 2017-12-01 RX ORDER — PREGABALIN 100 MG/1
100 CAPSULE ORAL 2 TIMES DAILY
Status: DISCONTINUED | OUTPATIENT
Start: 2017-12-02 | End: 2017-12-02 | Stop reason: HOSPADM

## 2017-12-01 RX ORDER — OXYCODONE HYDROCHLORIDE 5 MG/1
5 TABLET ORAL EVERY 4 HOURS PRN
Status: DISCONTINUED | OUTPATIENT
Start: 2017-12-01 | End: 2017-12-02 | Stop reason: HOSPADM

## 2017-12-01 RX ORDER — PANTOPRAZOLE SODIUM 40 MG/1
40 TABLET, DELAYED RELEASE ORAL
Status: DISCONTINUED | OUTPATIENT
Start: 2017-12-02 | End: 2017-12-02 | Stop reason: HOSPADM

## 2017-12-01 RX ORDER — CYCLOBENZAPRINE HCL 10 MG
10 TABLET ORAL 3 TIMES DAILY PRN
Status: DISCONTINUED | OUTPATIENT
Start: 2017-12-01 | End: 2017-12-02

## 2017-12-01 RX ORDER — DIAZEPAM 5 MG/1
5 TABLET ORAL EVERY 8 HOURS PRN
Status: DISCONTINUED | OUTPATIENT
Start: 2017-12-01 | End: 2017-12-02

## 2017-12-01 RX ORDER — KETOROLAC TROMETHAMINE 30 MG/ML
15 INJECTION, SOLUTION INTRAMUSCULAR; INTRAVENOUS EVERY 6 HOURS PRN
Status: DISCONTINUED | OUTPATIENT
Start: 2017-12-01 | End: 2017-12-02

## 2017-12-01 RX ORDER — MAGNESIUM HYDROXIDE/ALUMINUM HYDROXICE/SIMETHICONE 120; 1200; 1200 MG/30ML; MG/30ML; MG/30ML
30 SUSPENSION ORAL ONCE
Status: COMPLETED | OUTPATIENT
Start: 2017-12-01 | End: 2017-12-01

## 2017-12-01 RX ORDER — ACETAMINOPHEN 325 MG/1
650 TABLET ORAL EVERY 6 HOURS PRN
Status: DISCONTINUED | OUTPATIENT
Start: 2017-12-01 | End: 2017-12-02 | Stop reason: HOSPADM

## 2017-12-01 RX ORDER — ONDANSETRON 2 MG/ML
4 INJECTION INTRAMUSCULAR; INTRAVENOUS ONCE
Status: COMPLETED | OUTPATIENT
Start: 2017-12-01 | End: 2017-12-01

## 2017-12-01 RX ORDER — SODIUM CHLORIDE 9 MG/ML
75 INJECTION, SOLUTION INTRAVENOUS CONTINUOUS
Status: DISCONTINUED | OUTPATIENT
Start: 2017-12-01 | End: 2017-12-02 | Stop reason: HOSPADM

## 2017-12-01 RX ORDER — CLONAZEPAM 0.5 MG/1
0.5 TABLET ORAL 2 TIMES DAILY PRN
Status: DISCONTINUED | OUTPATIENT
Start: 2017-12-01 | End: 2017-12-02 | Stop reason: HOSPADM

## 2017-12-01 RX ORDER — FOLIC ACID 1 MG/1
1 TABLET ORAL DAILY
Status: DISCONTINUED | OUTPATIENT
Start: 2017-12-02 | End: 2017-12-02 | Stop reason: HOSPADM

## 2017-12-01 RX ADMIN — ONDANSETRON 4 MG: 2 INJECTION INTRAMUSCULAR; INTRAVENOUS at 19:59

## 2017-12-01 RX ADMIN — KETOROLAC TROMETHAMINE 15 MG: 30 INJECTION, SOLUTION INTRAMUSCULAR at 23:47

## 2017-12-01 RX ADMIN — HYDROMORPHONE HYDROCHLORIDE 1 MG: 1 INJECTION, SOLUTION INTRAMUSCULAR; INTRAVENOUS; SUBCUTANEOUS at 21:35

## 2017-12-01 RX ADMIN — LIDOCAINE HYDROCHLORIDE 15 ML: 20 SOLUTION ORAL; TOPICAL at 19:58

## 2017-12-01 RX ADMIN — SODIUM CHLORIDE 75 ML/HR: 0.9 INJECTION, SOLUTION INTRAVENOUS at 23:47

## 2017-12-01 RX ADMIN — ALUMINUM HYDROXIDE, MAGNESIUM HYDROXIDE, AND SIMETHICONE 30 ML: 200; 200; 20 SUSPENSION ORAL at 19:58

## 2017-12-01 NOTE — DISCHARGE SUMMARY
Discharge Summary - [facility]    Patient Information: Dick To 32 y o  female MRN: [id number]  Unit/Bed#: -01 Encounter: [id number]    Discharging Physician / Practitioner: Lucio Forbes MD  PCP: Valentina Mast MD  Admission Date: 11/25/2017  Discharge Date: 11/30/17    Reason for Admission: abdominal pain    Discharge Diagnoses:     Principal Problem:    Epigastric pain  Active Problems:    Psoriatic arthritis ([address] )    Endometriosis    Fibromyalgia muscle pain    History of cholecystectomy  Resolved Problems:    * No resolved hospital problems  *      Consultations During Hospital Stay:  · Psychiatry  · Thoracic surgery  · Pain management  · Gastroenterology    Procedures Performed:     · Endoscopy which was remarkable for a sliding hiatus hernia  · Barium swallow was positive for a small hiatal hernia    Significant Findings / Test Results:     · Hypernatremia    Incidental Findings:   [id number] hepatic cyst on CT scan    Test Results Pending at Discharge (will require follow up): · Esophageal biopsy     Outpatient Tests Requested:  · Esophageal pH monitoring and manometry    Complications:  None    Hospital Course:     Dick To is a 32 y o  female patient who originally presented to the hospital on 11/25/2017 due to abdominal pain  CT scan was unremarkable  Barium swallow showed a mild hiatal hernia  She was seen by GI who performed an endoscopy with the above mentioned results, biopsies are pending  During her hospital course she complained of severe abdominal pain when swallowing despite the normal studies mentioned above  She was also seen in consultation by thoracic surgery and pain management  Tapering off opioids was recommended by both  Due to persistent symptoms she was arranged to have esophageal pH monitoring and manometry as an outpatient and counseled to follow up with GI to continue her workup      Condition at Discharge: stable     Discharge Day Visit / Exam: Subjective:  Epigastric pain when swallowing  Vitals: Blood Pressure: 105/69 (11/30/17 0817)  Pulse: 68 (11/30/17 0817)  Temperature: 98 6 °F (37 °C) (11/30/17 0817)  Temp Source: Oral (11/30/17 0817)  Respirations: 18 (11/30/17 0817)  Height: 5' 6" (167 6 cm) (11/25/17 1214)  Weight - Scale: 76 7 kg (169 lb) (11/25/17 1214)  SpO2: 98 % (11/30/17 0817)  Exam:   Physical Exam   Constitutional: She is oriented to person, place, and time  She appears well-developed and well-nourished  HENT:   Head: Normocephalic and atraumatic  Mouth/Throat: Oropharynx is clear and moist    Eyes: EOM are normal  Pupils are equal, round, and reactive to light  No scleral icterus  Neck: Neck supple  No JVD present  Abdominal: She exhibits no distension  Neurological: She is alert and oriented to person, place, and time  Skin: Skin is warm and dry  Discussion with Family: patient and significant other, regarding plan of care    Discharge instructions/Information to patient and family:   See after visit summary for information provided to patient and family  Provisions for Follow-Up Care:  See after visit summary for information related to follow-up care and any pertinent home health orders  Disposition:     Home    For Discharges to [facility]:   · Not Applicable to this Patient - Not Applicable to this Patient    Planned Readmission: None     Discharge Statement:  I spent 45 minutes discharging the patient  This time was spent on the day of discharge  I had direct contact with the patient on the day of discharge  Greater than 50% of the total time was spent examining patient, answering all patient questions, arranging and discussing plan of care with patient as well as directly providing post-discharge instructions  Additional time then spent on discharge activities  Discharge Medications:  See after visit summary for reconciled discharge medications provided to patient and family  ** Please Note: This note has been constructed using a voice recognition system **

## 2017-12-02 VITALS
SYSTOLIC BLOOD PRESSURE: 119 MMHG | OXYGEN SATURATION: 98 % | HEIGHT: 66 IN | TEMPERATURE: 98.6 F | HEART RATE: 74 BPM | RESPIRATION RATE: 18 BRPM | BODY MASS INDEX: 27.99 KG/M2 | WEIGHT: 174.16 LBS | DIASTOLIC BLOOD PRESSURE: 75 MMHG

## 2017-12-02 LAB
ANION GAP SERPL CALCULATED.3IONS-SCNC: 7 MMOL/L (ref 4–13)
BUN SERPL-MCNC: 11 MG/DL (ref 5–25)
CALCIUM SERPL-MCNC: 8.8 MG/DL (ref 8.3–10.1)
CHLORIDE SERPL-SCNC: 107 MMOL/L (ref 100–108)
CO2 SERPL-SCNC: 26 MMOL/L (ref 21–32)
CREAT SERPL-MCNC: 0.96 MG/DL (ref 0.6–1.3)
GFR SERPL CREATININE-BSD FRML MDRD: 81 ML/MIN/1.73SQ M
GLUCOSE SERPL-MCNC: 73 MG/DL (ref 65–140)
POTASSIUM SERPL-SCNC: 3.7 MMOL/L (ref 3.5–5.3)
SODIUM SERPL-SCNC: 140 MMOL/L (ref 136–145)

## 2017-12-02 PROCEDURE — 80048 BASIC METABOLIC PNL TOTAL CA: CPT | Performed by: HOSPITALIST

## 2017-12-02 RX ORDER — KETOROLAC TROMETHAMINE 30 MG/ML
30 INJECTION, SOLUTION INTRAMUSCULAR; INTRAVENOUS EVERY 6 HOURS PRN
Status: DISCONTINUED | OUTPATIENT
Start: 2017-12-02 | End: 2017-12-02 | Stop reason: HOSPADM

## 2017-12-02 RX ORDER — SUCRALFATE ORAL 1 G/10ML
1000 SUSPENSION ORAL EVERY 6 HOURS SCHEDULED
Status: DISCONTINUED | OUTPATIENT
Start: 2017-12-02 | End: 2017-12-02 | Stop reason: HOSPADM

## 2017-12-02 RX ORDER — SUCRALFATE ORAL 1 G/10ML
1000 SUSPENSION ORAL EVERY 6 HOURS SCHEDULED
Qty: 420 ML | Refills: 0 | Status: ON HOLD | OUTPATIENT
Start: 2017-12-02 | End: 2022-03-18 | Stop reason: ALTCHOICE

## 2017-12-02 RX ADMIN — DULOXETINE 60 MG: 60 CAPSULE, DELAYED RELEASE ORAL at 08:22

## 2017-12-02 RX ADMIN — KETOROLAC TROMETHAMINE 30 MG: 30 INJECTION, SOLUTION INTRAMUSCULAR at 05:40

## 2017-12-02 RX ADMIN — ALPRAZOLAM 1 MG: 0.5 TABLET ORAL at 00:39

## 2017-12-02 RX ADMIN — PREGABALIN 100 MG: 100 CAPSULE ORAL at 00:29

## 2017-12-02 RX ADMIN — FOLIC ACID 1 MG: 1 TABLET ORAL at 08:22

## 2017-12-02 RX ADMIN — POLYETHYLENE GLYCOL 3350 17 G: 17 POWDER, FOR SOLUTION ORAL at 08:22

## 2017-12-02 RX ADMIN — KETOROLAC TROMETHAMINE 30 MG: 30 INJECTION, SOLUTION INTRAMUSCULAR at 14:10

## 2017-12-02 RX ADMIN — MOMETASONE FUROATE 1 APPLICATION: 1 CREAM TOPICAL at 08:22

## 2017-12-02 RX ADMIN — ONDANSETRON 4 MG: 2 INJECTION INTRAMUSCULAR; INTRAVENOUS at 07:42

## 2017-12-02 RX ADMIN — OXYCODONE HYDROCHLORIDE 5 MG: 5 TABLET ORAL at 02:56

## 2017-12-02 RX ADMIN — PREGABALIN 100 MG: 100 CAPSULE ORAL at 08:22

## 2017-12-02 RX ADMIN — HYDROMORPHONE HYDROCHLORIDE 0.5 MG: 1 INJECTION, SOLUTION INTRAMUSCULAR; INTRAVENOUS; SUBCUTANEOUS at 11:49

## 2017-12-02 RX ADMIN — VITAMIN D, TAB 1000IU (100/BT) 1000 UNITS: 25 TAB at 08:22

## 2017-12-02 RX ADMIN — SODIUM CHLORIDE 75 ML/HR: 0.9 INJECTION, SOLUTION INTRAVENOUS at 11:49

## 2017-12-02 RX ADMIN — NORETHINDRONE ACETATE 5 MG: 5 TABLET ORAL at 00:29

## 2017-12-02 RX ADMIN — ONDANSETRON 4 MG: 2 INJECTION INTRAMUSCULAR; INTRAVENOUS at 00:38

## 2017-12-02 RX ADMIN — PANTOPRAZOLE SODIUM 40 MG: 40 TABLET, DELAYED RELEASE ORAL at 05:40

## 2017-12-02 RX ADMIN — ONDANSETRON 4 MG: 2 INJECTION INTRAMUSCULAR; INTRAVENOUS at 14:03

## 2017-12-02 RX ADMIN — OXYCODONE HYDROCHLORIDE 5 MG: 5 TABLET ORAL at 07:42

## 2017-12-02 NOTE — CASE MANAGEMENT
Initial Clinical Review      Given.to [facility] in the [facility] by [address] for 2017  Network Utilization Review Department  Phone: [phone]; Fax [phone]  ATTENTION: The Network Utilization Review Department is now centralized for our 7 Facilities  Make a note that we have a new phone and fax numbers for our Department  Please call with any questions or concerns to [phone] and carefully follow the prompts so that you are directed to the right person  All voicemails are confidential  Fax any determinations, approvals, denials, and requests for initial or continue stay review clinical to [phone]  Due to HIGH CALL volume, it would be easier if you could please send faxed requests to expedite your requests and in part, help us provide discharge notifications faster  Admission: Date/Time/Statement: 12/1/17 @ 2157 -- OBS    Orders Placed This Encounter   Procedures    Place in Observation (expected length of stay for this patient is less than two midnights)     Standing Status:   Standing     Number of Occurrences:   1     Order Specific Question:   Admitting Physician     Answer:   Olvin Hopkins     Order Specific Question:   Level of Care     Answer:   Med Surg [16]       ED: Date/Time/Mode of Arrival:   ED Arrival Information     Expected Arrival Acuity Means of Arrival Escorted By Service Admission Type    - 12/1/2017 17:07 Urgent Walk-In Self General Medicine Urgent    Arrival Complaint    Abdominal Pain          Chief Complaint:   Chief Complaint   Patient presents with    Epigastric Pain     discharged from hospital yesterday, with scheduled OP studies for Esophageal 24 hour PH studies, pain untolerable with roxicodone        History of Illness:  32 y o  female who presented again with intractable epigastric pain getting worse with food    Upon my assessment patient was complaining of epigastric pain and wanted to get IV Dilaudid or morphine, otherwise she cannot talk  I informed her that she will be managed according to adult pain management protocol  Patient wants to be seen by pain management specialist, thoracic surgeon and GI stating that  "I want to get an answer why I am in such severe pain"  I could not get a lot of information regarding her abdominal pain as patient mostly was talking about IV pain management  Of note she was discharged yesterday for outpatient esophageal manometry  24 hour pH study  Looks like it was normal   She had an extensive workup during her most recent admission on 11/25-11/30, seen by multiple specialists including thoracic surgeon for hiatal hernia, GI Pain Management and Psychiatry  According to psychiatry notes patient felt anxious because physician had not been able to find out what is wrong with her despite multiple treatments and tests  Endoscopy was remarkable only for a sliding hiatus hernia, barium swallow was positive for small hiatal   Initially patient was manage with IV opioid, opioids have been tapered off as recommended by thoracic surgeon and pain management        ED Vital Signs:   ED Triage Vitals   Temperature Pulse Respirations Blood Pressure SpO2   12/01/17 1758 12/01/17 1758 12/01/17 1758 12/01/17 1801 12/01/17 1758   98 6 °F (37 °C) 105 20 131/77 100 %      Temp Source Heart Rate Source Patient Position - Orthostatic VS BP Location FiO2 (%)   12/01/17 2339 12/01/17 1932 12/01/17 1932 12/01/17 1932 --   Oral Monitor Sitting Right arm       Pain Score       12/01/17 1758       8        Wt Readings from Last 1 Encounters:   12/01/17 79 kg (174 lb 2 6 oz)       Abnormal Labs/Diagnostic Test Results:  Tl bili 1 30    ED Treatment:   Medication Administration from 12/01/2017 1707 to 12/01/2017 2252       Date/Time Order Dose Route Action Action by Comments     12/01/2017 1959 ondansetron (ZOFRAN) injection 4 mg 4 mg Intravenous Given Ellen Hendrickson RN      12/01/2017 1958 lidocaine viscous (XYLOCAINE) 2 % mucosal solution 15 mL 15 mL Swish & Spit Given Michael Patrick RN      12/01/2017 1958 aluminum-magnesium hydroxide-simethicone (MYLANTA) 200-200-20 mg/5 mL oral suspension 30 mL 30 mL Oral Given Michael Patrick RN      12/01/2017 2135 HYDROmorphone (DILAUDID) 1 mg/mL injection 1 mg 1 mg Intravenous Given Michael Patrick RN           Past Medical/Surgical History:   Past Medical History:   Diagnosis Date    Abdominal discomfort     Anxiety     Arthritis     Endometritis     Fibromyalgia     GERD (gastroesophageal reflux disease)     Psoriatic arthritis (HCC)     Shortness of breath        Admitting Diagnosis: Epigastric pain [R10 13]  Intractable abdominal pain [R10 9]    Age/Sex: 32 y o  female    Assessment/Plan:   Hospital Problem List:      Principal Problem:    Intractable abdominal pain  Active Problems:    Psoriatic arthritis ([address] )    Endometriosis    Fibromyalgia muscle pain    History of cholecystectomy    Drug seeking behavior     Plan for the Primary Problem(s):  · Intractable abdominal pain unclear etiology patient has a history of small hiatus hernia  ? Clear liquid diet   ? GI bleed consult placed  ? Adult pain management protocol  ? Patient exhibited feature of drug-seeking behavior requested IV Dilaudid and morphine   Keeps saying that she should not be  discharged cause her pain has never been controlled with p o  meds      Plan for Additional Problems:   · Psoriatic arthritis continue methotrexate  · Endometriosis, fibromyalgia, continue appropriate home meds     VTE Prophylaxis: Pharmacologic VTE Prophylaxis contraindicated due to Low risk  / sequential compression device   Code Status: full  POLST: There is no POLST form on file for this patient (pre-hospital)     Anticipated Length of Stay:  Patient will be admitted on an Observation basis with an anticipated length of stay of  < 2 midnights     Justification for Hospital Stay: GI consult       Admission Orders:  M/S unit  Clear liquid diet  Consult GI  Up & oob as tolerated      Scheduled Meds:   cholecalciferol 1,000 Units Oral Daily   DULoxetine 60 mg Oral Daily   folic acid 1 mg Oral Daily   [START ON 12/6/2017] methotrexate 2 5 mg Oral Once per day on Wed   mometasone 1 application Topical BID   norethindrone 5 mg Oral Daily   pantoprazole 40 mg Oral Early Morning   polyethylene glycol 17 g Oral BID   pregabalin 100 mg Oral BID     Continuous Infusions:   sodium chloride 75 mL/hr Last Rate: 75 mL/hr (12/02/17 1149)     PRN Meds:   acetaminophen    ALPRAZolam    clonazePAM    cyclobenzaprine    diazepam    ketorolac    ondansetron    oxyCODONE

## 2017-12-02 NOTE — ED ATTENDING ATTESTATION
Laura Paredes MD, saw and evaluated the patient  I have discussed the patient with the resident/non-physician practitioner and agree with the resident's/non-physician practitioner's findings, Plan of Care, and MDM as documented in the resident's/non-physician practitioner's note, except where noted  All available labs and Radiology studies were reviewed  At this point I agree with the current assessment done in the Emergency Department  I have conducted an independent evaluation of this patient a history and physical is as follows:   Pt was admitted to hospital for 2 weeks of midepigastric abd pain  Pt was found to have a sliding hiatal hernia Pt had scope with neg biopsy  Pt was discharged yesterday for outpt gastric Ph  She was told by [facility] to return if pain persisted Pt states nothing has changed   Pain was continuous in hospital OF note pt adilene she was seen a year ago at [facility] for adb pain in which she had stents placed and feeding tube she spent 13 days in pain mgt in hospital and stents were removed Then pain got better and nwo recurrence of same pain higher in abd and chest PE; alert tender midepigastric area heart reg lungs clear ext nad MDM: will treat pain discuss with medicine    Critical Care Time  CritCare Time

## 2017-12-02 NOTE — NURSING NOTE
Patient discharged to home, left facility via w/c in stable condition with volunteer escort services  Personal belongings went with patient  Discharge instructions and scripts provided and reviewed with patient  Left anticubital IV site removed, tolerated well

## 2017-12-02 NOTE — ED PROVIDER NOTES
History  Chief Complaint   Patient presents with    Epigastric Pain     discharged from hospital yesterday, with scheduled OP studies for Esophageal 24 hour PH studies, pain untolerable with roxicodone      49-year-old female history of cholecystectomy and recent admission to the hospital for epigastric pain comes emergency department for continuing epigastric pain  Patient through her admission had multiple labs that were normal  Patient had a CT scan of her chest and abdomen and showed a sliding hiatal hernia  Patient was told by her doctor that she was going to be discharged for outpatient esophageal pH monitoring and was told to come back if she continued to have pain  Patient states that she has been taking oxycodone without any relief of her epigastric pain  Patient describes her pain as sharp and made worse with food and feels bloated  Patient also states that she finds it hard to initiate urination as well  Otherwise, patient denies any fever, chest pain, shortness of breath, vomiting, dysuria, constipation or diarrhea  Medical management decision making:  Patient presenting with continuing epigastric pain I will get a CBC CMP and lipase to rule out leukocytosis pancreatitis and other gastroenterological causes I will get a urinalysis and pregnancy to rule UTI in pregnancy I will give patient a GI cocktail and 1 mg Dilaudid for symptom relief  Prior to Admission Medications   Prescriptions Last Dose Informant Patient Reported? Taking?    ALPRAZolam (XANAX) 1 mg tablet 12/1/2017 at Unknown time  Yes Yes   Sig: Take 1 mg by mouth 3 (three) times a day as needed for anxiety   DULoxetine (CYMBALTA) 60 mg delayed release capsule 12/1/2017 at Unknown time  Yes Yes   Sig: Take 60 mg by mouth daily   cholecalciferol (VITAMIN D3) 1,000 units tablet 12/1/2017 at Unknown time  Yes Yes   Sig: Take 1,000 Units by mouth daily   clonazePAM (KlonoPIN) 0 5 mg tablet 12/1/2017 at Unknown time  Yes Yes   Sig: Take 0 5 mg by mouth 2 (two) times a day as needed for seizures   cyclobenzaprine (FLEXERIL) 5 mg tablet 2017 at Unknown time  Yes Yes   Sig: Take 10 mg by mouth 3 (three) times a day as needed for muscle spasms   diazepam (VALIUM) 5 mg tablet 2017 at Unknown time  Yes Yes   Sig: Take 5 mg by mouth every 8 (eight) hours as needed for anxiety   folic acid (FOLVITE) 1 mg tablet 2017 at Unknown time  Yes Yes   Sig: Take 1 mg by mouth daily   methotrexate 2 5 mg tablet 2017 at Unknown time  Yes Yes   Sig: Take 2 5 mg by mouth once a week   mometasone (ELOCON) 0 1 % cream 2017 at Unknown time  Yes Yes   Sig: Apply 1 application topically 2 (two) times a day   norethindrone (AYGESTIN) 5 mg tablet 2017 at Unknown time  Yes Yes   Sig: Take 5 mg by mouth daily   omeprazole (PriLOSEC) 40 MG capsule 2017 at Unknown time  Yes Yes   Sig: Take 40 mg by mouth daily   oxyCODONE (ROXICODONE) 5 mg/5 mL solution 2017 at Unknown time  No Yes   Siml PO Q8h - then 5ml PO Q12H 12/3-, then 5ml PO QD -   polyethylene glycol (MIRALAX) 17 g packet 2017 at Unknown time  No Yes   Sig: Take 17 g by mouth 2 (two) times a day   pregabalin (LYRICA) 100 mg capsule 2017 at Unknown time  Yes Yes   Sig: Take 100 mg by mouth 2 (two) times a day      Facility-Administered Medications: None       Past Medical History:   Diagnosis Date    Abdominal discomfort     Anxiety     Arthritis     Endometritis     Fibromyalgia     GERD (gastroesophageal reflux disease)     Psoriatic arthritis (HCC)     Shortness of breath     exacerbated with rib pain       Past Surgical History:   Procedure Laterality Date    ABDOMINAL SURGERY      CHOLECYSTECTOMY      EGD AND COLONOSCOPY      ESOPHAGOGASTRODUODENOSCOPY N/A 2017    Procedure: ESOPHAGOGASTRODUODENOSCOPY (EGD); Surgeon: Mansi Hernandez MD;  Location: [facility];   Service: Gastroenterology    LAPAROSCOPY      for endometriosis History reviewed  No pertinent family history  I have reviewed and agree with the history as documented  Social History   Substance Use Topics    Smoking status: Never Smoker    Smokeless tobacco: Never Used    Alcohol use No        Review of Systems   Constitutional: Negative for appetite change, chills, diaphoresis, fatigue and fever  HENT: Negative for congestion, ear discharge, ear pain, hearing loss, postnasal drip, rhinorrhea, sneezing and sore throat  Eyes: Negative for pain, discharge and redness  Respiratory: Negative for choking, chest tightness, shortness of breath, wheezing and stridor  Cardiovascular: Negative for chest pain and palpitations  Gastrointestinal: Positive for abdominal pain and nausea  Negative for abdominal distention, blood in stool, constipation, diarrhea and vomiting  Genitourinary: Negative for decreased urine volume, difficulty urinating, dysuria, flank pain, frequency and hematuria  Musculoskeletal: Negative for arthralgias, gait problem, joint swelling and neck pain  Skin: Negative for color change, pallor and rash  Allergic/Immunologic: Negative for environmental allergies, food allergies and immunocompromised state  Neurological: Negative for dizziness, seizures, weakness, light-headedness, numbness and headaches  Hematological: Negative for adenopathy  Does not bruise/bleed easily  Psychiatric/Behavioral: Negative for agitation and behavioral problems         Physical Exam  ED Triage Vitals   Temperature Pulse Respirations Blood Pressure SpO2   12/01/17 1758 12/01/17 1758 12/01/17 1758 12/01/17 1801 12/01/17 1758   98 6 °F (37 °C) 105 20 131/77 100 %      Temp Source Heart Rate Source Patient Position - Orthostatic VS BP Location FiO2 (%)   12/01/17 2339 12/01/17 1932 12/01/17 1932 12/01/17 1932 --   Oral Monitor Sitting Right arm       Pain Score       12/01/17 1758       8           Orthostatic Vital Signs  Vitals:    12/01/17 2339 12/02/17 0713 12/02/17 1149 12/02/17 1527   BP: 110/72 101/68 116/72 119/75   Pulse: 99 65  74   Patient Position - Orthostatic VS: Lying Lying  Sitting       Physical Exam   Constitutional: She is oriented to person, place, and time  She appears well-developed and well-nourished  HENT:   Head: Normocephalic and atraumatic  Nose: Nose normal    Mouth/Throat: Oropharynx is clear and moist    Eyes: Conjunctivae and EOM are normal  Pupils are equal, round, and reactive to light  Neck: Normal range of motion  Neck supple  Cardiovascular: Normal rate, regular rhythm and normal heart sounds  Exam reveals no gallop and no friction rub  No murmur heard  Pulmonary/Chest: Effort normal and breath sounds normal    Abdominal: Soft  Bowel sounds are normal  She exhibits no distension  There is tenderness  There is no rebound and no guarding  Musculoskeletal: Normal range of motion  Neurological: She is alert and oriented to person, place, and time  She has normal reflexes  Skin: Skin is warm and dry  No erythema  No pallor  Psychiatric: She has a normal mood and affect  Her behavior is normal    Nursing note and vitals reviewed        ED Medications  Medications   ondansetron (ZOFRAN) injection 4 mg (4 mg Intravenous Given 12/1/17 1959)   lidocaine viscous (XYLOCAINE) 2 % mucosal solution 15 mL (15 mL Swish & Spit Given 12/1/17 1958)   aluminum-magnesium hydroxide-simethicone (MYLANTA) 200-200-20 mg/5 mL oral suspension 30 mL (30 mL Oral Given 12/1/17 1958)   HYDROmorphone (DILAUDID) 1 mg/mL injection 1 mg (1 mg Intravenous Given 12/1/17 2135)   HYDROmorphone (DILAUDID) 1 mg/mL injection 0 5 mg (0 5 mg Intravenous Given 12/2/17 1149)       Diagnostic Studies  Results Reviewed     Procedure Component Value Units Date/Time    Urine Microscopic [[id number]]  (Abnormal) Collected:  12/01/17 2129    Lab Status:  Final result Specimen:  Urine from Urine, Clean Catch Updated:  12/01/17 2222     RBC, UA 2-4 (A) /hpf WBC, UA 2-4 (A) /hpf      Epithelial Cells Moderate (A) /hpf      Bacteria, UA None Seen /hpf      MUCOUS THREADS Moderate    POCT pregnancy, urine [[id number]]  (Normal) Resulted:  12/01/17 2131    Lab Status:  Final result Updated:  12/01/17 2131     EXT PREG TEST UR (Ref: Negative) negative    POCT urinalysis dipstick [[id number]]  (Normal) Resulted:  12/01/17 2131    Lab Status:  Final result Specimen:  Urine Updated:  12/01/17 2131     Color, UA yellow    ED Urine Macroscopic [[id number]]  (Abnormal) Collected:  12/01/17 2129    Lab Status:  Final result Specimen:  Urine Updated:  12/01/17 2130     Color, UA Yellow     Clarity, UA Clear     pH, UA 7 0     Leukocytes, UA Negative     Nitrite, UA Negative     Protein, UA 30 (1+) (A) mg/dl      Glucose, UA Negative mg/dl      Ketones, UA 40 (2+) (A) mg/dl      Urobilinogen, UA 4 0 (A) E U /dl      Bilirubin, UA Interference- unable to analyze (A)     Blood, UA Trace (A)     Specific [place], UA 1 025    Narrative:       CLINITEK RESULT    Comprehensive metabolic panel [[id number]]  (Abnormal) Collected:  12/01/17 2001    Lab Status:  Final result Specimen:  Blood from Arm, Left Updated:  12/01/17 2049     Sodium 139 mmol/L      Potassium 3 7 mmol/L      Chloride 105 mmol/L      CO2 27 mmol/L      Anion Gap 7 mmol/L      BUN 8 mg/dL      Creatinine 0 94 mg/dL      Glucose 74 mg/dL      Calcium 9 6 mg/dL      AST 13 U/L      ALT 29 U/L      Alkaline Phosphatase 59 U/L      Total Protein 7 6 g/dL      Albumin 3 9 g/dL      Total Bilirubin 1 30 (H) mg/dL      eGFR 83 ml/min/1 73sq m     Narrative:         National Kidney Disease Education Program recommendations are as follows:  GFR calculation is accurate only with a steady state creatinine  Chronic Kidney disease less than 60 ml/min/1 73 sq  meters  Kidney failure less than 15 ml/min/1 73 sq  meters      Lipase [[id number]]  (Normal) Collected:  12/01/17 2001    Lab Status:  Final result Specimen:  Blood from Arm, Left Updated:  12/01/17 2049     Lipase 161 u/L     CBC and differential [[id number]]  (Normal) Collected:  12/01/17 2001    Lab Status:  Final result Specimen:  Blood from Arm, Left Updated:  12/01/17 2014     WBC 6 49 Thousand/uL      RBC 4 72 Million/uL      Hemoglobin 14 1 g/dL      Hematocrit 40 5 %      MCV 86 fL      MCH 29 9 pg      MCHC 34 8 g/dL      RDW 13 7 %      MPV 9 9 fL      Platelets 191 Thousands/uL      nRBC 0 /100 WBCs      Neutrophils Relative 60 %      Lymphocytes Relative 33 %      Monocytes Relative 5 %      Eosinophils Relative 2 %      Basophils Relative 0 %      Neutrophils Absolute 3 85 Thousands/µL      Lymphocytes Absolute 2 17 Thousands/µL      Monocytes Absolute 0 34 Thousand/µL      Eosinophils Absolute 0 10 Thousand/µL      Basophils Absolute 0 02 Thousands/µL                  No orders to display         Procedures  Procedures      Phone Consults  ED Phone Contact    ED Course  ED Course                                MDM  CritCare Time    Disposition  Final diagnoses:   Intractable abdominal pain     Time reflects when diagnosis was documented in both MDM as applicable and the Disposition within this note     Time User Action Codes Description Comment    12/1/2017  9:57 PM Kathia Batres Add [R10 9] Intractable abdominal pain       ED Disposition     ED Disposition Condition Comment    Admit  Case was discussed with Dr Trudy Garrido and the patient's admission status was agreed to be Admission Status: observation status to the service of Dr Trudy Garrido           Follow-up Information     Follow up With Specialties Details Why Elliott Casarez MD  Follow up in 1 week(s)  [address],  Gastroenterology Follow up Please call and schedule an appointment to be seen in the next 1-2 weeks [address] [facility] [address] [place] [address]  [phone]          Discharge Medication List as of 12/2/2017  4:40 PM START taking these medications    Details   sucralfate (CARAFATE) 1 g/10 mL suspension Take 10 mL by mouth every 6 (six) hours for 14 days, Starting Sat 12/2/2017, Until Sat 12/16/2017, Print         CONTINUE these medications which have NOT CHANGED    Details   cholecalciferol (VITAMIN D3) 1,000 units tablet Take 1,000 Units by mouth daily, Historical Med      clonazePAM (KlonoPIN) 0 5 mg tablet Take 0 5 mg by mouth 2 (two) times a day as needed for seizures, Historical Med      DULoxetine (CYMBALTA) 60 mg delayed release capsule Take 60 mg by mouth daily, Historical Med      folic acid (FOLVITE) 1 mg tablet Take 1 mg by mouth daily, Historical Med      methotrexate 2 5 mg tablet Take 2 5 mg by mouth once a week, Historical Med      mometasone (ELOCON) 0 1 % cream Apply 1 application topically 2 (two) times a day, Historical Med      norethindrone (AYGESTIN) 5 mg tablet Take 5 mg by mouth daily, Historical Med      omeprazole (PriLOSEC) 40 MG capsule Take 40 mg by mouth daily, Historical Med      oxyCODONE (ROXICODONE) 5 mg/5 mL solution 5ml PO Q8h 11/30-12/2 then 5ml PO Q12H 12/3-12/5, then 5ml PO QD 12/6-12/8, Print      polyethylene glycol (MIRALAX) 17 g packet Take 17 g by mouth 2 (two) times a day, Starting u 11/30/2017, Normal      pregabalin (LYRICA) 100 mg capsule Take 100 mg by mouth 2 (two) times a day, Historical Med         STOP taking these medications       ALPRAZolam (XANAX) 1 mg tablet Comments:   Reason for Stopping:         cyclobenzaprine (FLEXERIL) 5 mg tablet Comments:   Reason for Stopping:         diazepam (VALIUM) 5 mg tablet Comments:   Reason for Stopping:               Outpatient Discharge Orders  Discharge Diet     Activity as tolerated     Call provider for:  persistent nausea or vomiting     Call provider for:  severe uncontrolled pain     Call provider for:  redness, tenderness, or signs of infection (pain, swelling, redness, odor or green/yellow discharge around incision site) Call provider for: active or persistent bleeding     Call provider for:  difficulty breathing, headache or visual disturbances     Call provider for:  hives     Call provider for:  persistent dizziness or light-headedness     Call provider for:  extreme fatigue         ED Provider  Attending physically available and evaluated Brennon Malik I managed the patient along with the ED Attending      Electronically Signed by         Olayinka Rachel MD  Resident  12/03/17 0148

## 2017-12-02 NOTE — PROGRESS NOTES
Pt c/o pain and toradol 15mg was given IV  Pt stated that she does not like the dilaudid medication, even though it did help control her pain  Pt continued to c/o pain about an hour later  Zofran and xanax were given  No further complaints at this time  Will continue to monitor

## 2017-12-02 NOTE — DISCHARGE SUMMARY
Discharge Summary - [facility] Medicine    Patient Information: Brennon Malik 32 y o  female MRN: [id number]  Unit/Bed#: -01 Encounter: [id number]    Discharging Physician / Practitioner: RICARDO Mustafa  PCP: Aldo Alves MD  Admission Date: 12/1/2017  Discharge Date: 12/02/17    Reason for Admission:  Intractable abdominal pain    Discharge Diagnoses:     Principal Problem:    Intractable abdominal pain  Active Problems:    Psoriatic arthritis ([address] )    Endometriosis    Fibromyalgia muscle pain    History of cholecystectomy    Drug-seeking behavior  Resolved Problems:    * No resolved hospital problems  *      Consultations During Hospital Stay:  · Gastroenterology    Procedures Performed:     · none    Significant Findings:     · none    Incidental Findings:   · none    Test Results Pending at Discharge (will require follow up):   · none     Outpatient Tests Requested:  none    Complications:  none    Hospital Course:     Brennon Malik is a 32 y o  female patient who originally presented to the hospital on 12/1/2017 who presented with intractable epigastric pain getting worse with food  Of note she was discharged yesterday for outpatient esophageal mammogram 24 hour pH study, which was normal   She had extensive workup during her most recent admission on 11/25/2011 and seen by multiple specialist including thoracic surgeon for hiatal hernia, GI pain management and psychiatry  Endoscopy was unremarkable only for a sliding hiatus hernia and barium swallow was positive for small hiatal hernia  GI was consulted and feels they do not think they will be able to find the cause of her pain during this hospitalization, she was given a prescription for Carafate  They do not recommend to feeding given her lack of malnutrition  She can complete her workup including a porphyria workup on an outpatient basis      Condition at Discharge: stable     Discharge Day Visit / Exam:     Subjective:  She is observed lying in bed stating she was unable to tolerate her clear liquids and they caused her moderate epigastric discomfort with nausea and severe belching  Vitals: Blood Pressure: 119/75 (12/02/17 1527)  Pulse: 74 (12/02/17 1527)  Temperature: 98 6 °F (37 °C) (12/02/17 1527)  Temp Source: Oral (12/02/17 1527)  Respirations: 18 (12/02/17 1527)  Height: 5' 6" (167 6 cm) (12/01/17 2345)  Weight - Scale: 79 kg (174 lb 2 6 oz) (12/01/17 2341)  SpO2: 98 % (12/02/17 1527)  Exam:   Physical Exam   Constitutional: She is oriented to person, place, and time  She appears well-developed and well-nourished  No distress  HENT:   Head: Normocephalic and atraumatic  Mouth/Throat: Oropharynx is clear and moist    Neck: Normal range of motion  Neck supple  Cardiovascular: Normal rate, regular rhythm and normal heart sounds  Exam reveals no gallop and no friction rub  No murmur heard  Pulmonary/Chest: Effort normal and breath sounds normal  No respiratory distress  She has no wheezes  She has no rales  She exhibits no tenderness  Abdominal: Soft  Bowel sounds are normal  She exhibits no distension and no mass  There is tenderness  There is no rebound and no guarding  Tenderness noted in the epigastric area   Musculoskeletal: Normal range of motion  She exhibits no edema, tenderness or deformity  Neurological: She is alert and oriented to person, place, and time  Skin: Skin is warm and dry  No rash noted  She is not diaphoretic  No erythema  No pallor  Psychiatric: She has a normal mood and affect  Her behavior is normal  Judgment and thought content normal        Discharge instructions/Information to patient and family:   See after visit summary for information provided to patient and family  Provisions for Follow-Up Care:  See after visit summary for information related to follow-up care and any pertinent home health orders        Disposition:     Home    For Discharges to   [address] SNF: · Not Applicable to this Patient - Not Applicable to this Patient    Planned Readmission:  Not anticipated     Discharge Statement:  I spent 35 minutes discharging the patient  This time was spent on the day of discharge  I had direct contact with the patient on the day of discharge  Greater than 50% of the total time was spent examining patient, answering all patient questions, arranging and discussing plan of care with patient as well as directly providing post-discharge instructions  Additional time then spent on discharge activities  Discharge Medications:  See after visit summary for reconciled discharge medications provided to patient and family  ** Please Note: Dragon 360 Dictation voice to text software may have been used in the creation of this document   **

## 2017-12-02 NOTE — PROGRESS NOTES
Patient rang call bell to tell nurse that she tried to eat some of her lunch (jello and broth) to show the docs that she was "trying to do as they say and to show them that it really hurts" when she eats  Patient in tears after eating maybe a spoonful of jello  Patient requested emesis basin just in case  Explained to patient that pain medication (oxycodone) and nausea medications were not due for a period of time and that as soon as they could be given they would be  Multiple other staff members mentioned to nurse that patient was sobbing in room from pain  Nurse spoke with Esther Watts NP about situation, and it was decided that a one time dose of 0 5 dilaudid would be given to alleviate symptoms  Patient agreeable and more comfortable following administration of pain medication  Will continue to monitor

## 2017-12-02 NOTE — CONSULTS
Consultation -  [facility]  Ryan Singer 32 y o  female MRN: [id number]  Unit/Bed#: -01 Encounter: [id number]        Consults    Reason for Consult / Principal Problem:  Abdominal pain    HPI: Ms Tiago Pitts is a 32 female with a history of chronic abdominal pain, psoriatic arthritis, and fibromyalgia  She was admitted on 12/01/2017 to [facility] with complaints of abdominal pain  The patient has undergone a very extensive workup at [address], [facility], and [facility]  She has undergone multiple imaging studies including EGD, colonoscopy, ERCP with sphincterotomy (for sphincter of Oddi dysfunction), a cholecystectomy (for chronic cholecystitis), a barium swallow, the gastric emptying scan, an MRCP, multiple CTs any abdominal pelvis, and exploratory laparotomy  Despite multiple evaluations at different network systems the patient continues to have intermittent severe abdominal pain      The patient states her abdominal pain is located in her epigastrium  She does have associated nausea and severe belching    She notes she had such severe pain she is unable to tolerate food and liquid  However, she notes that she has not had any substantial weight loss and actually feels that she has weight gain  She feels that her symptoms are somewhat related to a hiatal hernia that was diagnosed on several imaging studies  She has attempted to undergo a GI outpatient workup including a pH probe and esophageal manometry  She was unable to tolerate both these procedures  The patient in the past had been trialed on Bentyl, Carafate suspension, Reglan, proton pump inhibitors, and MiraLax  She feels that none of the above medications have alleviated any of her symptoms  The patient is extremely frustrated and feels that everyone is, missing the diagnosis    The patient otherwise notes that she is afraid to discontinue her pain medication due to her psoriatic arthritis and diagnosis of pelvic floor dysfunction  She has concerned that if she is discharged from the hospital she will not be able to a diet at home  She even willing to, consider and feeding tube to help alleviate her symptoms      To summarize the patient has recently undergone the listed several studies:    1  EGD on November 28, 2017-small 1 centimeter hiatal hernia otherwise normal esophagus, stomach, and duodenum  Random biopsies of the esophagus indicated less than 2 eosinophils per high-power field  No glandular mucosa or signs of dysplasia or malignancy  Random gastric biopsies indicated mild chronic inactive gastritis  Negative for H pylori  No signs of malignancy, dysplasia, or metaplasia  Random small-bowel biopsies indicated benign mucosa  No evidence of malabsorption enteropathy or peptic dysplasia  No evidence of malignancy  2   Barium swallow on November 29, 2017-sliding hiatal hernia  No reflux noted  3   MRCP in March of 2017-hypodensities in the liver likely representing cyst or hemangioma  Status post cholecystectomy  Otherwise unremarkable  4 Gastric emptying scan in May of 2016-significant gastroparesis however the patient was on narcotics at the time of testing  5   Colonoscopy on April 11, 2016-normal appearing colonoscopy and terminal ileum without evidence of colitis  Random biopsies of the ileum showed benign intestinal mucosa with no specific pathological changes  Microscopic features of typical celiac disease are not seen  No active inflammatory infiltrates or ulcerations identified  Random colon biopsy indicated benign:  Rectal mucosa with no specific pathological changes  Microscopic features lymphocytic colitis or collagenous colitis are not identified  No active inflammatory infiltrates or ulceration seen      6   ERCP on [address] [place] [address] on April 20, 2016-cannulation of the major papilla the bile duct was unsuccessful  There is significant edema at the ampulla precluding adequate visualization  Per Heriberto Chavez the pancreatic duct was entered heads a 5 English 3 centimeter pancreatic duct stent was placed  A partial pancratogram was normal   Cholangiogram was normal     7   Laboratory results from [address] on April 25, 2016-compliment C4 fractionated value 26; C1 Q binding immune complex 4 4      8   Exploratory laparotomy [facility] system reported by the patient in 2017 indicating endometrosis  REVIEW OF SYSTEMS:     CONSTITUTIONAL: Denies any fever, chills, or rigors  Poor appetite, and reported weight gain  HEENT: No earache or tinnitus  Denies hearing loss or visual disturbances  CARDIOVASCULAR:  Intermittent chest pain  RESPIRATORY:  Intermittent shortness of breath  GASTROINTESTINAL: As noted in the History of Present Illness  GENITOURINARY: No problems with urination  Denies any hematuria or dysuria  NEUROLOGIC: No dizziness or vertigo, denies headaches  MUSCULOSKELETAL:  Positive for joint pain  SKIN: Denies skin rashes or itching  ENDOCRINE: Denies excessive thirst  Denies intolerance to heat or cold  PSYCHOSOCIAL: Denies depression or anxiety  Denies any recent memory loss  Historical Information   Past Medical History:   Diagnosis Date    Abdominal discomfort     Anxiety     Arthritis     Endometritis     Fibromyalgia     GERD (gastroesophageal reflux disease)     Psoriatic arthritis (HCC)     Shortness of breath     exacerbated with rib pain     Past Surgical History:   Procedure Laterality Date    ABDOMINAL SURGERY      CHOLECYSTECTOMY      EGD AND COLONOSCOPY      ESOPHAGOGASTRODUODENOSCOPY N/A 11/28/2017    Procedure: ESOPHAGOGASTRODUODENOSCOPY (EGD); Surgeon: Jessica Solorzano MD;  Location: [facility];   Service: Gastroenterology    LAPAROSCOPY      for endometriosis     Social History   History Alcohol Use No     History   Drug Use No     History   Smoking Status    Never Smoker   Smokeless Tobacco    Never Used     History reviewed  No pertinent family history      Meds/Allergies     Prescriptions Prior to Admission   Medication    ALPRAZolam (XANAX) 1 mg tablet    cholecalciferol (VITAMIN D3) 1,000 units tablet    clonazePAM (KlonoPIN) 0 5 mg tablet    cyclobenzaprine (FLEXERIL) 5 mg tablet    diazepam (VALIUM) 5 mg tablet    DULoxetine (CYMBALTA) 60 mg delayed release capsule    folic acid (FOLVITE) 1 mg tablet    methotrexate 2 5 mg tablet    mometasone (ELOCON) 0 1 % cream    norethindrone (AYGESTIN) 5 mg tablet    omeprazole (PriLOSEC) 40 MG capsule    oxyCODONE (ROXICODONE) 5 mg/5 mL solution    polyethylene glycol (MIRALAX) 17 g packet    pregabalin (LYRICA) 100 mg capsule     Current Facility-Administered Medications   Medication Dose Route Frequency    acetaminophen (TYLENOL) tablet 650 mg  650 mg Oral Q6H PRN    cholecalciferol (VITAMIN D3) tablet 1,000 Units  1,000 Units Oral Daily    clonazePAM (KlonoPIN) tablet 0 5 mg  0 5 mg Oral BID PRN    DULoxetine (CYMBALTA) delayed release capsule 60 mg  60 mg Oral Daily    folic acid (FOLVITE) tablet 1 mg  1 mg Oral Daily    ketorolac (TORADOL) 30 mg/mL injection 30 mg  30 mg Intravenous Q6H PRN    [START ON 12/6/2017] methotrexate tablet 2 5 mg  2 5 mg Oral Once per day on Wed    mometasone (ELOCON) 0 1 % cream 1 application  1 application Topical BID    norethindrone (AYGESTIN) tablet 5 mg  5 mg Oral Daily    ondansetron (ZOFRAN) injection 4 mg  4 mg Intravenous Q6H PRN    oxyCODONE (ROXICODONE) IR tablet 5 mg  5 mg Oral Q4H PRN    pantoprazole (PROTONIX) EC tablet 40 mg  40 mg Oral Early Morning    polyethylene glycol (MIRALAX) packet 17 g  17 g Oral BID    pregabalin (LYRICA) capsule 100 mg  100 mg Oral BID    sodium chloride 0 9 % infusion  75 mL/hr Intravenous Continuous       No Known Allergies        Objective Blood pressure 116/72, pulse 65, temperature 97 7 °F (36 5 °C), temperature source Oral, resp  rate 18, height 5' 6" (1 676 m), weight 79 kg (174 lb 2 6 oz), SpO2 99 %  Intake/Output Summary (Last 24 hours) at 12/02/17 1329  Last data filed at 12/02/17 1225   Gross per 24 hour   Intake              580 ml   Output                0 ml   Net              580 ml         PHYSICAL EXAM:      General Appearance:   Alert, cooperative, no distress, appears stated age     HEENT:   Normocephalic, atraumatic, anicteric   Moist mucous membranes  Neck:  Supple, symmetrical, trachea midline    Lungs:   Clear to auscultation bilaterally; no rales, rhonchi or wheezing; respirations unlabored    Heart[de-identified]   S1 and S2 normal; regular rate and rhythm; no murmur, rub, or gallop  Abdomen:   Soft, diffusely tender to palpation    No acute abdominal masses noted      Genitalia:   Deferred    Rectal:   Deferred    Extremities:  No cyanosis, clubbing or edema    Pulses:  Not performed    Skin:  Skin color, texture, turgor normal, no rashes or lesions    Lymph nodes:  Not performed        Lab Results:   Admission on 12/01/2017   Component Date Value    WBC 12/01/2017 6 49     RBC 12/01/2017 4 72     Hemoglobin 12/01/2017 14 1     Hematocrit 12/01/2017 40 5     MCV 12/01/2017 86     MCH 12/01/2017 29 9     MCHC 12/01/2017 34 8     RDW 12/01/2017 13 7     MPV 12/01/2017 9 9     Platelets 63/31/7554 289     nRBC 12/01/2017 0     Neutrophils Relative 12/01/2017 60     Lymphocytes Relative 12/01/2017 33     Monocytes Relative 12/01/2017 5     Eosinophils Relative 12/01/2017 2     Basophils Relative 12/01/2017 0     Neutrophils Absolute 12/01/2017 3 85     Lymphocytes Absolute 12/01/2017 2 17     Monocytes Absolute 12/01/2017 0 34     Eosinophils Absolute 12/01/2017 0 10     Basophils Absolute 12/01/2017 0 02     Sodium 12/01/2017 139     Potassium 12/01/2017 3 7     Chloride 12/01/2017 105     CO2 12/01/2017 27  Anion Gap 12/01/2017 7     BUN 12/01/2017 8     Creatinine 12/01/2017 0 94     Glucose 12/01/2017 74     Calcium 12/01/2017 9 6     AST 12/01/2017 13     ALT 12/01/2017 29     Alkaline Phosphatase 12/01/2017 59     Total Protein 12/01/2017 7 6     Albumin 12/01/2017 3 9     Total Bilirubin 12/01/2017 1 30*    eGFR 12/01/2017 83     Lipase 12/01/2017 161     Color, UA 12/01/2017 yellow     EXT PREG TEST UR (Ref: N* 12/01/2017 negative     Color, UA 12/01/2017 Yellow     Clarity, UA 12/01/2017 Clear     pH, UA 12/01/2017 7 0     Leukocytes, UA 12/01/2017 Negative     Nitrite, UA 12/01/2017 Negative     Protein, UA 12/01/2017 30 (1+)*    Glucose, UA 12/01/2017 Negative     Ketones, UA 12/01/2017 40 (2+)*    Urobilinogen, UA 12/01/2017 4 0*    Bilirubin, UA 12/01/2017 Interference- unable to analyze*    Blood, UA 12/01/2017 Trace*    Specific Gravity, UA 12/01/2017 1 025     RBC, UA 12/01/2017 2-4*    WBC, UA 12/01/2017 2-4*    Epithelial Cells 12/01/2017 Moderate*    Bacteria, UA 12/01/2017 None Seen     MUCOUS THREADS 12/01/2017 Moderate     Sodium 12/02/2017 140     Potassium 12/02/2017 3 7     Chloride 12/02/2017 107     CO2 12/02/2017 26     Anion Gap 12/02/2017 7     BUN 12/02/2017 11     Creatinine 12/02/2017 0 96     Glucose 12/02/2017 73     Calcium 12/02/2017 8 8     eGFR 12/02/2017 81        Imaging Studies: I have personally reviewed pertinent imaging studies  No results found  ASSESSMENT/ PLAN:    Principal Problem:    Intractable abdominal pain  Active Problems:    Psoriatic arthritis (HCC)    Endometriosis    Fibromyalgia muscle pain    History of cholecystectomy    Drug-seeking behavior      1  Chronic abdominal pain - likely multifactorial   2  Small hiatal hernia  3  Chronic narcotic use  4  History of psoriatic arthritis and fibromyalgia  5  Endometriosis    Ms Andrae Cavazos is a 49-year-old female with a several year history of chronic abdominal pain  She has been evaluated at  multiple tertiary care centers and has undergone an extensive workup  Unfortunately, other than a small hiatal hernia there is not been any significant findings on her most recent diagnostic studies  Her most recent CBC, BMP, and lipase levels have been within normal limits  She has a minimally elevated total bilirubin level at 1 3 although her additional transaminases are within normal limits  She has undergone a recent EGD, Barium swallow, and CT scan with very few findings that could explain her above symptoms  I will  discussed with the attending if there is any additional workup at should be performed while she is admitted  One could consider repeating a C1 esterase level to evaluate for any her for herdeitary angioedema or checking a porphyria  level since it does not appear that one has been completed in the past   I discussed with the patient trying to limit her narcotic use as it would worsen her motility symptoms  The patient states she does not want to be placed on Reglan due to its known side effects  I did encourage the patient to continue to try to tolerate small meals more frequently  I will adjust her diet to a low-fiber diet  The patient is agreeable to re-attempt carefully suspension, although she does not feel that the addition of dental help her symptoms  I would recommend the patient consider follow-up with GI as an outpatient to once again attempted a esophageal manometry since she was unable to tolerate her 1st attempt  One could also consider having the patient be evaluated by motility specialist   We will be following closely with you  Please not hesitate to contact us with any questions or concerns  Additional recommendations are forthcoming      Mamta Guerrero PA-C

## 2017-12-02 NOTE — H&P
History and Physical - [facility]    Patient Information: Marc Alan 32 y o  female MRN: [id number]  Unit/Bed#: -01 Encounter: [id number]  Admitting Physician: Bri Rushing MD  PCP: Kathleen Aranda MD  Date of Admission:  12/01/17    Assessment/Plan:    Hospital Problem List:     Principal Problem:    Intractable abdominal pain  Active Problems:    Psoriatic arthritis ([address] )    Endometriosis    Fibromyalgia muscle pain    History of cholecystectomy    Drug seeking behavior    Plan for the Primary Problem(s):  · Intractable abdominal pain unclear etiology patient has a history of small hiatus hernia  · Clear liquid diet   · GI bleed consult placed  · Adult pain management protocol  · Patient exhibited feature of drug-seeking behavior requested IV Dilaudid and morphine   Keeps saying that she should not be  discharged cause her pain has never been controlled with p o  meds  Plan for Additional Problems:   · Psoriatic arthritis continue methotrexate  · Endometriosis, fibromyalgia, continue appropriate home meds    VTE Prophylaxis: Pharmacologic VTE Prophylaxis contraindicated due to Low risk  / sequential compression device   Code Status: full  POLST: There is no POLST form on file for this patient (pre-hospital)    Anticipated Length of Stay:  Patient will be admitted on an Observation basis with an anticipated length of stay of  < 2 midnights  Justification for Hospital Stay: GI consult    Total Time for Visit, including Counseling / Coordination of Care: 45 minutes  Greater than 50% of this total time spent on direct patient counseling and coordination of care  Chief Complaint:   Abdominal pain which cannot be controlled  with p o  Need of  IV Dilaudid or morphine    History of Present Illness:    Marc Alan is a 32 y o  female who presented again with intractable epigastric pain getting worse with food    Upon my assessment patient was complaining epigastric pain and wanted to get IV Dilaudid or morphine, otherwise she cannot talk  I informed her that she will be managed according to adult pain management protocol  Patient wants to  be seen by pain management specialist ,thoracic surgeon and GI  stating that  "I want to get an answer why I am in such a severe pain"  I could not get a lot of information regarding her abdominal pain as patient mostly was talking about  IV pain management  Of notes she was discharged yesterday for outpatient esophageal manometry  24 hour pH study  Looks like it was normal   She had an extensive workup during her most recent admission on 11/25-11/30, seen by multiple specialists including thoracic surgeon for hiatal hernia ,GI Pain Management and Psychiatry  According to psychiatry notes patient felt anxious because physician had been able to find out what is wrong with her  despite multiple treatment and test   Endoscopy was remarkable only for a sliding hiatus hernia, barium swallow was positive for small hiatal   Initially patient was manage with IV opioid, opioids have been tapered off as recommended by thoracic surgeon and pain management  Review of Systems:    Review of Systems   Gastrointestinal: Positive for abdominal pain  Past Medical and Surgical History:     Past Medical History:   Diagnosis Date    Abdominal discomfort     Anxiety     Arthritis     Endometritis     Fibromyalgia     GERD (gastroesophageal reflux disease)     Psoriatic arthritis (HCC)     Shortness of breath     exacerbated with rib pain       Past Surgical History:   Procedure Laterality Date    ABDOMINAL SURGERY      CHOLECYSTECTOMY      EGD AND COLONOSCOPY      ESOPHAGOGASTRODUODENOSCOPY N/A 11/28/2017    Procedure: ESOPHAGOGASTRODUODENOSCOPY (EGD); Surgeon: Bárbara Krishnan MD;  Location: [facility];   Service: Gastroenterology    LAPAROSCOPY      for endometriosis       Meds/Allergies:    Prior to Admission medications    Medication Sig Start Date End Date Taking? Authorizing Provider   ALPRAZolam Navya Broom) 1 mg tablet Take 1 mg by mouth 3 (three) times a day as needed for anxiety   Yes Historical Provider, MD   cholecalciferol (VITAMIN D3) 1,000 units tablet Take 1,000 Units by mouth daily   Yes Historical Provider, MD   clonazePAM (KlonoPIN) 0 5 mg tablet Take 0 5 mg by mouth 2 (two) times a day as needed for seizures   Yes Historical Provider, MD   cyclobenzaprine (FLEXERIL) 5 mg tablet Take 10 mg by mouth 3 (three) times a day as needed for muscle spasms   Yes Historical Provider, MD   diazepam (VALIUM) 5 mg tablet Take 5 mg by mouth every 8 (eight) hours as needed for anxiety   Yes Historical Provider, MD   DULoxetine (CYMBALTA) 60 mg delayed release capsule Take 60 mg by mouth daily   Yes Historical Provider, MD   folic acid (FOLVITE) 1 mg tablet Take 1 mg by mouth daily   Yes Historical Provider, MD   methotrexate 2 5 mg tablet Take 2 5 mg by mouth once a week   Yes Historical Provider, MD   mometasone (ELOCON) 0 1 % cream Apply 1 application topically 2 (two) times a day   Yes Historical Provider, MD   norethindrone (AYGESTIN) 5 mg tablet Take 5 mg by mouth daily   Yes Historical Provider, MD   omeprazole (PriLOSEC) 40 MG capsule Take 40 mg by mouth daily   Yes Historical Provider, MD   oxyCODONE (ROXICODONE) 5 mg/5 mL solution 5ml PO Q8h 11/30-12/2 then 5ml PO Q12H 12/3-12/5, then 5ml PO QD 12/6-12/8 11/30/17  Yes Yvonne Fowler MD   polyethylene glycol (MIRALAX) 17 g packet Take 17 g by mouth 2 (two) times a day 11/30/17  Yes Yvonne Fowler MD   pregabalin (LYRICA) 100 mg capsule Take 100 mg by mouth 2 (two) times a day   Yes Historical Provider, MD     I have reviewed home medications with a medical source (PCP, Pharmacy, other)      Allergies: No Known Allergies    Social History:     Marital Status: Single   Occupation: none  Patient Pre-hospital Living Situation: home  Patient Pre-hospital Level of Mobility: reg  Patient Pre-hospital Diet Restrictions: reg  Substance Use History:   History   Alcohol Use No     History   Smoking Status    Never Smoker   Smokeless Tobacco    Never Used     History   Drug Use No       Family History:    non-contributory    Physical Exam:     Vitals:   Blood Pressure: 131/73 (12/01/17 2134)  Pulse: 86 (12/01/17 2134)  Temperature: 98 6 °F (37 °C) (12/01/17 1758)  Respirations: 16 (12/01/17 2134)  Weight - Scale: 77 1 kg (170 lb) (12/01/17 1758)  SpO2: 99 % (12/01/17 2134)    Physical Exam   Constitutional: She appears well-developed  HENT:   Head: Normocephalic  Eyes: Pupils are equal, round, and reactive to light  Neck: No thyromegaly present  Cardiovascular:   Refused   Pulmonary/Chest:   Refused   Abdominal:   Refused   Musculoskeletal: She exhibits no edema  Neurological: She is alert  Skin: Skin is warm  Psychiatric: She has a normal mood and affect  Additional Data:     Lab Results: I have personally reviewed pertinent reports  Results from last 7 days  Lab Units 12/01/17 2001   WBC Thousand/uL 6 49   HEMOGLOBIN g/dL 14 1   HEMATOCRIT % 40 5   PLATELETS Thousands/uL 289   NEUTROS PCT % 60   LYMPHS PCT % 33   MONOS PCT % 5   EOS PCT % 2       Results from last 7 days  Lab Units 12/01/17 2001   SODIUM mmol/L 139   POTASSIUM mmol/L 3 7   CHLORIDE mmol/L 105   CO2 mmol/L 27   BUN mg/dL 8   CREATININE mg/dL 0 94   CALCIUM mg/dL 9 6   TOTAL PROTEIN g/dL 7 6   BILIRUBIN TOTAL mg/dL 1 30*   ALK PHOS U/L 59   ALT U/L 29   AST U/L 13   GLUCOSE RANDOM mg/dL 74           Imaging: I have personally reviewed pertinent reports  Fl Barium Swallow    Result Date: 11/29/2017  Narrative: BARIUM SWALLOW-ESOPHAGRAM INDICATION:  Difficulty swallowing, abdominal pain radiating COMPARISON:  None IMAGES:  20 FLUOROSCOPY TIME:   1 30MFT  TECHNIQUE: The patient was given barium by mouth and images of the esophagus were obtained   Effervescent granules were not administered as the patient has had recent endoscopy  The study was performed to evaluate primarily for dysmotility  FINDINGS: The esophagus is normal in caliber  Esophageal motility is normal and emptying of contrast from the esophagus is prompt  No mucosal lesion, ulceration or evidence of fold thickening is seen  Gastroesophageal reflux was not observed  There is a small hiatal hernia  Impression: Small hiatal hernia  Otherwise unremarkable  Workstation performed: [id number]     Ct Abdomen Pelvis With Contrast    Result Date: 11/25/2017  Narrative: CT ABDOMEN AND PELVIS WITH IV CONTRAST INDICATION:  Epigastric abdominal pain  Abdominal Pain (Pt  comes to the ER for evaluation of upper epigastric abdominal pain that woke her up this morning; pt  reports nausea and pain with eating and drinking but denies actually vomiting; pt  has extensive h/o of 2 years of being evaluated for abdominal pain with no exact cause; pt  has had pancreatic stents, galllbladder removal, feeding tube, etc, with no relief; pt  also recently dx with fibromyalgia and arthritis COMPARISON: July 13, 2011 TECHNIQUE:  CT examination of the abdomen and pelvis was performed  Reformatted images were created in axial, sagittal, and coronal planes  Radiation dose length product (DLP) for this visit:  437 03 mGy-cm   This examination, like all CT scans performed in the [facility], was performed utilizing techniques to minimize radiation dose exposure, including the use of iterative  reconstruction and automated exposure control  IV Contrast:  100 mL of iohexol (OMNIPAQUE)     Enteric Contrast:  Enteric contrast was not administered  FINDINGS: ABDOMEN LOWER CHEST:  No significant abnormalities identified in the lower chest  LIVER/BILIARY TREE:  Normal in size and contour without dilated biliary ducts    11 x 8 x 8 mm ovoid well-demarcated lesion of diminished attenuation in the left hepatic lobe not evident on previous study to small to further characterize, possibly a cyst or hemangioma  GALLBLADDER:  No calcified gallstones  No pericholecystic inflammatory change  SPLEEN:  Unremarkable  PANCREAS:  Unremarkable  ADRENAL GLANDS:  Unremarkable  KIDNEYS/URETERS:  Unremarkable  No hydronephrosis  STOMACH AND BOWEL:  Stomach and small bowel unremarkable  Gas and feces present throughout the colon, especially right hemicolon  APPENDIX:  No findings to suggest appendicitis  ABDOMINOPELVIC CAVITY:  No ascites or free intraperitoneal air  No lymphadenopathy  VESSELS:  Unremarkable for patient's age  PELVIS REPRODUCTIVE ORGANS:  Unremarkable for patient's age  URINARY BLADDER:  Unremarkable  ABDOMINAL WALL/INGUINAL REGIONS:  Unremarkable  OSSEOUS STRUCTURES:  No acute fracture or destructive osseous lesion  Impression: Mild fecal stasis  Small hepatic lesion, too small to further characterize, possibly a cyst or hemangioma  Workstation performed: [id number]       [facility] / [facility] Records Reviewed: Yes     ** Please Note: This note has been constructed using a voice recognition system   **

## 2019-10-23 PROBLEM — H69.82 ETD (EUSTACHIAN TUBE DYSFUNCTION), LEFT: Status: ACTIVE | Noted: 2019-10-23

## 2019-10-23 PROBLEM — H69.92 ETD (EUSTACHIAN TUBE DYSFUNCTION), LEFT: Status: ACTIVE | Noted: 2019-10-23

## 2019-10-23 PROBLEM — H60.8X2 CHRONIC ECZEMATOUS OTITIS EXTERNA OF LEFT EAR: Status: ACTIVE | Noted: 2019-10-23

## 2019-10-29 PROCEDURE — 87205 SMEAR GRAM STAIN: CPT | Performed by: OTOLARYNGOLOGY

## 2019-10-29 PROCEDURE — 87070 CULTURE OTHR SPECIMN AEROBIC: CPT | Performed by: OTOLARYNGOLOGY

## 2021-06-29 ENCOUNTER — APPOINTMENT (OUTPATIENT)
Dept: RADIOLOGY | Facility: CLINIC | Age: 31
End: 2021-06-29
Payer: COMMERCIAL

## 2021-06-29 PROCEDURE — 72052 X-RAY EXAM NECK SPINE 6/>VWS: CPT

## 2022-03-15 ENCOUNTER — CONSULT (OUTPATIENT)
Dept: SURGERY | Facility: CLINIC | Age: 32
End: 2022-03-15
Payer: COMMERCIAL

## 2022-03-15 ENCOUNTER — PREP FOR PROCEDURE (OUTPATIENT)
Dept: SURGERY | Facility: CLINIC | Age: 32
End: 2022-03-15

## 2022-03-15 VITALS
OXYGEN SATURATION: 100 % | DIASTOLIC BLOOD PRESSURE: 88 MMHG | WEIGHT: 167 LBS | TEMPERATURE: 98.9 F | BODY MASS INDEX: 26.84 KG/M2 | SYSTOLIC BLOOD PRESSURE: 136 MMHG | HEIGHT: 66 IN | HEART RATE: 114 BPM

## 2022-03-15 DIAGNOSIS — K36 CHRONIC APPENDICITIS: Primary | ICD-10-CM

## 2022-03-15 PROCEDURE — 99204 OFFICE O/P NEW MOD 45 MIN: CPT | Performed by: SPECIALIST

## 2022-03-15 RX ORDER — IBUPROFEN 800 MG/1
800 TABLET ORAL EVERY 6 HOURS PRN
COMMUNITY

## 2022-03-15 NOTE — LETTER
March 16, 2022     River Sierra MD  [address]    Patient: Scooby Hassan   YOB: 1990   Date of Visit: 3/15/2022       Dear Abril gleason,     Thank you for referring Elysia Cruz to me for evaluation  Below are the relevant portions of my H&P  If you have questions, please do not hesitate to call me  I look forward to following [facility] along with you  Sincerely,    Maritza Burnett MD        CC: MD Katarian Paul MD  3/16/2022  5:39 PM  Signed  Chief Complaint:  Chronic appendicitis      History of Present Illness:  Patient is a 79-year-old white female with a long history of endometriosis  She has had multiple laparoscopies in the past to verify and treat this problem  She also complains of chronic right lower quadrant abdominal pain  She has been laparoscopic for this and most recently a few weeks ago she underwent laparoscopic right oophorectomy  Be that as it may her pain persists  At the time of this laparoscopy her as pending sweats inspected and appeared to be contracted with some fibrosis present  The possibility of chronic appendicitis was discussed with the patient and she is here today in regards to this  She says the pain is constant in the right lower quadrant sometimes worse than others  She denies any GI component to this pain no nausea vomiting diarrhea constipation  She is here today discuss possible laparoscopic appendectomy        Past Medical History:   Past Medical History:   Diagnosis Date    Abdominal discomfort     Abnormal Pap smear of cervix     Anxiety     Arthritis     CFS (chronic fatigue syndrome)     COVID-19 12/2021    Endometriosis     Endometritis     Epigastric pain     Fibromyalgia     GERD (gastroesophageal reflux disease)     IBS (irritable bowel syndrome)     Major depressive disorder     Myalgia of pelvic floor     Pelvic pain     PONV (postoperative nausea and vomiting)     Psoriatic arthritis (HCC)     Shortness of breath     exacerbated with rib pain    Sphincter of Oddi dysfunction     Urinary retention          Past Surgical History:    Past Surgical History:   Procedure Laterality Date    ABDOMINAL SURGERY      CHOLECYSTECTOMY      DIAGNOSTIC LAPAROSCOPY      EGD AND COLONOSCOPY      ERCP      ESOPHAGOGASTRODUODENOSCOPY N/A 11/28/2017    Procedure: ESOPHAGOGASTRODUODENOSCOPY (EGD); Surgeon: Anais Madrigal MD;  Location:  GI LAB;   Service: Gastroenterology    LAPAROSCOPIC CHOLECYSTECTOMY      LAPAROSCOPY      for endometriosis    WISDOM TOOTH EXTRACTION           Allergies:  No Known Allergies      Medications:    Current Outpatient Medications:     cyclobenzaprine (FLEXERIL) 5 mg tablet, Take 5 mg by mouth 3 (three) times a day as needed for muscle spasms, Disp: , Rfl:     drospirenone-ethinyl estradiol (PETE) 3-0 02 MG per tablet, Take 1 tablet by mouth daily, Disp: , Rfl:     ibuprofen (MOTRIN) 800 mg tablet, Take 800 mg by mouth every 6 (six) hours as needed for mild pain, Disp: , Rfl:     cholecalciferol (VITAMIN D3) 1,000 units tablet, Take 1,000 Units by mouth daily, Disp: , Rfl:     clonazePAM (KlonoPIN) 0 5 mg tablet, Take 0 5 mg by mouth 2 (two) times a day as needed for seizures, Disp: , Rfl:     DULoxetine (CYMBALTA) 60 mg delayed release capsule, Take 60 mg by mouth daily, Disp: , Rfl:     folic acid (FOLVITE) 1 mg tablet, Take 1 mg by mouth daily, Disp: , Rfl:     gabapentin (NEURONTIN) 300 mg capsule, Take 1 capsule (300 mg total) by mouth 3 (three) times a day for 10 days, Disp: 30 capsule, Rfl: 0    ibuprofen (IBUPROFEN 100 SABINE STRENGTH) 100 MG chewable tablet, Chew 500 mg every 8 (eight) hours as needed for mild pain, Disp: , Rfl:     mometasone (ELOCON) 0 1 % cream, Apply topically daily Apply to left ear at bedtime daily, Disp: 45 g, Rfl: 4    norethindrone (AYGESTIN) 5 mg tablet, Take 5 mg by mouth daily, Disp: , Rfl:     ofloxacin (OCUFLOX) 0 3 % ophthalmic solution, 4 drops left ear two times daily, Disp: 5 mL, Rfl: 0    omeprazole (PriLOSEC) 40 MG capsule, Take 40 mg by mouth daily, Disp: , Rfl:     oxyCODONE (ROXICODONE) 5 mg/5 mL solution, 5ml PO Q8h - then 5ml PO Q12H 3-, then 5ml PO QD - (Patient not taking: Reported on 10/29/2019), Disp: 200 mL, Rfl: 0    polyethylene glycol (MIRALAX) 17 g packet, Take 17 g by mouth 2 (two) times a day (Patient not taking: Reported on 10/29/2019), Disp: 14 each, Rfl: 0    predniSONE 10 mg tablet, TAKE 4 TABLETS BY MOUTH DAILY FOR 2 DAYS, 3 TABS FOR 2 DAYS, 2 TABS FOR 2 DAYS, 1 TAB FOR 2 DAYS, Disp: , Rfl: 0    sucralfate (CARAFATE) 1 g/10 mL suspension, Take 10 mL by mouth every 6 (six) hours for 14 days, Disp: 420 mL, Rfl: 0    traMADol (ULTRAM) 50 mg tablet, TAKE 1-2 TABLETS BY MOUTH 4 TIMES A DAY AS NEEDED FOR PAIN, Disp: , Rfl: 1      Social History:  Social History     Social History     Substance and Sexual Activity   Alcohol Use Yes    Alcohol/week: 1 0 standard drink    Types: 1 Glasses of wine per week     Social History     Substance and Sexual Activity   Drug Use Yes    Types: Marijuana    Comment: Pt has [employer] card     Social History     Tobacco Use   Smoking Status Current Every Day Smoker    Packs/day: 0 25    Years: 5 00    Pack years: 1 25    Types: Cigarettes    Last attempt to quit: 2017    Years since quittin 2   Smokeless Tobacco Never Used   Tobacco Comment    3/16/22 pt is currently smoking about 5 cigarettes a day         Family History:    Family History   Problem Relation Age of Onset    No Known Problems Mother     No Known Problems Father     Alzheimer's disease Maternal Grandmother     Esophageal cancer Maternal Grandfather     Depression Paternal Grandmother     Suicidality Paternal Grandmother     No Known Problems Paternal Grandfather          Review of Systems:    As per the HPI    Chronic right lower quadrant abdominal pain  She denies weight loss weight gain fever chills night sweats chest pain nausea vomiting diarrhea constipation shortness of breath headaches blurry vision double vision sore throat chronic cough dysuria hematuria etcetera  Vitals:  Vitals:    03/15/22 1401   BP: 136/88   Pulse: (!) 114   Temp: 98 9 °F (37 2 °C)   SpO2: 100%       Physical Exam:  Patient young adult white female 5 ft 6 in 167  lb who is awake alert no distress  Vital signs as above    Skin warm dry   Head normocephalic and atraumatic  Eyes AKHIL a m  Intact  Ears and nose within normal limits  Throat gag reflex intact  Neck no masses thyromegaly lymphadenopathy palpable  Back no CVA or spinal tenderness  Lungs clear to a and P  Cor regular rate and rhythm no murmurs carotid bruits  Abdomen flat multiple laparoscopic incisions are noted that are small and in both the upper right quadrant and also the lower quadrants  There is no evidence of infection or incisional hernia noted  She does have some right lower quadrant tenderness to palpation  No guarding or rebound noted  Extremities negative CC E  Neurologically A&O x3 cranial nerves 2-12 intact  Lymphatics no lymphadenopathy palpable  Lab Results: I have personally reviewed pertinent reports  See below  Imaging: I have personally reviewed pertinent reports  EKG, Pathology, and Other Studies: I have personally reviewed pertinent reports  No visits with results within 1 Day(s) from this visit  Latest known visit with results is:   Office Visit on 10/29/2019   Component Date Value    Wound Culture 10/29/2019 Growth in Broth culture only Staphylococcus coagulase negative*    Gram Stain Result 10/29/2019 No Polys or Bacteria seen          Impression:  Chronic persistent right lower quadrant pain  The appendix looked abnormal on most recent laparoscopic procedure    Also it is the only remaining organ that could conceivably cause problems in this area     Plan:  Laparoscopic appendectomy at the earliest possible date

## 2022-03-16 NOTE — PRE-PROCEDURE INSTRUCTIONS
Pre-Surgery Instructions:   Medication Instructions    cyclobenzaprine (FLEXERIL) 5 mg tablet Instructed patient per Anesthesia Guidelines   drospirenone-ethinyl estradiol (PETE) 3-0 02 MG per tablet Instructed patient per Anesthesia Guidelines   ibuprofen (MOTRIN) 800 mg tablet Instructed patient per Anesthesia Guidelines  Pt will not be taking any medications the am of surgery  Pt takes birth control at night    Pt was instructed to not start any vitamins/supplements, take any aspirin or NSAIDS until after sx on 3/18  Pt was instructed to use tylenol prn pain   Pt verb understanding  All other pre-op instructions reviewed:  NPO after MN  Showering instructions reviewed

## 2022-03-16 NOTE — H&P
Chief Complaint:  Chronic appendicitis      History of Present Illness:  Patient is a 69-year-old white female with a long history of endometriosis  She has had multiple laparoscopies in the past to verify and treat this problem  She also complains of chronic right lower quadrant abdominal pain  She has been laparoscopic for this and most recently a few weeks ago she underwent laparoscopic right oophorectomy  Be that as it may her pain persists  At the time of this laparoscopy her as pending sweats inspected and appeared to be contracted with some fibrosis present  The possibility of chronic appendicitis was discussed with the patient and she is here today in regards to this  She says the pain is constant in the right lower quadrant sometimes worse than others  She denies any GI component to this pain no nausea vomiting diarrhea constipation  She is here today discuss possible laparoscopic appendectomy  Past Medical History:   Past Medical History:   Diagnosis Date    Abdominal discomfort     Abnormal Pap smear of cervix     Anxiety     Arthritis     CFS (chronic fatigue syndrome)     COVID-19 12/2021    Endometriosis     Endometritis     Epigastric pain     Fibromyalgia     GERD (gastroesophageal reflux disease)     IBS (irritable bowel syndrome)     Major depressive disorder     Myalgia of pelvic floor     Pelvic pain     PONV (postoperative nausea and vomiting)     Psoriatic arthritis (HCC)     Shortness of breath     exacerbated with rib pain    Sphincter of Oddi dysfunction     Urinary retention          Past Surgical History:    Past Surgical History:   Procedure Laterality Date    ABDOMINAL SURGERY      CHOLECYSTECTOMY      DIAGNOSTIC LAPAROSCOPY      EGD AND COLONOSCOPY      ERCP      ESOPHAGOGASTRODUODENOSCOPY N/A 11/28/2017    Procedure: ESOPHAGOGASTRODUODENOSCOPY (EGD); Surgeon: Lidia Núñez MD;  Location: [facility];   Service: Gastroenterology    LAPAROSCOPIC CHOLECYSTECTOMY      LAPAROSCOPY      for endometriosis    WISDOM TOOTH EXTRACTION           Allergies:  No Known Allergies      Medications:    Current Outpatient Medications:     cyclobenzaprine (FLEXERIL) 5 mg tablet, Take 5 mg by mouth 3 (three) times a day as needed for muscle spasms, Disp: , Rfl:     drospirenone-ethinyl estradiol (PETE) 3-0 02 MG per tablet, Take 1 tablet by mouth daily, Disp: , Rfl:     ibuprofen (MOTRIN) 800 mg tablet, Take 800 mg by mouth every 6 (six) hours as needed for mild pain, Disp: , Rfl:     cholecalciferol (VITAMIN D3) 1,000 units tablet, Take 1,000 Units by mouth daily, Disp: , Rfl:     clonazePAM (KlonoPIN) 0 5 mg tablet, Take 0 5 mg by mouth 2 (two) times a day as needed for seizures, Disp: , Rfl:     DULoxetine (CYMBALTA) 60 mg delayed release capsule, Take 60 mg by mouth daily, Disp: , Rfl:     folic acid (FOLVITE) 1 mg tablet, Take 1 mg by mouth daily, Disp: , Rfl:     gabapentin (NEURONTIN) 300 mg capsule, Take 1 capsule (300 mg total) by mouth 3 (three) times a day for 10 days, Disp: 30 capsule, Rfl: 0    ibuprofen (IBUPROFEN 100 SABINE STRENGTH) 100 MG chewable tablet, Chew 500 mg every 8 (eight) hours as needed for mild pain, Disp: , Rfl:     mometasone (ELOCON) 0 1 % cream, Apply topically daily Apply to left ear at bedtime daily, Disp: 45 g, Rfl: 4    norethindrone (AYGESTIN) 5 mg tablet, Take 5 mg by mouth daily, Disp: , Rfl:     ofloxacin (OCUFLOX) 0 3 % ophthalmic solution, 4 drops left ear two times daily, Disp: 5 mL, Rfl: 0    omeprazole (PriLOSEC) 40 MG capsule, Take 40 mg by mouth daily, Disp: , Rfl:     oxyCODONE (ROXICODONE) 5 mg/5 mL solution, 5ml PO Q8h 11/30-12/2 then 5ml PO Q12H 12/3-12/5, then 5ml PO QD 12/6-12/8 (Patient not taking: Reported on 10/29/2019), Disp: 200 mL, Rfl: 0    polyethylene glycol (MIRALAX) 17 g packet, Take 17 g by mouth 2 (two) times a day (Patient not taking: Reported on 10/29/2019), Disp: 14 each, Rfl: 0   predniSONE 10 mg tablet, TAKE 4 TABLETS BY MOUTH DAILY FOR 2 DAYS, 3 TABS FOR 2 DAYS, 2 TABS FOR 2 DAYS, 1 TAB FOR 2 DAYS, Disp: , Rfl: 0    sucralfate (CARAFATE) 1 g/10 mL suspension, Take 10 mL by mouth every 6 (six) hours for 14 days, Disp: 420 mL, Rfl: 0    traMADol (ULTRAM) 50 mg tablet, TAKE 1-2 TABLETS BY MOUTH 4 TIMES A DAY AS NEEDED FOR PAIN, Disp: , Rfl: 1      Social History:  Social History     Social History     Substance and Sexual Activity   Alcohol Use Yes    Alcohol/week: 1 0 standard drink    Types: 1 Glasses of wine per week     Social History     Substance and Sexual Activity   Drug Use Yes    Types: Marijuana    Comment: Pt has [employer] card     Social History     Tobacco Use   Smoking Status Current Every Day Smoker    Packs/day: 0 25    Years: 5 00    Pack years: 1 25    Types: Cigarettes    Last attempt to quit:     Years since quittin 2   Smokeless Tobacco Never Used   Tobacco Comment    3/16/22 pt is currently smoking about 5 cigarettes a day         Family History:    Family History   Problem Relation Age of Onset    No Known Problems Mother     No Known Problems Father     Alzheimer's disease Maternal Grandmother     Esophageal cancer Maternal Grandfather     Depression Paternal Grandmother     Suicidality Paternal Grandmother     No Known Problems Paternal Grandfather          Review of Systems:    As per the HPI  Chronic right lower quadrant abdominal pain  She denies weight loss weight gain fever chills night sweats chest pain nausea vomiting diarrhea constipation shortness of breath headaches blurry vision double vision sore throat chronic cough dysuria hematuria etcetera  Vitals:  Vitals:    03/15/22 1401   BP: 136/88   Pulse: (!) 114   Temp: 98 9 °F (37 2 °C)   SpO2: 100%       Physical Exam:  Patient young adult white female 5 ft 6 in 167  lb who is awake alert no distress      Vital signs as above    Skin warm dry   Head normocephalic and atraumatic  Eyes AKHIL a m  Intact  Ears and nose within normal limits  Throat gag reflex intact  Neck no masses thyromegaly lymphadenopathy palpable  Back no CVA or spinal tenderness  Lungs clear to a and P  Cor regular rate and rhythm no murmurs carotid bruits  Abdomen flat multiple laparoscopic incisions are noted that are small and in both the upper right quadrant and also the lower quadrants  There is no evidence of infection or incisional hernia noted  She does have some right lower quadrant tenderness to palpation  No guarding or rebound noted  Extremities negative CC E  Neurologically A&O x3 cranial nerves 2-12 intact  Lymphatics no lymphadenopathy palpable  Lab Results: I have personally reviewed pertinent reports  See below  Imaging: I have personally reviewed pertinent reports  EKG, Pathology, and Other Studies: I have personally reviewed pertinent reports  No visits with results within 1 Day(s) from this visit  Latest known visit with results is:   Office Visit on 10/29/2019   Component Date Value    Wound Culture 10/29/2019 Growth in Broth culture only Staphylococcus coagulase negative*    Gram Stain Result 10/29/2019 No Polys or Bacteria seen          Impression:  Chronic persistent right lower quadrant pain  The appendix looked abnormal on most recent laparoscopic procedure  Also it is the only remaining organ that could conceivably cause problems in this area      Plan:  Laparoscopic appendectomy at the earliest possible date

## 2022-03-16 NOTE — H&P (VIEW-ONLY)
Chief Complaint:  Chronic appendicitis      History of Present Illness:  Patient is a 51-year-old white female with a long history of endometriosis  She has had multiple laparoscopies in the past to verify and treat this problem  She also complains of chronic right lower quadrant abdominal pain  She has been laparoscopic for this and most recently a few weeks ago she underwent laparoscopic right oophorectomy  Be that as it may her pain persists  At the time of this laparoscopy her as pending sweats inspected and appeared to be contracted with some fibrosis present  The possibility of chronic appendicitis was discussed with the patient and she is here today in regards to this  She says the pain is constant in the right lower quadrant sometimes worse than others  She denies any GI component to this pain no nausea vomiting diarrhea constipation  She is here today discuss possible laparoscopic appendectomy  Past Medical History:   Past Medical History:   Diagnosis Date    Abdominal discomfort     Abnormal Pap smear of cervix     Anxiety     Arthritis     CFS (chronic fatigue syndrome)     COVID-19 12/2021    Endometriosis     Endometritis     Epigastric pain     Fibromyalgia     GERD (gastroesophageal reflux disease)     IBS (irritable bowel syndrome)     Major depressive disorder     Myalgia of pelvic floor     Pelvic pain     PONV (postoperative nausea and vomiting)     Psoriatic arthritis (HCC)     Shortness of breath     exacerbated with rib pain    Sphincter of Oddi dysfunction     Urinary retention          Past Surgical History:    Past Surgical History:   Procedure Laterality Date    ABDOMINAL SURGERY      CHOLECYSTECTOMY      DIAGNOSTIC LAPAROSCOPY      EGD AND COLONOSCOPY      ERCP      ESOPHAGOGASTRODUODENOSCOPY N/A 11/28/2017    Procedure: ESOPHAGOGASTRODUODENOSCOPY (EGD); Surgeon: Mili Mas MD;  Location: [facility];   Service: Gastroenterology    LAPAROSCOPIC CHOLECYSTECTOMY      LAPAROSCOPY      for endometriosis    WISDOM TOOTH EXTRACTION           Allergies:  No Known Allergies      Medications:    Current Outpatient Medications:     cyclobenzaprine (FLEXERIL) 5 mg tablet, Take 5 mg by mouth 3 (three) times a day as needed for muscle spasms, Disp: , Rfl:     drospirenone-ethinyl estradiol (PETE) 3-0 02 MG per tablet, Take 1 tablet by mouth daily, Disp: , Rfl:     ibuprofen (MOTRIN) 800 mg tablet, Take 800 mg by mouth every 6 (six) hours as needed for mild pain, Disp: , Rfl:     cholecalciferol (VITAMIN D3) 1,000 units tablet, Take 1,000 Units by mouth daily, Disp: , Rfl:     clonazePAM (KlonoPIN) 0 5 mg tablet, Take 0 5 mg by mouth 2 (two) times a day as needed for seizures, Disp: , Rfl:     DULoxetine (CYMBALTA) 60 mg delayed release capsule, Take 60 mg by mouth daily, Disp: , Rfl:     folic acid (FOLVITE) 1 mg tablet, Take 1 mg by mouth daily, Disp: , Rfl:     gabapentin (NEURONTIN) 300 mg capsule, Take 1 capsule (300 mg total) by mouth 3 (three) times a day for 10 days, Disp: 30 capsule, Rfl: 0    ibuprofen (IBUPROFEN 100 SABINE STRENGTH) 100 MG chewable tablet, Chew 500 mg every 8 (eight) hours as needed for mild pain, Disp: , Rfl:     mometasone (ELOCON) 0 1 % cream, Apply topically daily Apply to left ear at bedtime daily, Disp: 45 g, Rfl: 4    norethindrone (AYGESTIN) 5 mg tablet, Take 5 mg by mouth daily, Disp: , Rfl:     ofloxacin (OCUFLOX) 0 3 % ophthalmic solution, 4 drops left ear two times daily, Disp: 5 mL, Rfl: 0    omeprazole (PriLOSEC) 40 MG capsule, Take 40 mg by mouth daily, Disp: , Rfl:     oxyCODONE (ROXICODONE) 5 mg/5 mL solution, 5ml PO Q8h 11/30-12/2 then 5ml PO Q12H 12/3-12/5, then 5ml PO QD 12/6-12/8 (Patient not taking: Reported on 10/29/2019), Disp: 200 mL, Rfl: 0    polyethylene glycol (MIRALAX) 17 g packet, Take 17 g by mouth 2 (two) times a day (Patient not taking: Reported on 10/29/2019), Disp: 14 each, Rfl: 0   predniSONE 10 mg tablet, TAKE 4 TABLETS BY MOUTH DAILY FOR 2 DAYS, 3 TABS FOR 2 DAYS, 2 TABS FOR 2 DAYS, 1 TAB FOR 2 DAYS, Disp: , Rfl: 0    sucralfate (CARAFATE) 1 g/10 mL suspension, Take 10 mL by mouth every 6 (six) hours for 14 days, Disp: 420 mL, Rfl: 0    traMADol (ULTRAM) 50 mg tablet, TAKE 1-2 TABLETS BY MOUTH 4 TIMES A DAY AS NEEDED FOR PAIN, Disp: , Rfl: 1      Social History:  Social History     Social History     Substance and Sexual Activity   Alcohol Use Yes    Alcohol/week: 1 0 standard drink    Types: 1 Glasses of wine per week     Social History     Substance and Sexual Activity   Drug Use Yes    Types: Marijuana    Comment: Pt has [employer] card     Social History     Tobacco Use   Smoking Status Current Every Day Smoker    Packs/day: 0 25    Years: 5 00    Pack years: 1 25    Types: Cigarettes    Last attempt to quit:     Years since quittin 2   Smokeless Tobacco Never Used   Tobacco Comment    3/16/22 pt is currently smoking about 5 cigarettes a day         Family History:    Family History   Problem Relation Age of Onset    No Known Problems Mother     No Known Problems Father     Alzheimer's disease Maternal Grandmother     Esophageal cancer Maternal Grandfather     Depression Paternal Grandmother     Suicidality Paternal Grandmother     No Known Problems Paternal Grandfather          Review of Systems:    As per the HPI  Chronic right lower quadrant abdominal pain  She denies weight loss weight gain fever chills night sweats chest pain nausea vomiting diarrhea constipation shortness of breath headaches blurry vision double vision sore throat chronic cough dysuria hematuria etcetera  Vitals:  Vitals:    03/15/22 1401   BP: 136/88   Pulse: (!) 114   Temp: 98 9 °F (37 2 °C)   SpO2: 100%       Physical Exam:  Patient young adult white female 5 ft 6 in 167  lb who is awake alert no distress      Vital signs as above    Skin warm dry   Head normocephalic and atraumatic  Eyes AKHIL a m  Intact  Ears and nose within normal limits  Throat gag reflex intact  Neck no masses thyromegaly lymphadenopathy palpable  Back no CVA or spinal tenderness  Lungs clear to a and P  Cor regular rate and rhythm no murmurs carotid bruits  Abdomen flat multiple laparoscopic incisions are noted that are small and in both the upper right quadrant and also the lower quadrants  There is no evidence of infection or incisional hernia noted  She does have some right lower quadrant tenderness to palpation  No guarding or rebound noted  Extremities negative CC E  Neurologically A&O x3 cranial nerves 2-12 intact  Lymphatics no lymphadenopathy palpable  Lab Results: I have personally reviewed pertinent reports  See below  Imaging: I have personally reviewed pertinent reports  EKG, Pathology, and Other Studies: I have personally reviewed pertinent reports  No visits with results within 1 Day(s) from this visit  Latest known visit with results is:   Office Visit on 10/29/2019   Component Date Value    Wound Culture 10/29/2019 Growth in Broth culture only Staphylococcus coagulase negative*    Gram Stain Result 10/29/2019 No Polys or Bacteria seen          Impression:  Chronic persistent right lower quadrant pain  The appendix looked abnormal on most recent laparoscopic procedure  Also it is the only remaining organ that could conceivably cause problems in this area      Plan:  Laparoscopic appendectomy at the earliest possible date

## 2022-03-17 ENCOUNTER — ANESTHESIA EVENT (OUTPATIENT)
Dept: PERIOP | Facility: HOSPITAL | Age: 32
End: 2022-03-17
Payer: COMMERCIAL

## 2022-03-17 PROBLEM — Z98.890 PONV (POSTOPERATIVE NAUSEA AND VOMITING): Status: ACTIVE | Noted: 2022-03-17

## 2022-03-17 PROBLEM — R11.2 PONV (POSTOPERATIVE NAUSEA AND VOMITING): Status: ACTIVE | Noted: 2022-03-17

## 2022-03-17 NOTE — ANESTHESIA PREPROCEDURE EVALUATION
Procedure:  LAPAROSCOPIC APPENDECTOMY (N/A Abdomen)    Relevant Problems   ANESTHESIA   (+) PONV (postoperative nausea and vomiting)      MUSCULOSKELETAL   (+) Fibromyalgia muscle pain   (+) Psoriatic arthritis (HCC)      NEURO/PSYCH   (+) Fibromyalgia muscle pain      Other   (+) Drug-seeking behavior   (+) ETD (Eustachian tube dysfunction), left   (+) Endometriosis   (+) History of cholecystectomy   (+) Intractable abdominal pain        Physical Exam    Airway    Mallampati score: II  TM Distance: >3 FB  Neck ROM: full     Dental       Cardiovascular  Cardiovascular exam normal    Pulmonary  Pulmonary exam normal     Other Findings        Anesthesia Plan  ASA Score- 2     Anesthesia Type- general with ASA Monitors  Additional Monitors:   Airway Plan: ETT  Plan Factors-Exercise tolerance (METS): >4 METS  Chart reviewed  Existing labs reviewed  Patient summary reviewed  Patient is a current smoker  Patient instructed to abstain from smoking on day of procedure  Patient did not smoke on day of surgery  Induction- intravenous  Postoperative Plan- Plan for postoperative opioid use  Informed Consent- Anesthetic plan and risks discussed with patient  I personally reviewed this patient with the CRNA  Discussed and agreed on the Anesthesia Plan with the CRNA  Inderjit Roy           No results found for: HGBA1C    Lab Results   Component Value Date    K 3 7 12/02/2017     12/02/2017    CO2 26 12/02/2017    BUN 11 12/02/2017    CREATININE 0 96 12/02/2017    GLUCOSE 92 11/25/2017    GLUF 77 11/26/2017    CALCIUM 8 8 12/02/2017    AST 13 12/01/2017    ALT 29 12/01/2017    ALKPHOS 59 12/01/2017    EGFR 81 12/02/2017       Lab Results   Component Value Date    WBC 6 49 12/01/2017    HGB 14 1 12/01/2017    HCT 40 5 12/01/2017    MCV 86 12/01/2017     12/01/2017

## 2022-03-18 ENCOUNTER — HOSPITAL ENCOUNTER (OUTPATIENT)
Facility: HOSPITAL | Age: 32
Setting detail: OUTPATIENT SURGERY
Discharge: HOME | End: 2022-03-18
Attending: SPECIALIST | Admitting: SPECIALIST
Payer: COMMERCIAL

## 2022-03-18 ENCOUNTER — ANESTHESIA (OUTPATIENT)
Dept: PERIOP | Facility: HOSPITAL | Age: 32
End: 2022-03-18
Payer: COMMERCIAL

## 2022-03-18 VITALS
BODY MASS INDEX: 26.2 KG/M2 | RESPIRATION RATE: 18 BRPM | DIASTOLIC BLOOD PRESSURE: 61 MMHG | SYSTOLIC BLOOD PRESSURE: 116 MMHG | TEMPERATURE: 97.9 F | HEART RATE: 73 BPM | WEIGHT: 163 LBS | OXYGEN SATURATION: 98 % | HEIGHT: 66 IN

## 2022-03-18 DIAGNOSIS — K36 CHRONIC APPENDICITIS: Primary | ICD-10-CM

## 2022-03-18 LAB
EXT PREGNANCY TEST URINE: NEGATIVE
EXT. CONTROL: NORMAL

## 2022-03-18 PROCEDURE — 88313 SPECIAL STAINS GROUP 2: CPT | Performed by: PATHOLOGY

## 2022-03-18 PROCEDURE — 88304 TISSUE EXAM BY PATHOLOGIST: CPT | Performed by: PATHOLOGY

## 2022-03-18 PROCEDURE — 88341 IMHCHEM/IMCYTCHM EA ADD ANTB: CPT | Performed by: PATHOLOGY

## 2022-03-18 PROCEDURE — 44970 LAPAROSCOPY APPENDECTOMY: CPT | Performed by: SPECIALIST

## 2022-03-18 PROCEDURE — 88342 IMHCHEM/IMCYTCHM 1ST ANTB: CPT | Performed by: PATHOLOGY

## 2022-03-18 PROCEDURE — 81025 URINE PREGNANCY TEST: CPT | Performed by: SPECIALIST

## 2022-03-18 RX ORDER — HYDROMORPHONE HCL 110MG/55ML
PATIENT CONTROLLED ANALGESIA SYRINGE INTRAVENOUS AS NEEDED
Status: DISCONTINUED | OUTPATIENT
Start: 2022-03-18 | End: 2022-03-18

## 2022-03-18 RX ORDER — PROPOFOL 10 MG/ML
INJECTION, EMULSION INTRAVENOUS AS NEEDED
Status: DISCONTINUED | OUTPATIENT
Start: 2022-03-18 | End: 2022-03-18

## 2022-03-18 RX ORDER — OXYCODONE HYDROCHLORIDE AND ACETAMINOPHEN 5; 325 MG/1; MG/1
2 TABLET ORAL EVERY 4 HOURS PRN
Status: DISCONTINUED | OUTPATIENT
Start: 2022-03-18 | End: 2022-03-18 | Stop reason: HOSPADM

## 2022-03-18 RX ORDER — KETOROLAC TROMETHAMINE 30 MG/ML
INJECTION, SOLUTION INTRAMUSCULAR; INTRAVENOUS AS NEEDED
Status: DISCONTINUED | OUTPATIENT
Start: 2022-03-18 | End: 2022-03-18

## 2022-03-18 RX ORDER — SODIUM CHLORIDE 9 MG/ML
INJECTION, SOLUTION INTRAVENOUS AS NEEDED
Status: DISCONTINUED | OUTPATIENT
Start: 2022-03-18 | End: 2022-03-18 | Stop reason: HOSPADM

## 2022-03-18 RX ORDER — HEPARIN SODIUM 5000 [USP'U]/ML
INJECTION, SOLUTION INTRAVENOUS; SUBCUTANEOUS AS NEEDED
Status: DISCONTINUED | OUTPATIENT
Start: 2022-03-18 | End: 2022-03-18

## 2022-03-18 RX ORDER — LIDOCAINE HYDROCHLORIDE 10 MG/ML
INJECTION, SOLUTION EPIDURAL; INFILTRATION; INTRACAUDAL; PERINEURAL AS NEEDED
Status: DISCONTINUED | OUTPATIENT
Start: 2022-03-18 | End: 2022-03-18

## 2022-03-18 RX ORDER — NEOSTIGMINE METHYLSULFATE 1 MG/ML
INJECTION INTRAVENOUS AS NEEDED
Status: DISCONTINUED | OUTPATIENT
Start: 2022-03-18 | End: 2022-03-18

## 2022-03-18 RX ORDER — ONDANSETRON 2 MG/ML
INJECTION INTRAMUSCULAR; INTRAVENOUS AS NEEDED
Status: DISCONTINUED | OUTPATIENT
Start: 2022-03-18 | End: 2022-03-18

## 2022-03-18 RX ORDER — SODIUM CHLORIDE, SODIUM LACTATE, POTASSIUM CHLORIDE, CALCIUM CHLORIDE 600; 310; 30; 20 MG/100ML; MG/100ML; MG/100ML; MG/100ML
50 INJECTION, SOLUTION INTRAVENOUS CONTINUOUS
Status: DISCONTINUED | OUTPATIENT
Start: 2022-03-18 | End: 2022-03-18 | Stop reason: HOSPADM

## 2022-03-18 RX ORDER — GLYCOPYRROLATE 0.2 MG/ML
INJECTION INTRAMUSCULAR; INTRAVENOUS AS NEEDED
Status: DISCONTINUED | OUTPATIENT
Start: 2022-03-18 | End: 2022-03-18

## 2022-03-18 RX ORDER — OXYCODONE HYDROCHLORIDE AND ACETAMINOPHEN 5; 325 MG/1; MG/1
1 TABLET ORAL EVERY 6 HOURS PRN
Qty: 20 TABLET | Refills: 0 | Status: SHIPPED | OUTPATIENT
Start: 2022-03-18 | End: 2022-03-28

## 2022-03-18 RX ORDER — MIDAZOLAM HYDROCHLORIDE 2 MG/2ML
INJECTION, SOLUTION INTRAMUSCULAR; INTRAVENOUS AS NEEDED
Status: DISCONTINUED | OUTPATIENT
Start: 2022-03-18 | End: 2022-03-18

## 2022-03-18 RX ORDER — ONDANSETRON 2 MG/ML
4 INJECTION INTRAMUSCULAR; INTRAVENOUS ONCE AS NEEDED
Status: DISCONTINUED | OUTPATIENT
Start: 2022-03-18 | End: 2022-03-18 | Stop reason: HOSPADM

## 2022-03-18 RX ORDER — CEFAZOLIN SODIUM 2 G/50ML
2000 SOLUTION INTRAVENOUS ONCE
Status: COMPLETED | OUTPATIENT
Start: 2022-03-18 | End: 2022-03-18

## 2022-03-18 RX ORDER — FENTANYL CITRATE 50 UG/ML
INJECTION, SOLUTION INTRAMUSCULAR; INTRAVENOUS AS NEEDED
Status: DISCONTINUED | OUTPATIENT
Start: 2022-03-18 | End: 2022-03-18

## 2022-03-18 RX ORDER — ROCURONIUM BROMIDE 10 MG/ML
INJECTION, SOLUTION INTRAVENOUS AS NEEDED
Status: DISCONTINUED | OUTPATIENT
Start: 2022-03-18 | End: 2022-03-18

## 2022-03-18 RX ORDER — PROMETHAZINE HYDROCHLORIDE 25 MG/ML
12.5 INJECTION, SOLUTION INTRAMUSCULAR; INTRAVENOUS ONCE AS NEEDED
Status: DISCONTINUED | OUTPATIENT
Start: 2022-03-18 | End: 2022-03-18 | Stop reason: HOSPADM

## 2022-03-18 RX ORDER — ONDANSETRON 2 MG/ML
4 INJECTION INTRAMUSCULAR; INTRAVENOUS EVERY 8 HOURS PRN
Status: DISCONTINUED | OUTPATIENT
Start: 2022-03-18 | End: 2022-03-18 | Stop reason: HOSPADM

## 2022-03-18 RX ORDER — OXYCODONE HYDROCHLORIDE AND ACETAMINOPHEN 5; 325 MG/1; MG/1
1 TABLET ORAL EVERY 4 HOURS PRN
Status: DISCONTINUED | OUTPATIENT
Start: 2022-03-18 | End: 2022-03-18 | Stop reason: HOSPADM

## 2022-03-18 RX ORDER — HYDROMORPHONE HCL/PF 1 MG/ML
0.5 SYRINGE (ML) INJECTION
Status: DISCONTINUED | OUTPATIENT
Start: 2022-03-18 | End: 2022-03-18 | Stop reason: HOSPADM

## 2022-03-18 RX ORDER — BUPIVACAINE HYDROCHLORIDE 5 MG/ML
INJECTION, SOLUTION PERINEURAL AS NEEDED
Status: DISCONTINUED | OUTPATIENT
Start: 2022-03-18 | End: 2022-03-18 | Stop reason: HOSPADM

## 2022-03-18 RX ORDER — DEXAMETHASONE SODIUM PHOSPHATE 4 MG/ML
INJECTION, SOLUTION INTRA-ARTICULAR; INTRALESIONAL; INTRAMUSCULAR; INTRAVENOUS; SOFT TISSUE AS NEEDED
Status: DISCONTINUED | OUTPATIENT
Start: 2022-03-18 | End: 2022-03-18

## 2022-03-18 RX ADMIN — HYDROMORPHONE HYDROCHLORIDE 0.5 MG: 1 INJECTION, SOLUTION INTRAMUSCULAR; INTRAVENOUS; SUBCUTANEOUS at 10:00

## 2022-03-18 RX ADMIN — SODIUM CHLORIDE, SODIUM LACTATE, POTASSIUM CHLORIDE, AND CALCIUM CHLORIDE 50 ML/HR: .6; .31; .03; .02 INJECTION, SOLUTION INTRAVENOUS at 06:34

## 2022-03-18 RX ADMIN — FENTANYL CITRATE 100 MCG: 50 INJECTION, SOLUTION INTRAMUSCULAR; INTRAVENOUS at 08:12

## 2022-03-18 RX ADMIN — FENTANYL CITRATE 100 MCG: 50 INJECTION, SOLUTION INTRAMUSCULAR; INTRAVENOUS at 07:42

## 2022-03-18 RX ADMIN — ROCURONIUM BROMIDE 50 MG: 10 INJECTION, SOLUTION INTRAVENOUS at 07:45

## 2022-03-18 RX ADMIN — HYDROMORPHONE HYDROCHLORIDE 1 MG: 2 INJECTION, SOLUTION INTRAMUSCULAR; INTRAVENOUS; SUBCUTANEOUS at 09:30

## 2022-03-18 RX ADMIN — OXYCODONE HYDROCHLORIDE AND ACETAMINOPHEN 1 TABLET: 5; 325 TABLET ORAL at 11:34

## 2022-03-18 RX ADMIN — GLYCOPYRROLATE 0.4 MG: 0.2 INJECTION, SOLUTION INTRAMUSCULAR; INTRAVENOUS at 09:27

## 2022-03-18 RX ADMIN — PROPOFOL 200 MG: 10 INJECTION, EMULSION INTRAVENOUS at 07:45

## 2022-03-18 RX ADMIN — LIDOCAINE HYDROCHLORIDE 50 MG: 10 INJECTION, SOLUTION EPIDURAL; INFILTRATION; INTRACAUDAL; PERINEURAL at 07:45

## 2022-03-18 RX ADMIN — ONDANSETRON 4 MG: 2 INJECTION INTRAMUSCULAR; INTRAVENOUS at 09:09

## 2022-03-18 RX ADMIN — HYDROMORPHONE HYDROCHLORIDE 0.5 MG: 1 INJECTION, SOLUTION INTRAMUSCULAR; INTRAVENOUS; SUBCUTANEOUS at 09:40

## 2022-03-18 RX ADMIN — MIDAZOLAM 2 MG: 1 INJECTION INTRAMUSCULAR; INTRAVENOUS at 07:41

## 2022-03-18 RX ADMIN — KETOROLAC TROMETHAMINE 30 MG: 30 INJECTION, SOLUTION INTRAMUSCULAR at 09:08

## 2022-03-18 RX ADMIN — CEFAZOLIN SODIUM 2000 MG: 2 SOLUTION INTRAVENOUS at 07:30

## 2022-03-18 RX ADMIN — ROCURONIUM BROMIDE 10 MG: 10 INJECTION, SOLUTION INTRAVENOUS at 08:44

## 2022-03-18 RX ADMIN — HEPARIN SODIUM 5000 UNITS: 5000 INJECTION INTRAVENOUS; SUBCUTANEOUS at 07:56

## 2022-03-18 RX ADMIN — DEXAMETHASONE SODIUM PHOSPHATE 8 MG: 4 INJECTION, SOLUTION INTRAMUSCULAR; INTRAVENOUS at 07:51

## 2022-03-18 RX ADMIN — NEOSTIGMINE METHYLSULFATE 3 MG: 1 INJECTION INTRAVENOUS at 09:27

## 2022-03-18 RX ADMIN — HYDROMORPHONE HYDROCHLORIDE 1 MG: 2 INJECTION, SOLUTION INTRAMUSCULAR; INTRAVENOUS; SUBCUTANEOUS at 09:11

## 2022-03-18 NOTE — ANESTHESIA POSTPROCEDURE EVALUATION
Post-Op Assessment Note    CV Status:  Stable  Pain Score: 0    Pain management: adequate     Mental Status:  Alert   Hydration Status:  Stable   PONV Controlled:  Controlled   Airway Patency:  Patent      Post Op Vitals Reviewed: Yes      Staff: CRNA         No complications documented      /81 (03/18/22 0935)    Temp 97 8 °F (36 6 °C) (03/18/22 0935)    Pulse 97 (03/18/22 0935)   Resp 16 (03/18/22 0935)    SpO2 97 % (03/18/22 0935)

## 2022-03-18 NOTE — NURSING NOTE
Dr Rodriguez Antis to bedside to review discharge instructions  Pain controlled   Ready for discharge

## 2022-03-18 NOTE — OP NOTE
OPERATIVE REPORT  PATIENT NAME: Diane Estrada    :  1990  MRN: [id number]  Pt Location:  OR ROOM 07    SURGERY DATE: 3/18/2022    Surgeon(s) and Role:     * Narda Combs MD - Primary    Preop Diagnosis:  Chronic appendicitis [K36]    Post-Op Diagnosis Codes:     * Chronic appendicitis [K36]    Procedure(s) (LRB):  LAPAROSCOPIC APPENDECTOMY (N/A)    Specimen(s):  ID Type Source Tests Collected by Time Destination   1 : APPENDIX Tissue Appendix TISSUE EXAM Narda Combs MD 3/18/2022  8:59 AM        Estimated Blood Loss:   Minimal    Drains:  * No LDAs found *    Anesthesia Type:   General/LMA    Operative Indications:  Chronic appendicitis [K36]      Operative Findings:      Complications:   None    Procedure and Technique:  Patient brought the operating room postop table supine position  Under adequate general endotracheal anesthesia the abdomen was prepped and draped in usual sterile fashion  A 11  Scalpel blade was used make incision left subcostal area  The Veress needle inserted the abdomen was insufflated  Flow was poor and the needle was obviously not in the peritoneal cavity  It was repositioned and flow improved but it still did not appear to be within the abdominal cavity  There was some distention of the abdomen but was felt to be preperitoneal in position  At that point attention was placed to the umbilicus  An incision was made in this area  The Veress needle was inserted the abdomen is insufflated  Flow was much better the abdomen insufflated  The needle was removed the 10 mm port is inserted  5 mm scope was inserted the abdomen was visually explored  There was noted be some pneumatosis of some small bowel mesentery just beneath the umbilicus  There was no bleeding noted  The small bowel was intact  At that point a 5 mm port is placed in the previous left subcostal incision  This placed under direct vision    Additional ports placed in left lower quadrant an old previous laparoscopic port site  This was a 12 mm  The bowel with pneumatosis inspected noted be intact  Attention was placed to the right lower quadrant in the area of the cecum  The cecum was rotated medially and the appendix was identified  It was grasped and was quite firm  Had adhesions to the cecum along the entire length of the appendix  It was also noted to be an adhesion from the midportion of the appendix to the previous site of a right oophorectomy  The adhesion actually cause some traction to the appendix and made access to the base of the appendix easier  A window was made with the curved Metzenbaum scissors at the base of the appendix  An Endo-AMINATA 60 was passed through the 12 mm port  Was passed through the window and then closed  It was then fired amputating the appendix at the base  There was no bleeding noted the staple line  At that point the adhesion was taken with the curved Jen Shaker and of course there was bleeding noted  This was controlled with the electrocautery  There was still some slow ooze  The lateral portion of the appendix where the adhesion had been also had additional adhesions these were taken  Of course that was bleeding  This was controlled with electrocautery  The remainder of the appendix the entire length was adherent to the cecum  The appendix was rolled medially and 1 additional load of the 60 mm vascular load was passed through the 12 mm port and then closed between the cecum and the appendix  There was no cecum involved with the staple line  It was then fired and the appendix was amputated  The staple line was intact no bleeding noted  The cecum was atraumatic  That point the appendix was brought out through 12 mm port site  Sent to pathology for histological diagnosis  The area of the previous bleeding was oozing and this was irrigated with saline solution  Any bleeding points were controlled with the Bovie    After adequate hemostasis obtained the areas irrigated once again noted be intact  Any residual fluid was suction from the pelvis  One last look at the staple lines were noted be intact with no bleeding noted  Prior to closing a sponge was passed through the 12 mm port placed in the area of the bleeding and then removed  No residual bleeding was noted  At that point all CO2 and ports were removed  The port site at the umbilicus and left lower quadrant were closed with an 0 Vicryl suture  All ports were infiltrated with 0 5% Marcaine  Skin closed with 4 Monocryl in subcuticular fashion  Benzoin Steri-Strips applied  The estimated blood loss minimal patient tolerated the procedure well delivered to recovery room stable condition  Thanks       I was present for the entire procedure    Patient Disposition:  PACU       SIGNATURE: Dai Beavers MD  DATE: March 18, 2022  TIME: 4:15 PM

## 2022-03-30 ENCOUNTER — OFFICE VISIT (OUTPATIENT)
Dept: SURGERY | Facility: CLINIC | Age: 32
End: 2022-03-30

## 2022-03-30 VITALS
WEIGHT: 163 LBS | OXYGEN SATURATION: 98 % | TEMPERATURE: 98.6 F | HEART RATE: 74 BPM | HEIGHT: 66 IN | BODY MASS INDEX: 26.2 KG/M2

## 2022-03-30 DIAGNOSIS — K36 CHRONIC APPENDICITIS: Primary | ICD-10-CM

## 2022-03-30 PROCEDURE — 99024 POSTOP FOLLOW-UP VISIT: CPT | Performed by: SPECIALIST

## 2022-03-30 NOTE — PROGRESS NOTES
Tracy Cuevas presents today for follow-up visit status post laparoscopic appendectomy for chronic appendicitis  Today in the office she says the right lower quadrant pain is significantly improved  She says she has an occasional twinge but other than that she feels fine  She does admit to being a little bloated since the surgery  Must keep in mind that she had 2 laparoscopic procedures within 2 weeks of each other in the same general vicinity of the body  In view of this this is not unusual     She is tolerating her diet  Her bowels are moving  Her path is still pending   ? Physical exam:  Young adult white female awake alert no distress    Abdomen flat firm multiple small scars are noted that are healed  She has 3 laparoscopic port site incisions that are fresher than the others  One of the umbilicus into in the left side  No hernias no infection  Excellent cosmetic result  Impression:  Doing quite well status post laparoscopic appendectomy for presumed chronic appendicitis  Symptomatically improved significantly  Await final path and any surprises that may arise from this  Plan: At this point she is discharged from the office  She is symptomatically better  No news is good news in regards to the path  We will let her know once the path is back  Once again the bloating will improve over the next several days

## 2022-05-27 ENCOUNTER — PATIENT MESSAGE (OUTPATIENT)
Dept: SURGERY | Facility: CLINIC | Age: 32
End: 2022-05-27

## 2022-12-27 ENCOUNTER — OFFICE VISIT (OUTPATIENT)
Dept: URGENT CARE | Facility: CLINIC | Age: 32
End: 2022-12-27

## 2022-12-27 VITALS
HEIGHT: 66 IN | TEMPERATURE: 99.4 F | HEART RATE: 98 BPM | BODY MASS INDEX: 27.32 KG/M2 | RESPIRATION RATE: 18 BRPM | OXYGEN SATURATION: 99 % | WEIGHT: 170 LBS

## 2022-12-27 DIAGNOSIS — J01.40 ACUTE NON-RECURRENT PANSINUSITIS: Primary | ICD-10-CM

## 2022-12-27 RX ORDER — AZELASTINE 1 MG/ML
1 SPRAY, METERED NASAL 2 TIMES DAILY
Qty: 1 ML | Refills: 0 | Status: SHIPPED | OUTPATIENT
Start: 2022-12-27

## 2022-12-27 RX ORDER — DOXYCYCLINE 100 MG/1
100 CAPSULE ORAL 2 TIMES DAILY
Qty: 14 CAPSULE | Refills: 0 | Status: SHIPPED | OUTPATIENT
Start: 2022-12-27 | End: 2023-01-03

## 2022-12-27 RX ORDER — ALBUTEROL SULFATE 90 UG/1
2 AEROSOL, METERED RESPIRATORY (INHALATION) EVERY 6 HOURS PRN
Qty: 8 G | Refills: 0 | Status: SHIPPED | OUTPATIENT
Start: 2022-12-27

## 2022-12-27 RX ORDER — METHYLPREDNISOLONE 4 MG/1
TABLET ORAL
Qty: 21 EACH | Refills: 0 | Status: SHIPPED | OUTPATIENT
Start: 2022-12-27

## 2022-12-27 NOTE — PROGRESS NOTES
[facility]        NAME: Liz Blanca is a 28 y o  female  : 1990    MRN: [id number]  DATE: 2022  TIME: 1:38 PM    Assessment and Plan   Acute non-recurrent pansinusitis [J01 40]  1  Acute non-recurrent pansinusitis  doxycycline monohydrate (MONODOX) 100 mg capsule    methylPREDNISolone 4 MG tablet therapy pack    azelastine (ASTELIN) 0 1 % nasal spray    albuterol (PROVENTIL HFA,VENTOLIN HFA) 90 mcg/act inhaler            Patient Instructions     Take antibiotic as directed until completed  Medrol as directed until completed  Additional medications as directed for symptomatic relief as needed  Return and or Tylenol as needed for fever and pain  Follow up with PCP in 3-5 days  Proceed to  ER if symptoms worsen  Chief Complaint     Chief Complaint   Patient presents with   • Sinusitis     Pt presents with nasal congestion, left ear pain and fullness, post nasal drip x 5 weeks  Pt states she has been on a round of Zithromax and Amoxicillin  Pt states she feels that the medications have not helped her  History of Present Illness       28-year-old female presents with 5 weeks of sinus pain pressure congestion in left ear fullness  Symptoms have been waxing waning  Patient reports she has had around of Zithromax and amoxicillin which has not improved her symptoms  Denies any chest pain shortness of breath  No abdominal pain nausea vomiting or diarrhea  Earache   There is pain in the left ear  This is a recurrent problem  The current episode started 1 to 4 weeks ago  The problem occurs constantly  The problem has been gradually worsening  The maximum temperature recorded prior to her arrival was 100 4 - 100 9 F  The fever has been present for less than 1 day  The pain is at a severity of 5/10  Associated symptoms include coughing, headaches, rhinorrhea and a sore throat   Pertinent negatives include no abdominal pain, diarrhea, ear discharge, hearing loss, neck pain, rash or vomiting  She has tried antibiotics, NSAIDs, heat packs, cold packs and acetaminophen for the symptoms  The treatment provided no relief  Sinusitis  This is a new problem  The current episode started more than 1 month ago  The problem has been waxing and waning since onset  There has been no fever  The fever has been present for less than 1 day  Her pain is at a severity of 5/10  Associated symptoms include congestion, coughing, ear pain, headaches, sinus pressure and a sore throat  Pertinent negatives include no neck pain or sneezing  Past treatments include antibiotics, oral decongestants, sitting up, saline sprays and lying down  The treatment provided no relief  Review of Systems   Review of Systems   Constitutional: Positive for fatigue  Negative for fever  HENT: Positive for congestion, ear pain, rhinorrhea, sinus pressure and sore throat  Negative for ear discharge, hearing loss and sneezing  Eyes: Negative  Respiratory: Positive for cough  Cardiovascular: Negative  Gastrointestinal: Negative  Negative for abdominal pain, diarrhea and vomiting  Musculoskeletal: Negative  Negative for neck pain  Skin: Negative  Negative for rash  Neurological: Positive for headaches           Current Medications       Current Outpatient Medications:   •  albuterol (PROVENTIL HFA,VENTOLIN HFA) 90 mcg/act inhaler, Inhale 2 puffs every 6 (six) hours as needed for wheezing or shortness of breath, Disp: 8 g, Rfl: 0  •  azelastine (ASTELIN) 0 1 % nasal spray, 1 spray into each nostril 2 (two) times a day Use in each nostril as directed, Disp: 1 mL, Rfl: 0  •  cyclobenzaprine (FLEXERIL) 5 mg tablet, Take 5 mg by mouth 3 (three) times a day as needed for muscle spasms, Disp: , Rfl:   •  doxycycline monohydrate (MONODOX) 100 mg capsule, Take 1 capsule (100 mg total) by mouth 2 (two) times a day for 7 days, Disp: 14 capsule, Rfl: 0  •  drospirenone-ethinyl estradiol (PETE) 3-0 02 MG per tablet, Take 1 tablet by mouth daily, Disp: , Rfl:   •  ibuprofen (MOTRIN) 800 mg tablet, Take 800 mg by mouth every 6 (six) hours as needed for mild pain, Disp: , Rfl:   •  methylPREDNISolone 4 MG tablet therapy pack, Use as directed on package, Disp: 21 each, Rfl: 0    Current Allergies     Allergies as of 12/27/2022   • (No Known Allergies)            The following portions of the patient's history were reviewed and updated as appropriate: allergies, current medications, past family history, past medical history, past social history, past surgical history and problem list      Past Medical History:   Diagnosis Date   • Abdominal discomfort    • Abnormal Pap smear of cervix    • Anxiety    • Arthritis    • CFS (chronic fatigue syndrome)    • COVID-19 12/2021   • Endometriosis    • Endometritis    • Epigastric pain    • Fibromyalgia    • GERD (gastroesophageal reflux disease)    • IBS (irritable bowel syndrome)    • Major depressive disorder    • Myalgia of pelvic floor    • Pelvic pain    • PONV (postoperative nausea and vomiting)    • Psoriatic arthritis (HCC)    • Shortness of breath     exacerbated with rib pain   • Sphincter of Oddi dysfunction    • Urinary retention        Past Surgical History:   Procedure Laterality Date   • ABDOMINAL SURGERY     • APPENDECTOMY LAPAROSCOPIC N/A 3/18/2022    Procedure: LAPAROSCOPIC APPENDECTOMY;  Surgeon: Parish Andrade MD;  Location: [address] [place] [address];  Service: General   • CHOLECYSTECTOMY     • DIAGNOSTIC LAPAROSCOPY     • EGD AND COLONOSCOPY     • ERCP     • ESOPHAGOGASTRODUODENOSCOPY N/A 11/28/2017    Procedure: ESOPHAGOGASTRODUODENOSCOPY (EGD); Surgeon: Janet Rodriguez MD;  Location:  GI LAB;   Service: Gastroenterology   • LAPAROSCOPIC CHOLECYSTECTOMY     • LAPAROSCOPY      for endometriosis   • WISDOM TOOTH EXTRACTION         Family History   Problem Relation Age of Onset   • No Known Problems Mother    • No Known Problems Father    • Alzheimer's disease Maternal Grandmother    • Esophageal cancer Maternal Grandfather    • Depression Paternal Grandmother    • Suicidality Paternal Grandmother    • No Known Problems Paternal Grandfather          Medications have been verified  Objective   Pulse 98   Temp 99 4 °F (37 4 °C)   Resp 18   Ht 5' 6" (1 676 m)   Wt 77 1 kg (170 lb)   SpO2 99%   BMI 27 44 kg/m²   No LMP recorded  Physical Exam     Physical Exam  Vitals and nursing note reviewed  Constitutional:       General: She is not in acute distress  Appearance: Normal appearance  She is well-developed  HENT:      Head: Normocephalic and atraumatic  Right Ear: Hearing, ear canal and external ear normal  A middle ear effusion is present  There is no impacted cerumen  Left Ear: Hearing, ear canal and external ear normal  A middle ear effusion is present  There is no impacted cerumen  Nose: Congestion and rhinorrhea present  Right Sinus: Maxillary sinus tenderness and frontal sinus tenderness present  Left Sinus: Maxillary sinus tenderness and frontal sinus tenderness present  Mouth/Throat:      Pharynx: Uvula midline  No oropharyngeal exudate or posterior oropharyngeal erythema  Eyes:      General:         Right eye: No discharge  Left eye: No discharge  Conjunctiva/sclera: Conjunctivae normal    Cardiovascular:      Rate and Rhythm: Normal rate and regular rhythm  Heart sounds: Normal heart sounds  No murmur heard  Pulmonary:      Effort: Pulmonary effort is normal  No respiratory distress  Breath sounds: Normal breath sounds  No wheezing or rales  Abdominal:      General: Bowel sounds are normal       Palpations: Abdomen is soft  Tenderness: There is no abdominal tenderness  Musculoskeletal:         General: Normal range of motion  Cervical back: Normal range of motion and neck supple  Lymphadenopathy:      Cervical: No cervical adenopathy  Skin:     General: Skin is warm and dry     Neurological: Mental Status: She is alert and oriented to person, place, and time     Psychiatric:         Mood and Affect: Mood normal

## 2022-12-27 NOTE — PATIENT INSTRUCTIONS
Take antibiotic as directed until completed  Medrol as directed until completed  Additional medications as directed for symptomatic relief as needed  Return and or Tylenol as needed for fever and pain  Follow up with PCP in 3-5 days  Proceed to  ER if symptoms worsen  Sinusitis   AMBULATORY CARE:   Sinusitis  is inflammation or infection of your sinuses  Sinusitis is most often caused by a virus  Acute sinusitis may last up to 12 weeks  Chronic sinusitis lasts longer than 12 weeks  Recurrent sinusitis means you have 4 or more infections in 1 year  Common signs and symptoms:   Fever    Pain, pressure, redness, or swelling around the forehead, cheeks, or eyes    Thick yellow or green discharge from your nose    Tenderness when you touch your face over your sinuses    Dry cough that happens mostly at night or when you lie down    Headache and face pain that is worse when you lean forward    Tooth pain, or pain when you chew    Seek care immediately if:   You have trouble breathing or wheezing that is getting worse  You have a stiff neck, a fever, or a bad headache  You cannot open your eye  Your eyeball bulges out or you cannot move your eye  You are more sleepy than normal, or you notice changes in your ability to think, move, or talk  You have swelling of your forehead or scalp  Call your doctor if:   You have vision changes, such as double vision  Your eye and eyelid are red, swollen, and painful  Your symptoms do not improve or go away after 10 days  You have nausea and are vomiting  Your nose is bleeding  You have questions or concerns about your condition or care  Medicines: Your symptoms may go away on their own  Your healthcare provider may recommend watchful waiting for up to 10 days before starting antibiotics  You may need any of the following:  Acetaminophen  decreases pain and fever  It is available without a doctor's order   Ask how much to take and how often to take it  Follow directions  Read the labels of all other medicines you are using to see if they also contain acetaminophen, or ask your doctor or pharmacist  Acetaminophen can cause liver damage if not taken correctly  Do not use more than 4 grams (4,000 milligrams) total of acetaminophen in one day  NSAIDs , such as ibuprofen, help decrease swelling, pain, and fever  This medicine is available with or without a doctor's order  NSAIDs can cause stomach bleeding or kidney problems in certain people  If you take blood thinner medicine, always ask your healthcare provider if NSAIDs are safe for you  Always read the medicine label and follow directions  Nasal steroid sprays  may help decrease inflammation in your nose and sinuses  Decongestants  help reduce swelling and drain mucus in the nose and sinuses  They may help you breathe easier  Antihistamines  help dry mucus in the nose and relieve sneezing  Antibiotics  help treat or prevent a bacterial infection  Self-care:   Rinse your sinuses as directed  Use a sinus rinse device to rinse your nasal passages with a saline (salt water) solution or distilled water  Do not use tap water  This will help thin the mucus in your nose and rinse away pollen and dirt  It will also help reduce swelling so you can breathe normally  Use a humidifier  to increase air moisture in your home  This may make it easier for you to breathe and help decrease your cough  Sleep with your head elevated  Place an extra pillow under your head before you go to sleep to help your sinuses drain  Drink liquids as directed  Ask your healthcare provider how much liquid to drink each day and which liquids are best for you  Liquids will thin the mucus in your nose and help it drain  Avoid drinks that contain alcohol or caffeine  Do not smoke, and avoid secondhand smoke  Nicotine and other chemicals in cigarettes and cigars can make your symptoms worse   Ask your healthcare provider for information if you currently smoke and need help to quit  E-cigarettes or smokeless tobacco still contain nicotine  Talk to your healthcare provider before you use these products  Prevent the spread of germs:   Wash your hands often with soap and water  Wash your hands after you use the bathroom, change a child's diaper, or sneeze  Wash your hands before you prepare or eat food  Stay away from people who are sick  Some germs spread easily and quickly through contact  Follow up with your doctor as directed: You may be referred to an ear, nose, and throat specialist  Write down your questions so you remember to ask them during your visits  © Copyright [employer] 2022 Information is for End User's use only and may not be sold, redistributed or otherwise used for commercial purposes  All illustrations and images included in CareNotes® are the copyrighted property of A D A M , Inc  or [employer]   The above information is an  only  It is not intended as medical advice for individual conditions or treatments  Talk to your doctor, nurse or pharmacist before following any medical regimen to see if it is safe and effective for you  Fluid In The Ear (Serous Otitis Media)   AMBULATORY CARE:   Serous otitis media [facility])  is fluid trapped in the middle of your ear behind your eardrum  This condition usually develops without signs or symptoms of an ear infection  Serous otitis media may be caused by an upper respiratory infection or allergies  It is most common in the fall and early spring  Signs and symptoms:   Trouble hearing    Sounds are muffled    Plugged ear or an ear that feels full    Ear discomfort or popping    Ringing or buzzing in your ear    Call your doctor if:   You develop severe ear pain  The outside of your ear is red or swollen  You have fluid coming from your ear      You have questions or concerns about your condition or care     Medicines: You may need any of the following:  Acetaminophen  decreases pain and fever  It is available without a doctor's order  Ask how much to take and how often to take it  Follow directions  Read the labels of all other medicines you are using to see if they also contain acetaminophen, or ask your doctor or pharmacist  Acetaminophen can cause liver damage if not taken correctly  Do not use more than 4 grams (4,000 milligrams) total of acetaminophen in one day  NSAIDs , such as ibuprofen, help decrease swelling, pain, and fever  This medicine is available with or without a doctor's order  NSAIDs can cause stomach bleeding or kidney problems in certain people  If you take blood thinner medicine, always ask your healthcare provider if NSAIDs are safe for you  Always read the medicine label and follow directions  Steroids  help decrease inflammation so the fluid can drain from your ear  Antibiotics  may be needed if a bacterial infection caused your FABRICIO  How to stay healthy:   Wash your hands often throughout the day  Use soap and water  Rub your soapy hands together, lacing your fingers, for at least 20 seconds  Rinse with warm, running water  Dry your hands with a clean towel or paper towel  Use hand  that contains alcohol if soap and water are not available  Teach children how to wash their hands and use hand   Avoid people who are sick  Some germs are easily and quickly spread through contact  Follow up with your doctor as directed:  Write down your questions so you remember to ask them during your visits  © Copyright [employer] 2022 Information is for End User's use only and may not be sold, redistributed or otherwise used for commercial purposes  All illustrations and images included in CareNotes® are the copyrighted property of [employer]  or Renee Montane[employer]  The above information is an  only   It is not intended as medical advice for individual conditions or treatments  Talk to your doctor, nurse or pharmacist before following any medical regimen to see if it is safe and effective for you

## 2023-09-19 ENCOUNTER — TELEPHONE (OUTPATIENT)
Dept: SURGERY | Facility: CLINIC | Age: 33
End: 2023-09-19

## 2023-09-19 NOTE — TELEPHONE ENCOUNTER
Note to chart:    Pt has [place] ins. Called the ins co on 9/11/23 and was told Dr Jevon Valadez is an in network provider. Said pt's ins was active effec 10/2/2022 and is current. Ref # for call is [id number] (Spoke w/Kim at ins co). Said prior [employer] is usually necessary. Possible CPT code is 31 41 19, possible Diag code is L73.2, but must wait for pt to come in. Note:  Dr Jevon Valadez said she does perform the Hidradenitis Suppurativa procedure, and it doesn't need to be sent to plastic surgery (Plastic surgeons Mayr Caban and Loyd Mcgovern referred case to Dr Jevon Valadez.)    Ppwk from Mary Mcgovern will be scanned into the pt's chart for her upcoming 9/25/2023 appt w/Dr Nelson.

## 2023-09-21 NOTE — PROGRESS NOTES
Assessment/Plan:   Elisha Oquendo is a 35 y. o.female who is here for   Chief Complaint   Patient presents with   • hidradenitis      R groin. It has been coming and going every 3 weeks since May 2023. Presented for hidradenitis. Recently in acute care for right inner thigh bikini line hard reddened lump. This was drained. Packing was not placed. Cultures not obtained. Patient was discharged. Patient is on chronic doxycyline since may to keep this from flaring. She also gets steroid infections about every three weeks with dermatology. Previous history of hidradenitis only on this area, none on axilla or right groin. On exam found to have hydradenitis of the right upper medial thigh at bikini line. Plan: Excise lesion(s) under anesthesia in the operating room. Patient is aware this can recur and need another excision. This can also cause delayed wound healing. Positioning: supine - right fog leg    Post Op Pain Management:   Motrin and Tylenol    - Patient has been instructed to avoid herbs or non-directed vitamins the week prior to surgery to ensure no drug interactions with perioperative surgical and anesthetic medications. - Patient should continue beta-blocker medication up through and including the day of surgery but hold any other hypertensive medications, including diuretics, unless instructed by PCP or anesthesia  - Patient should continue his statin medication up through and including the day of surgery.  - Hold metformin , If on this medication, the morning of surgery and do not resume until 48 hours AFTER surgery to avoid risk of lactic acidosis. Do not resume if eGFR is < 30  - Insulin Management:If on Insulin, patient advised to call PCP for explicit instructions. In general, will need to take one-half normal dose am of surgery but pt advised to consult PCP before making any changes.    - Patient has been instructed to avoid aspirin containing medications or non-steroidal anti-inflammatory drugs for SEVEN days preceding surgery. Preoperative Clearance: None    In preparation for this visit all relevant known prior office notes, prior consultations, emergency room visits, blood work results, and imaging studies were personally reviewed. A total of  30 minutes was spent reviewing all of this information, caring for this patient, providing differential diagnosis, instructions for management, counseling and coordination of care. This also includes planning surgical intervention where indicated. Patient was discussed and asked her medication list.    Discussion was held regarding anticoagulation, aspirin and Motrin use prior to surgery. Most anticoagulation needs to stop 7 to 10 days prior to surgery. Some can be stopped at 48 hours. Patient has had all questions answered by the physician or physician representative regarding anticoagulation. Patient is aware that they need to stop their anticoagulation preoperatively. Discussion was held regarding weight loss medication and diabetic medication with that also is used for weight loss. These medications have significant perioperative risk. He has medications need to be stopped 7 to 10 days prior to surgery. Patient understands and is aware they need to stop these type of medications preoperatively. Patient has been given a list of anticoagulation and/or weight loss/diabetes medications that would be affected by this and they have confirmed the are either not on these drugs or have understood their directions to stop them at least 1 week prior to surgery or as instructed. Most herbal medication should be discontinued a week to 10 days prior to surgery. Diabetic medication such as insulin should be discussed with her primary care physician or the physician that ordered the insulin or similar medications.   In general patients usually half their diabetic insulin the morning of the surgery but again this should be discussed with the physician. Patient understands and agrees to this aforementioned protocol.          _______________________________________________________  CC:hidradenitis  (R groin. It has been coming and going every 3 weeks since May 2023.)  . HPI:  Dorita Holcomb is a 35 y. o.female who was referred for evaluation of hidradenitis  (R groin. It has been coming and going every 3 weeks since May 2023.)  . Currently patient reports since may has had right groin/thigh hydradenitis comes and goes. She does get steroid injections from dermatology. She ahs been taking doxycycline orally since may to help decrease this. Recently in acute care for right inner thigh bikini line hard reddened lump. This was drained. Packing was not placed. Cultures not obtained. Patient was discharged. Patient is on chronic doxycyline since may to keep this from flaring. She also gets steroid infections about every three weeks with dermatology    Reports: draining, infected and today it is closed and not painful    Location: right upper medial thigh near bikini line      ROS:  General ROS: negative  negative for - chills, fatigue, fever or night sweats, weight loss  Respiratory ROS: no cough, shortness of breath, or wheezing  Cardiovascular ROS: no chest pain or dyspnea on exertion  Genito-Urinary ROS: no dysuria, trouble voiding, or hematuria  Musculoskeletal ROS: negative for - gait disturbance, joint pain or muscle pain  Neurological ROS: no TIA or stroke symptoms  Skin ROS: See HPI  GI ROS: see HPI  Skin ROS: no new rashes or lesions   Lymphatic ROS: no new adenopathy noted by pt.    GYN ROS: see HPI, no new GYN history or bleeding noted  Psy ROS: no new mental or behavioral disturbances       Patient Active Problem List   Diagnosis   • Epigastric pain   • Psoriatic arthritis ([address])   • Endometriosis   • Fibromyalgia muscle pain   • History of cholecystectomy   • Intractable abdominal pain   • Drug-seeking behavior   • Chronic eczematous otitis externa of left ear   • ETD (Eustachian tube dysfunction), left   • PONV (postoperative nausea and vomiting)         Allergies:  Patient has no known allergies.       Current Outpatient Medications:   •  clindamycin (CLEOCIN T) 1 % lotion, APPLY A THIN LAYER TO AFFECTED AREAS USE TWICE A DAY, Disp: , Rfl:   •  cyclobenzaprine (FLEXERIL) 5 mg tablet, Take 5 mg by mouth 3 (three) times a day as needed for muscle spasms, Disp: , Rfl:   •  doxycycline (PERIOSTAT) 20 MG tablet, , Disp: , Rfl:   •  drospirenone-ethinyl estradiol (PETE) 3-0.02 MG per tablet, Take 1 tablet by mouth daily, Disp: , Rfl:   •  ibuprofen (MOTRIN) 800 mg tablet, Take 800 mg by mouth every 6 (six) hours as needed for mild pain, Disp: , Rfl:   •  ketoconazole (NIZORAL) 2 % cream, APPLY TO THE FEET 2 TIMES A DAY FOR 6 WEEKS UNTIL CLEAR, Disp: , Rfl:   •  triamcinolone (KENALOG) 0.1 % ointment, , Disp: , Rfl:   •  doxycycline hyclate (VIBRAMYCIN) 100 mg capsule, TAKE 1 CAPSULE BY MOUTH TWICE A DAY WITH MEALS (Patient not taking: Reported on 9/25/2023), Disp: , Rfl:     Past Medical History:   Diagnosis Date   • Abdominal discomfort    • Abnormal Pap smear of cervix    • Anxiety    • Arthritis    • CFS (chronic fatigue syndrome)    • COVID-19 12/2021   • Endometriosis    • Endometritis    • Epigastric pain    • Fibromyalgia    • GERD (gastroesophageal reflux disease)    • IBS (irritable bowel syndrome)    • Major depressive disorder    • Myalgia of pelvic floor    • Pelvic pain    • PONV (postoperative nausea and vomiting)    • Psoriatic arthritis ([facility]CC)    • Shortness of breath     exacerbated with rib pain   • Sphincter of Oddi dysfunction    • Urinary retention        Past Surgical History:   Procedure Laterality Date   • ABDOMINAL SURGERY     • APPENDECTOMY LAPAROSCOPIC N/A 3/18/2022    Procedure: LAPAROSCOPIC APPENDECTOMY;  Surgeon: Pamela Tubbs MD;  Location:  MAIN OR;  Service: General   • CHOLECYSTECTOMY     • DIAGNOSTIC LAPAROSCOPY     • EGD AND COLONOSCOPY     • ERCP     • ESOPHAGOGASTRODUODENOSCOPY N/A 11/28/2017    Procedure: ESOPHAGOGASTRODUODENOSCOPY (EGD); Surgeon: Zuly Katz MD;  Location: [facility]; Service: Gastroenterology   • LAPAROSCOPIC CHOLECYSTECTOMY     • LAPAROSCOPY      for endometriosis   • WISDOM TOOTH EXTRACTION         Family History   Problem Relation Age of Onset   • No Known Problems Mother    • No Known Problems Father    • Alzheimer's disease Maternal Grandmother    • Esophageal cancer Maternal Grandfather    • Depression Paternal Grandmother    • Suicidality Paternal Grandmother    • No Known Problems Paternal Grandfather         reports that she has been smoking cigarettes. She has a 1.25 pack-year smoking history. She has never used smokeless tobacco. She reports current alcohol use of about 1.0 standard drink of alcohol per week. She reports current drug use. Drug: Marijuana. Vitals:    09/25/23 1516   BP: 150/100   Pulse: 98   Temp: 100 °F (37.8 °C)        PHYSICAL EXAM  General Appearance:    Alert, cooperative, no distress,    Head:    Normocephalic without obvious abnormality   Eyes:    PERRL, conjunctiva/corneas clear     Neck:   Supple, no adenopathy, no JVD   Back:     Symmetric, no spinal or CVA tenderness   Lungs:     Clear to auscultation bilaterally, no wheezing or rhonchi   Heart:    Regular rate and rhythm, S1 and S2 normal, no murmur   Abdomen:         Extremities:   Extremities normal. No clubbing, cyanosis or edema   Psych:   Normal Affect, AOx3. Neurologic:  Skin:   CNII-XII intact. Strength symmetric, speech intact    Warm, dry, intact, no visible rashes or lesions except as follows: There is a 2-3 cm area on the right upper medial thigh that is closed, no opening, no flaring, no draining. Some portions of this record may have been generated with voice recognition software. There may be translation, syntax,  or grammatical errors.  Occasional wrong word or "sound-a-like" substitutions may have occurred due to the inherent limitations of the voice recognition software. Read the chart carefully and recognize, using context, where substitutions may have occurred. If you have any questions, please contact the dictating provider for clarification or correction, as needed. This encounter has been coded by a non-certified coder.        Josse Goncalves PA-C    Date: 9/25/2023 Time: 3:57 PM

## 2023-09-25 ENCOUNTER — OFFICE VISIT (OUTPATIENT)
Dept: SURGERY | Facility: CLINIC | Age: 33
End: 2023-09-25
Payer: COMMERCIAL

## 2023-09-25 VITALS
WEIGHT: 172 LBS | TEMPERATURE: 100 F | HEART RATE: 98 BPM | HEIGHT: 66 IN | DIASTOLIC BLOOD PRESSURE: 100 MMHG | SYSTOLIC BLOOD PRESSURE: 150 MMHG | BODY MASS INDEX: 27.64 KG/M2

## 2023-09-25 DIAGNOSIS — L73.2 HYDRADENITIS: Primary | ICD-10-CM

## 2023-09-25 PROCEDURE — 99203 OFFICE O/P NEW LOW 30 MIN: CPT | Performed by: PHYSICIAN ASSISTANT

## 2023-09-25 RX ORDER — KETOCONAZOLE 20 MG/G
CREAM TOPICAL
COMMUNITY
Start: 2023-06-29

## 2023-09-25 RX ORDER — CLINDAMYCIN PHOSPHATE 10 UG/ML
LOTION TOPICAL
COMMUNITY
Start: 2023-07-26

## 2023-09-25 RX ORDER — TRIAMCINOLONE ACETONIDE 1 MG/G
OINTMENT TOPICAL
COMMUNITY
Start: 2023-05-18

## 2023-09-25 RX ORDER — DOXYCYCLINE HYCLATE 20 MG
TABLET ORAL
COMMUNITY
Start: 2023-09-14

## 2023-09-25 RX ORDER — DOXYCYCLINE HYCLATE 100 MG/1
CAPSULE ORAL
COMMUNITY
Start: 2023-06-27

## 2023-09-27 ENCOUNTER — PREP FOR PROCEDURE (OUTPATIENT)
Dept: SURGERY | Facility: CLINIC | Age: 33
End: 2023-09-27

## 2023-09-27 DIAGNOSIS — L73.2 HIDRADENITIS: Primary | ICD-10-CM

## 2023-10-17 NOTE — PROGRESS NOTES
Patient refused scheduled EGD due to wanting to speak to new consulted GI Dr for second opinion  GI lab made aware  Will continue to monitor  yes

## 2023-10-24 NOTE — PROGRESS NOTES
Assessment/Plan:   Maria De Jesus Castelan is a 35 y. o.female who is here for   Chief Complaint   Patient presents with   • Pre-op Exam     EXCISION HIDRADENITIS GROIN. No changes since last visit. Presented for hidradenitis. Recently in acute care for right inner thigh bikini line hard reddened lump. This was drained. Packing was not placed. Cultures not obtained. Patient was discharged. Patient is on chronic doxycyline since may to keep this from flaring. She also gets steroid infections about every three weeks with dermatology. Previous history of hidradenitis only on this area, none on axilla or right groin. On exam found to have hydradenitis of the right upper medial thigh at bikini line. Plan: Excise lesion(s) under anesthesia in the operating room. Patient is aware this can recur and need another excision. This can also cause delayed wound healing. Positioning: supine - right frog leg      Patient was adamantly and strongly encouraged to stop smoking. This will never get better if she does not stop smoking. There is no guarantee it will get better if she does stop smoking but I guarantee it will never resolve and will continue to recur if she continues to smoke. This was made very clear and she expressed an understanding. Post Op Pain Management:   Motrin and Tylenol    - Patient has been instructed to avoid herbs or non-directed vitamins the week prior to surgery to ensure no drug interactions with perioperative surgical and anesthetic medications.   - Patient should continue beta-blocker medication up through and including the day of surgery but hold any other hypertensive medications, including diuretics, unless instructed by PCP or anesthesia  - Patient should continue his statin medication up through and including the day of surgery.  - Hold metformin , If on this medication, the morning of surgery and do not resume until 48 hours AFTER surgery to avoid risk of lactic acidosis. Do not resume if eGFR is < 30  - Insulin Management:If on Insulin, patient advised to call PCP for explicit instructions. In general, will need to take one-half normal dose am of surgery but pt advised to consult PCP before making any changes. - Patient has been instructed to avoid aspirin containing medications or non-steroidal anti-inflammatory drugs for SEVEN days preceding surgery. Preoperative Clearance: None    In preparation for this visit all relevant known prior office notes, prior consultations, emergency room visits, blood work results, and imaging studies were personally reviewed. A total of  30 minutes was spent reviewing all of this information, caring for this patient, providing differential diagnosis, instructions for management, counseling and coordination of care. This also includes planning surgical intervention where indicated. Patient was discussed and asked her medication list.    Discussion was held regarding anticoagulation, aspirin and Motrin use prior to surgery. Most anticoagulation needs to stop 7 to 10 days prior to surgery. Some can be stopped at 48 hours. Patient has had all questions answered by the physician or physician representative regarding anticoagulation. Patient is aware that they need to stop their anticoagulation preoperatively. Discussion was held regarding weight loss medication and diabetic medication with that also is used for weight loss. These medications have significant perioperative risk. He has medications need to be stopped 7 to 10 days prior to surgery. Patient understands and is aware they need to stop these type of medications preoperatively. Patient has been given a list of anticoagulation and/or weight loss/diabetes medications that would be affected by this and they have confirmed the are either not on these drugs or have understood their directions to stop them at least 1 week prior to surgery or as instructed.     Most herbal medication should be discontinued a week to 10 days prior to surgery. Diabetic medication such as insulin should be discussed with her primary care physician or the physician that ordered the insulin or similar medications. In general patients usually half their diabetic insulin the morning of the surgery but again this should be discussed with the physician. Patient understands and agrees to this aforementioned protocol.          _______________________________________________________  CC:Pre-op Exam (EXCISION HIDRADENITIS GROIN. No changes since last visit. )  . HPI:  Angel Gill is a 35 y. o.female who was referred for evaluation of Pre-op Exam (EXCISION HIDRADENITIS GROIN. No changes since last visit. )  . Currently patient reports since may has had right groin/thigh hydradenitis comes and goes. She does get steroid injections from dermatology. She ahs been taking doxycycline orally since may to help decrease this. Recently in acute care for right inner thigh bikini line hard reddened lump. This was drained. Packing was not placed. Cultures not obtained. Patient was discharged. Patient is on chronic doxycyline since may to keep this from flaring. She also gets steroid infections about every three weeks with dermatology    Reports: draining, infected and today it is closed and not painful    Location: right upper medial thigh near bikini line      ROS:  General ROS: negative  negative for - chills, fatigue, fever or night sweats, weight loss  Respiratory ROS: no cough, shortness of breath, or wheezing  Cardiovascular ROS: no chest pain or dyspnea on exertion  Genito-Urinary ROS: no dysuria, trouble voiding, or hematuria  Musculoskeletal ROS: negative for - gait disturbance, joint pain or muscle pain  Neurological ROS: no TIA or stroke symptoms  Skin ROS: See HPI  GI ROS: see HPI  Skin ROS: no new rashes or lesions   Lymphatic ROS: no new adenopathy noted by pt.    GYN ROS: see HPI, no new GYN history or bleeding noted  Psy ROS: no new mental or behavioral disturbances       Patient Active Problem List   Diagnosis   • Epigastric pain   • Psoriatic arthritis ([address])   • Endometriosis   • Fibromyalgia muscle pain   • History of cholecystectomy   • Intractable abdominal pain   • Drug-seeking behavior   • Chronic eczematous otitis externa of left ear   • ETD (Eustachian tube dysfunction), left   • PONV (postoperative nausea and vomiting)         Allergies:  Patient has no known allergies.       Current Outpatient Medications:   •  clindamycin (CLEOCIN T) 1 % lotion, APPLY A THIN LAYER TO AFFECTED AREAS USE TWICE A DAY, Disp: , Rfl:   •  cyclobenzaprine (FLEXERIL) 5 mg tablet, Take 5 mg by mouth 3 (three) times a day as needed for muscle spasms, Disp: , Rfl:   •  doxycycline (PERIOSTAT) 20 MG tablet, daily, Disp: , Rfl:   •  drospirenone-ethinyl estradiol (PETE) 3-0.02 MG per tablet, Take 1 tablet by mouth daily, Disp: , Rfl:   •  ibuprofen (MOTRIN) 800 mg tablet, Take 800 mg by mouth every 6 (six) hours as needed for mild pain, Disp: , Rfl:   •  triamcinolone (KENALOG) 0.1 % ointment, daily as needed, Disp: , Rfl:   •  ketoconazole (NIZORAL) 2 % cream, APPLY TO THE FEET 2 TIMES A DAY FOR 6 WEEKS UNTIL CLEAR (Patient not taking: Reported on 11/2/2023), Disp: , Rfl:     Past Medical History:   Diagnosis Date   • Abdominal discomfort    • Abnormal Pap smear of cervix    • Anxiety    • Arthritis    • CFS (chronic fatigue syndrome)    • COVID-19 12/2021   • Endometriosis    • Endometritis    • Epigastric pain    • Fibromyalgia     denies   • GERD (gastroesophageal reflux disease)    • IBS (irritable bowel syndrome)    • Major depressive disorder    • Myalgia of pelvic floor    • Pelvic pain    • PONV (postoperative nausea and vomiting)    • Psoriatic arthritis (HCC)     denies   • Shortness of breath     exacerbated with rib pain   • Sphincter of Oddi dysfunction    • Urinary retention     denies       Past Surgical History:   Procedure Laterality Date   • ABDOMINAL SURGERY     • APPENDECTOMY LAPAROSCOPIC N/A 3/18/2022    Procedure: LAPAROSCOPIC APPENDECTOMY;  Surgeon: Parish Estrada MD;  Location: [facility] OR;  Service: General   • CHOLECYSTECTOMY     • DIAGNOSTIC LAPAROSCOPY     • EGD AND COLONOSCOPY     • ERCP     • ESOPHAGOGASTRODUODENOSCOPY N/A 11/28/2017    Procedure: ESOPHAGOGASTRODUODENOSCOPY (EGD); Surgeon: Tiffany Corcoran MD;  Location:  GI LAB; Service: Gastroenterology   • LAPAROSCOPIC CHOLECYSTECTOMY     • LAPAROSCOPY      for endometriosis   • WISDOM TOOTH EXTRACTION         Family History   Problem Relation Age of Onset   • No Known Problems Mother    • No Known Problems Father    • Alzheimer's disease Maternal Grandmother    • Esophageal cancer Maternal Grandfather    • Depression Paternal Grandmother    • Suicidality Paternal Grandmother    • No Known Problems Paternal Grandfather         reports that she has been smoking cigarettes. She has a 1.25 pack-year smoking history. She has never used smokeless tobacco. She reports current alcohol use of about 6.0 standard drinks of alcohol per week. She reports current drug use. Drug: Marijuana. Vitals:    11/02/23 1518   BP: 120/74   Pulse: 98   Temp: (!) 97.3 °F (36.3 °C)          PHYSICAL EXAM  General Appearance:    Alert, cooperative, no distress,    Head:    Normocephalic without obvious abnormality   Eyes:    PERRL, conjunctiva/corneas clear     Neck:   Supple, no adenopathy, no JVD   Back:     Symmetric, no spinal or CVA tenderness   Lungs:     Clear to auscultation bilaterally, no wheezing or rhonchi   Heart:    Regular rate and rhythm, S1 and S2 normal, no murmur   Abdomen:         Extremities:   Extremities normal. No clubbing, cyanosis or edema   Psych:   Normal Affect, AOx3. Neurologic:  Skin:   CNII-XII intact. Strength symmetric, speech intact    Warm, dry, intact, no visible rashes or lesions except as follows:  There is a 2-3 cm area on the right upper medial thigh that is closed, no opening, no flaring, no draining. Some portions of this record may have been generated with voice recognition software. There may be translation, syntax,  or grammatical errors. Occasional wrong word or "sound-a-like" substitutions may have occurred due to the inherent limitations of the voice recognition software. Read the chart carefully and recognize, using context, where substitutions may have occurred. If you have any questions, please contact the dictating provider for clarification or correction, as needed. This encounter has been coded by a non-certified coder.        Anibal Stringer MD    Date: 11/2/2023 Time: 4:02 PM

## 2023-10-24 NOTE — H&P (VIEW-ONLY)
Assessment/Plan:   Nilesh Hood is a 35 y. o.female who is here for   Chief Complaint   Patient presents with   • Pre-op Exam     EXCISION HIDRADENITIS GROIN. No changes since last visit. Presented for hidradenitis. Recently in acute care for right inner thigh bikini line hard reddened lump. This was drained. Packing was not placed. Cultures not obtained. Patient was discharged. Patient is on chronic doxycyline since may to keep this from flaring. She also gets steroid infections about every three weeks with dermatology. Previous history of hidradenitis only on this area, none on axilla or right groin. On exam found to have hydradenitis of the right upper medial thigh at bikini line. Plan: Excise lesion(s) under anesthesia in the operating room. Patient is aware this can recur and need another excision. This can also cause delayed wound healing. Positioning: supine - right frog leg      Patient was adamantly and strongly encouraged to stop smoking. This will never get better if she does not stop smoking. There is no guarantee it will get better if she does stop smoking but I guarantee it will never resolve and will continue to recur if she continues to smoke. This was made very clear and she expressed an understanding. Post Op Pain Management:   Motrin and Tylenol    - Patient has been instructed to avoid herbs or non-directed vitamins the week prior to surgery to ensure no drug interactions with perioperative surgical and anesthetic medications.   - Patient should continue beta-blocker medication up through and including the day of surgery but hold any other hypertensive medications, including diuretics, unless instructed by PCP or anesthesia  - Patient should continue his statin medication up through and including the day of surgery.  - Hold metformin , If on this medication, the morning of surgery and do not resume until 48 hours AFTER surgery to avoid risk of lactic acidosis. Do not resume if eGFR is < 30  - Insulin Management:If on Insulin, patient advised to call PCP for explicit instructions. In general, will need to take one-half normal dose am of surgery but pt advised to consult PCP before making any changes. - Patient has been instructed to avoid aspirin containing medications or non-steroidal anti-inflammatory drugs for SEVEN days preceding surgery. Preoperative Clearance: None    In preparation for this visit all relevant known prior office notes, prior consultations, emergency room visits, blood work results, and imaging studies were personally reviewed. A total of  30 minutes was spent reviewing all of this information, caring for this patient, providing differential diagnosis, instructions for management, counseling and coordination of care. This also includes planning surgical intervention where indicated. Patient was discussed and asked her medication list.    Discussion was held regarding anticoagulation, aspirin and Motrin use prior to surgery. Most anticoagulation needs to stop 7 to 10 days prior to surgery. Some can be stopped at 48 hours. Patient has had all questions answered by the physician or physician representative regarding anticoagulation. Patient is aware that they need to stop their anticoagulation preoperatively. Discussion was held regarding weight loss medication and diabetic medication with that also is used for weight loss. These medications have significant perioperative risk. He has medications need to be stopped 7 to 10 days prior to surgery. Patient understands and is aware they need to stop these type of medications preoperatively. Patient has been given a list of anticoagulation and/or weight loss/diabetes medications that would be affected by this and they have confirmed the are either not on these drugs or have understood their directions to stop them at least 1 week prior to surgery or as instructed.     Most herbal medication should be discontinued a week to 10 days prior to surgery. Diabetic medication such as insulin should be discussed with her primary care physician or the physician that ordered the insulin or similar medications. In general patients usually half their diabetic insulin the morning of the surgery but again this should be discussed with the physician. Patient understands and agrees to this aforementioned protocol.          _______________________________________________________  CC:Pre-op Exam (EXCISION HIDRADENITIS GROIN. No changes since last visit. )  . HPI:  Dorita Holcomb is a 35 y. o.female who was referred for evaluation of Pre-op Exam (EXCISION HIDRADENITIS GROIN. No changes since last visit. )  . Currently patient reports since may has had right groin/thigh hydradenitis comes and goes. She does get steroid injections from dermatology. She ahs been taking doxycycline orally since may to help decrease this. Recently in acute care for right inner thigh bikini line hard reddened lump. This was drained. Packing was not placed. Cultures not obtained. Patient was discharged. Patient is on chronic doxycyline since may to keep this from flaring. She also gets steroid infections about every three weeks with dermatology    Reports: draining, infected and today it is closed and not painful    Location: right upper medial thigh near bikini line      ROS:  General ROS: negative  negative for - chills, fatigue, fever or night sweats, weight loss  Respiratory ROS: no cough, shortness of breath, or wheezing  Cardiovascular ROS: no chest pain or dyspnea on exertion  Genito-Urinary ROS: no dysuria, trouble voiding, or hematuria  Musculoskeletal ROS: negative for - gait disturbance, joint pain or muscle pain  Neurological ROS: no TIA or stroke symptoms  Skin ROS: See HPI  GI ROS: see HPI  Skin ROS: no new rashes or lesions   Lymphatic ROS: no new adenopathy noted by pt.    GYN ROS: see HPI, no new GYN history or bleeding noted  Psy ROS: no new mental or behavioral disturbances       Patient Active Problem List   Diagnosis   • Epigastric pain   • Psoriatic arthritis ([address])   • Endometriosis   • Fibromyalgia muscle pain   • History of cholecystectomy   • Intractable abdominal pain   • Drug-seeking behavior   • Chronic eczematous otitis externa of left ear   • ETD (Eustachian tube dysfunction), left   • PONV (postoperative nausea and vomiting)         Allergies:  Patient has no known allergies.       Current Outpatient Medications:   •  clindamycin (CLEOCIN T) 1 % lotion, APPLY A THIN LAYER TO AFFECTED AREAS USE TWICE A DAY, Disp: , Rfl:   •  cyclobenzaprine (FLEXERIL) 5 mg tablet, Take 5 mg by mouth 3 (three) times a day as needed for muscle spasms, Disp: , Rfl:   •  doxycycline (PERIOSTAT) 20 MG tablet, daily, Disp: , Rfl:   •  drospirenone-ethinyl estradiol (PETE) 3-0.02 MG per tablet, Take 1 tablet by mouth daily, Disp: , Rfl:   •  ibuprofen (MOTRIN) 800 mg tablet, Take 800 mg by mouth every 6 (six) hours as needed for mild pain, Disp: , Rfl:   •  triamcinolone (KENALOG) 0.1 % ointment, daily as needed, Disp: , Rfl:   •  ketoconazole (NIZORAL) 2 % cream, APPLY TO THE FEET 2 TIMES A DAY FOR 6 WEEKS UNTIL CLEAR (Patient not taking: Reported on 11/2/2023), Disp: , Rfl:     Past Medical History:   Diagnosis Date   • Abdominal discomfort    • Abnormal Pap smear of cervix    • Anxiety    • Arthritis    • CFS (chronic fatigue syndrome)    • COVID-19 12/2021   • Endometriosis    • Endometritis    • Epigastric pain    • Fibromyalgia     denies   • GERD (gastroesophageal reflux disease)    • IBS (irritable bowel syndrome)    • Major depressive disorder    • Myalgia of pelvic floor    • Pelvic pain    • PONV (postoperative nausea and vomiting)    • Psoriatic arthritis (HCC)     denies   • Shortness of breath     exacerbated with rib pain   • Sphincter of Oddi dysfunction    • Urinary retention     denies       Past Surgical History:   Procedure Laterality Date   • ABDOMINAL SURGERY     • APPENDECTOMY LAPAROSCOPIC N/A 3/18/2022    Procedure: LAPAROSCOPIC APPENDECTOMY;  Surgeon: Carmen Rudd MD;  Location: [address] [place] [address] MAIN OR;  Service: General   • CHOLECYSTECTOMY     • DIAGNOSTIC LAPAROSCOPY     • EGD AND COLONOSCOPY     • ERCP     • ESOPHAGOGASTRODUODENOSCOPY N/A 11/28/2017    Procedure: ESOPHAGOGASTRODUODENOSCOPY (EGD); Surgeon: Milena Walters MD;  Location:  GI LAB; Service: Gastroenterology   • LAPAROSCOPIC CHOLECYSTECTOMY     • LAPAROSCOPY      for endometriosis   • WISDOM TOOTH EXTRACTION         Family History   Problem Relation Age of Onset   • No Known Problems Mother    • No Known Problems Father    • Alzheimer's disease Maternal Grandmother    • Esophageal cancer Maternal Grandfather    • Depression Paternal Grandmother    • Suicidality Paternal Grandmother    • No Known Problems Paternal Grandfather         reports that she has been smoking cigarettes. She has a 1.25 pack-year smoking history. She has never used smokeless tobacco. She reports current alcohol use of about 6.0 standard drinks of alcohol per week. She reports current drug use. Drug: Marijuana. Vitals:    11/02/23 1518   BP: 120/74   Pulse: 98   Temp: (!) 97.3 °F (36.3 °C)          PHYSICAL EXAM  General Appearance:    Alert, cooperative, no distress,    Head:    Normocephalic without obvious abnormality   Eyes:    PERRL, conjunctiva/corneas clear     Neck:   Supple, no adenopathy, no JVD   Back:     Symmetric, no spinal or CVA tenderness   Lungs:     Clear to auscultation bilaterally, no wheezing or rhonchi   Heart:    Regular rate and rhythm, S1 and S2 normal, no murmur   Abdomen:         Extremities:   Extremities normal. No clubbing, cyanosis or edema   Psych:   Normal Affect, AOx3. Neurologic:  Skin:   CNII-XII intact. Strength symmetric, speech intact    Warm, dry, intact, no visible rashes or lesions except as follows:  There is a 2-3 cm area on the right upper medial thigh that is closed, no opening, no flaring, no draining. Some portions of this record may have been generated with voice recognition software. There may be translation, syntax,  or grammatical errors. Occasional wrong word or "sound-a-like" substitutions may have occurred due to the inherent limitations of the voice recognition software. Read the chart carefully and recognize, using context, where substitutions may have occurred. If you have any questions, please contact the dictating provider for clarification or correction, as needed. This encounter has been coded by a non-certified coder.        Melody Kingston MD    Date: 11/2/2023 Time: 4:02 PM

## 2023-11-02 ENCOUNTER — OFFICE VISIT (OUTPATIENT)
Dept: SURGERY | Facility: CLINIC | Age: 33
End: 2023-11-02

## 2023-11-02 VITALS
SYSTOLIC BLOOD PRESSURE: 120 MMHG | DIASTOLIC BLOOD PRESSURE: 74 MMHG | BODY MASS INDEX: 27.97 KG/M2 | WEIGHT: 174 LBS | TEMPERATURE: 97.3 F | HEIGHT: 66 IN | HEART RATE: 98 BPM

## 2023-11-02 DIAGNOSIS — L73.2 HYDRADENITIS: Primary | ICD-10-CM

## 2023-11-02 DIAGNOSIS — F17.210 CIGARETTE SMOKER: ICD-10-CM

## 2023-11-02 PROCEDURE — NC001 PR NO CHARGE: Performed by: SURGERY

## 2023-11-02 NOTE — PRE-PROCEDURE INSTRUCTIONS
Pre-Surgery Instructions:   Medication Instructions    clindamycin (CLEOCIN T) 1 % lotion Hold day of surgery. cyclobenzaprine (FLEXERIL) 5 mg tablet Uses PRN- OK to take day of surgery    doxycycline (PERIOSTAT) 20 MG tablet Hold day of surgery. drospirenone-ethinyl estradiol (PETE) 3-0.02 MG per tablet Take day of surgery. ibuprofen (MOTRIN) 800 mg tablet Stop taking 7 days prior to surgery. triamcinolone (KENALOG) 0.1 % ointment Hold day of surgery. Medication instructions for day surgery reviewed. Please use only a sip of water to take your instructed medications. Avoid all over the counter vitamins, supplements and NSAIDS for one week prior to surgery per anesthesia guidelines. Tylenol is ok to take as needed. You will receive a call one business day prior to surgery with an arrival time and hospital directions. If your surgery is scheduled on a Monday, the hospital will be calling you on the Friday prior to your surgery. If you have not heard from anyone by 8pm, please call the hospital supervisor through the hospital  at [phone]. Ray Rodriguez [phone]). Do not eat or drink anything after midnight the night before your surgery, including candy, mints, lifesavers, or chewing gum. Do not drink alcohol 24hrs before your surgery. Try not to smoke at least 24hrs before your surgery. Follow the pre surgery showering instructions as listed in the [facility] Surgical Experience Booklet” or otherwise provided by your surgeon's office. Do not use a blade to shave the surgical area 1 week before surgery. It is okay to use a clean electric clippers up to 24 hours before surgery. Do not apply any lotions, creams, including makeup, cologne, deodorant, or perfumes after showering on the day of your surgery. Do not use dry shampoo, hair spray, hair gel, or any type of hair products. No contact lenses, eye make-up, or artificial eyelashes.  Remove nail polish, including gel polish, and any artificial, gel, or acrylic nails if possible. Remove all jewelry including rings and body piercing jewelry. Wear causal clothing that is easy to take on and off. Consider your type of surgery. Keep any valuables, jewelry, piercings at home. Please bring any specially ordered equipment (sling, braces) if indicated. Arrange for a responsible person to drive you to and from the hospital on the day of your surgery. Visitor Guidelines discussed. Call the surgeon's office with any new illnesses, exposures, or additional questions prior to surgery. Please reference your [facility] Surgical Experience Booklet” for additional information to prepare for your upcoming surgery.

## 2023-11-08 ENCOUNTER — ANESTHESIA EVENT (OUTPATIENT)
Dept: PERIOP | Facility: HOSPITAL | Age: 33
End: 2023-11-08
Payer: COMMERCIAL

## 2023-11-09 ENCOUNTER — HOSPITAL ENCOUNTER (OUTPATIENT)
Facility: HOSPITAL | Age: 33
Setting detail: OUTPATIENT SURGERY
Discharge: HOME | End: 2023-11-09
Attending: SURGERY | Admitting: SURGERY
Payer: COMMERCIAL

## 2023-11-09 ENCOUNTER — ANESTHESIA (OUTPATIENT)
Dept: PERIOP | Facility: HOSPITAL | Age: 33
End: 2023-11-09
Payer: COMMERCIAL

## 2023-11-09 VITALS
WEIGHT: 171.96 LBS | DIASTOLIC BLOOD PRESSURE: 76 MMHG | TEMPERATURE: 97.9 F | HEART RATE: 92 BPM | RESPIRATION RATE: 16 BRPM | HEIGHT: 66 IN | BODY MASS INDEX: 27.64 KG/M2 | SYSTOLIC BLOOD PRESSURE: 137 MMHG | OXYGEN SATURATION: 99 %

## 2023-11-09 DIAGNOSIS — L73.2 HIDRADENITIS: Primary | ICD-10-CM

## 2023-11-09 LAB
EXT PREGNANCY TEST URINE: NEGATIVE
EXT. CONTROL: NORMAL

## 2023-11-09 PROCEDURE — 81025 URINE PREGNANCY TEST: CPT | Performed by: SURGERY

## 2023-11-09 PROCEDURE — 88304 TISSUE EXAM BY PATHOLOGIST: CPT | Performed by: STUDENT IN AN ORGANIZED HEALTH CARE EDUCATION/TRAINING PROGRAM

## 2023-11-09 PROCEDURE — 11462 EXC SKN HDRDNT ING SMPL/NTRM: CPT | Performed by: SURGERY

## 2023-11-09 RX ORDER — GLYCOPYRROLATE 0.2 MG/ML
INJECTION INTRAMUSCULAR; INTRAVENOUS AS NEEDED
Status: DISCONTINUED | OUTPATIENT
Start: 2023-11-09 | End: 2023-11-09

## 2023-11-09 RX ORDER — FENTANYL CITRATE/PF 50 MCG/ML
50 SYRINGE (ML) INJECTION
Status: DISCONTINUED | OUTPATIENT
Start: 2023-11-09 | End: 2023-11-09 | Stop reason: HOSPADM

## 2023-11-09 RX ORDER — ONDANSETRON 2 MG/ML
4 INJECTION INTRAMUSCULAR; INTRAVENOUS ONCE AS NEEDED
Status: DISCONTINUED | OUTPATIENT
Start: 2023-11-09 | End: 2023-11-09 | Stop reason: HOSPADM

## 2023-11-09 RX ORDER — KETAMINE HCL IN NACL, ISO-OSM 100MG/10ML
SYRINGE (ML) INJECTION AS NEEDED
Status: DISCONTINUED | OUTPATIENT
Start: 2023-11-09 | End: 2023-11-09

## 2023-11-09 RX ORDER — ONDANSETRON 2 MG/ML
INJECTION INTRAMUSCULAR; INTRAVENOUS AS NEEDED
Status: DISCONTINUED | OUTPATIENT
Start: 2023-11-09 | End: 2023-11-09

## 2023-11-09 RX ORDER — HYDROCODONE BITARTRATE AND ACETAMINOPHEN 5; 325 MG/1; MG/1
1 TABLET ORAL EVERY 6 HOURS PRN
Qty: 5 TABLET | Refills: 0 | Status: SHIPPED | OUTPATIENT
Start: 2023-11-09 | End: 2023-11-20 | Stop reason: ALTCHOICE

## 2023-11-09 RX ORDER — MIDAZOLAM HYDROCHLORIDE 2 MG/2ML
INJECTION, SOLUTION INTRAMUSCULAR; INTRAVENOUS AS NEEDED
Status: DISCONTINUED | OUTPATIENT
Start: 2023-11-09 | End: 2023-11-09

## 2023-11-09 RX ORDER — SODIUM CHLORIDE 9 MG/ML
125 INJECTION, SOLUTION INTRAVENOUS CONTINUOUS
Status: DISCONTINUED | OUTPATIENT
Start: 2023-11-09 | End: 2023-11-09 | Stop reason: HOSPADM

## 2023-11-09 RX ORDER — FENTANYL CITRATE 50 UG/ML
INJECTION, SOLUTION INTRAMUSCULAR; INTRAVENOUS AS NEEDED
Status: DISCONTINUED | OUTPATIENT
Start: 2023-11-09 | End: 2023-11-09

## 2023-11-09 RX ORDER — DEXAMETHASONE SODIUM PHOSPHATE 10 MG/ML
INJECTION, SOLUTION INTRAMUSCULAR; INTRAVENOUS AS NEEDED
Status: DISCONTINUED | OUTPATIENT
Start: 2023-11-09 | End: 2023-11-09

## 2023-11-09 RX ORDER — PROPOFOL 10 MG/ML
INJECTION, EMULSION INTRAVENOUS CONTINUOUS PRN
Status: DISCONTINUED | OUTPATIENT
Start: 2023-11-09 | End: 2023-11-09

## 2023-11-09 RX ORDER — CEFAZOLIN SODIUM 1 G/50ML
1000 SOLUTION INTRAVENOUS ONCE
Status: COMPLETED | OUTPATIENT
Start: 2023-11-09 | End: 2023-11-09

## 2023-11-09 RX ORDER — LIDOCAINE HYDROCHLORIDE 10 MG/ML
INJECTION, SOLUTION EPIDURAL; INFILTRATION; INTRACAUDAL; PERINEURAL AS NEEDED
Status: DISCONTINUED | OUTPATIENT
Start: 2023-11-09 | End: 2023-11-09

## 2023-11-09 RX ORDER — PROPOFOL 10 MG/ML
INJECTION, EMULSION INTRAVENOUS AS NEEDED
Status: DISCONTINUED | OUTPATIENT
Start: 2023-11-09 | End: 2023-11-09

## 2023-11-09 RX ADMIN — PROPOFOL 300 MG: 10 INJECTION, EMULSION INTRAVENOUS at 08:52

## 2023-11-09 RX ADMIN — Medication 20 MG: at 09:07

## 2023-11-09 RX ADMIN — FENTANYL CITRATE 50 MCG: 50 INJECTION INTRAMUSCULAR; INTRAVENOUS at 09:14

## 2023-11-09 RX ADMIN — PROPOFOL 50 MG: 10 INJECTION, EMULSION INTRAVENOUS at 09:02

## 2023-11-09 RX ADMIN — FENTANYL CITRATE 50 MCG: 50 INJECTION INTRAMUSCULAR; INTRAVENOUS at 10:09

## 2023-11-09 RX ADMIN — FENTANYL CITRATE 50 MCG: 50 INJECTION INTRAMUSCULAR; INTRAVENOUS at 09:03

## 2023-11-09 RX ADMIN — FENTANYL CITRATE 50 MCG: 50 INJECTION INTRAMUSCULAR; INTRAVENOUS at 08:57

## 2023-11-09 RX ADMIN — FENTANYL CITRATE 50 MCG: 50 INJECTION INTRAMUSCULAR; INTRAVENOUS at 10:20

## 2023-11-09 RX ADMIN — MIDAZOLAM 4 MG: 1 INJECTION INTRAMUSCULAR; INTRAVENOUS at 08:43

## 2023-11-09 RX ADMIN — ONDANSETRON 4 MG: 2 INJECTION INTRAMUSCULAR; INTRAVENOUS at 08:55

## 2023-11-09 RX ADMIN — GLYCOPYRROLATE 0.1 MG: 0.2 INJECTION INTRAMUSCULAR; INTRAVENOUS at 08:49

## 2023-11-09 RX ADMIN — PROPOFOL 100 MG: 10 INJECTION, EMULSION INTRAVENOUS at 08:59

## 2023-11-09 RX ADMIN — PROPOFOL 150 MCG/KG/MIN: 10 INJECTION, EMULSION INTRAVENOUS at 08:52

## 2023-11-09 RX ADMIN — CEFAZOLIN SODIUM 1000 MG: 1 SOLUTION INTRAVENOUS at 09:03

## 2023-11-09 RX ADMIN — DEXAMETHASONE SODIUM PHOSPHATE 10 MG: 10 INJECTION INTRAMUSCULAR; INTRAVENOUS at 08:57

## 2023-11-09 RX ADMIN — SODIUM CHLORIDE 125 ML/HR: 0.9 INJECTION, SOLUTION INTRAVENOUS at 08:00

## 2023-11-09 RX ADMIN — LIDOCAINE HYDROCHLORIDE 100 MG: 10 INJECTION, SOLUTION EPIDURAL; INFILTRATION; INTRACAUDAL; PERINEURAL at 08:52

## 2023-11-09 NOTE — ANESTHESIA PREPROCEDURE EVALUATION
Procedure:  EXCISION HIDRADENITIS GROIN - upper medial thigh/groin (Right: Groin)    Relevant Problems   ANESTHESIA   (+) PONV (postoperative nausea and vomiting)                              (+) MJ and tobacco use  Physical Exam    Airway    Mallampati score: II  TM Distance: >3 FB  Neck ROM: full     Dental       Cardiovascular  Rhythm: regular    Pulmonary   Breath sounds clear to auscultation    Other Findings        Anesthesia Plan  ASA Score- 2     Anesthesia Type- general with ASA Monitors. Additional Monitors:     Airway Plan:            Plan Factors-Exercise tolerance (METS): >4 METS. Chart reviewed. Existing labs reviewed. Patient is a current smoker. Induction- intravenous. Postoperative Plan- Plan for postoperative opioid use. Planned trial extubation    Informed Consent- Anesthetic plan and risks discussed with patient.

## 2023-11-09 NOTE — INTERVAL H&P NOTE
H&P reviewed. After examining the patient I find no changes in the patients condition since the H&P had been written.     Vitals:    11/09/23 0724   BP: 126/81   Pulse: 89   Resp: 16   Temp: 97.5 °F (36.4 °C)   SpO2: 100%

## 2023-11-09 NOTE — OP NOTE
EXCISION HIDRADENITIS GROIN - upper medial thigh/groin  Postoperative Note  PATIENT NAME: Tonia Griffiths  : 1990  MRN: [id number]  [facility] ROOM 06      Consent:  The risks, benefits, and alternatives to the surgery were discussed with the patient and with the family prior to surgery, personally by Dr. Blayne Carbone. If the consent was obtained by the physician assistant or other representative, the consent was reviewed once again personally by the operating physician. Common complications particular for this procedure as well as unusual complications were discussed, including but not limited to:  bleeding, wound infection, prolonged wound healing, open wounds, reoperation and/or recurrence of the lesion. A  was used if necessary. The patient expressed understanding of the issues discussed and wished and consented to the procedure to proceed. All questions were answered. Dr. Blayne Carbone personally discussed the informed consent with this patient. Surgery Date: 2023    Pre operative diagnosis:   Hidradenitis [L73.2]    Operative Indications:  Soft tissue mass    Operative Findings:    Final Size of the lesion is estimated at: 2 cm , inclusive of margins. Post operative diagnosis :and findings  Post-Op Diagnosis Codes:     * Hidradenitis [L73.2]    Procedure:   Procedure(s):  EXCISION HIDRADENITIS GROIN - upper medial thigh/groin    Surgeon(s) and Role:     * Michelle Small MD - Primary     * Jeniffer Porras MD - Assisting  The assistant was medically necessary for surgical safety the case including suturing, retraction, and hemostasis. A qualified resident was available. I provided direct and immediate supervision. I was present for the entire procedure.      Drains:  * No LDAs found *    Specimens:  ID Type Source Tests Collected by Time Destination   1 : Hidradenitis right groin Tissue Soft Tissue, Other TISSUE EXAM Michelle Small MD 2023 0911        Estimated Blood Loss:   Minimal    Anesthesia Type:   Choice     Procedure: The patient was seen in the Holding Room. The risks, benefits, complications, treatment options, and expected outcomes were discussed with the patient. The possibilities of reaction to medication, bleeding, infection, the need for additional procedures, failure to diagnose a condition, and creating a complication operation were discussed with the patient. The patient concurred with the proposed plan, giving informed consent. The site of surgery properly noted/marked. The patient was taken to Operating Room, identified as [facility] and staff verified the patient name, , site, and laterality, if applicable. A Time Out was held and the above information confirmed. The patient was placed supine. The right leg was prepped and draped in standard fashion. Local anesthesia was used to anesthetize the skin surrounding the lesion. A oblique elliptical incision was made over the lesion. Sharp and blunt dissection were used to mobilize the mass which was in a subcutaneous location. Hemostasis was achieved with cautery. Scissors, knife, and cautery were used in the excision so that 2mm  margins were taken around the lesion. Tissue removed: without necrosis, devitalization, and non viable tissue     Type Tissue removed: skin, subcutaneous tissue, fat, and cyst /mass    Closure was achieved a with layered closure utilizing a 3-0 Vicryl subcutaneous layer and a  4-0 Monocryl subcuticular stitch. Steristrips  and Histoacryl  applied and the wound dressed. Sponge count and needle count and instrument count were correct x2, and RFA wanding for sponges was also negative at the end of the procedure prior to closure. .    Skin and soft tissue/subcutaneous tissue and fat were removed along with the mass. Some portions of this records may have been generated with voice recognition software.  There may be translation, syntax, or grammatical errors. Occasional wrong word or "sound-a-like" substitutions may have occurred due to the inherent limitations of the voice recognition software. Read the chart carefully and recognize, using context, where substations may have occurred. If you have any questions, please contact the dictating provider for clarification or correction, as needed.        Complications: None    Condition: Stable to PACU    SIGNATURE: Donald Henderson MD   DATE: November 9, 2023   TIME: 9:29 AM

## 2023-11-09 NOTE — DISCHARGE INSTR - AVS FIRST PAGE
,[facility] Instructions  Dr. Candelario Garza MD, FACS    1. General: You will feel pulling sensations around the wound or funny aches and pains around the incisions. This is normal. Even minor surgery is a change in your body and this is your body’s way of reaction to it. If you have had abdominal surgery, it may help to support the incision with a small pillow or blanket for comfort when moving or coughing. If you have had laparoscopic surgery or robotic surgery, you may have right shoulder pain after surgery. This is common and not unexpected. This is due to a muscle spasm of the trapezius muscle. As with any muscle spasm, heating pad is very useful. Please apply a heating pad liberally and carefully to the right shoulder muscle, and this will relieve much of your discomfort. 2. Wound care: Make sure to remove the bandage in about 24 hours, unless instructed otherwise. You usually don't have to redress the wound after 24-48 hours, unless for comfort. Keep the incision clean and dry. Let air get to it. If this Steri-Strips fall off, just keep the wound clean. If you have a black bandage, generally this is kept on for 2 to 3 days. It is waterproof and you may shower over it. 3. Water: You may shower over the wound, unless there are drain tubes left in place. Do not bathe or use a pool or hot tub until cleared by the physician. You may shower right over the staples or Steri-Strips and packing dry when you are done. 4. Activity: You may go up and down stairs, walk as much as you are comfortable, but walk at least 3 times each day. If you have had abdominal surgery, do not lift anything heavier than 15 pounds for at least 2-4 weeks, unless cleared by the doctor. 5. Diet: You may resume a regular diet. If you had a same-day surgery or overnight stay surgery, you may wish to eat lightly for a few days: soups, crackers, and sandwiches.  You may resume a regular diet when ready. 6. Medications: Resume all of your previous medications, unless told otherwise by the doctor. You may take aspirin or ibuprofen (Advil, Motrin, etc.) for pain after the surgery, unless directed otherwise. Tylenol is always fine, unless you are taking any narcotic pain medication containing Tylenol (such as Percocet, Darvocet, Vicodin, or anything containing acetaminophen). Do not take Tylenol if you're taking these medications. You do not need to take the narcotic pain medications unless you are having significant pain. 7. Driving: You will need someone to drive you home on the day of surgery. Do not drive or make any important decisions while on narcotic pain medication or 24 hours and after anesthesia or sedation for surgery. Generally, you may drive when your off all narcotics. 8. Upset Stomach: You may take Maalox, Tums, or similar items for an upset stomach. If your narcotic pain medication causes an upset stomach, do not take it on an empty stomach. Try taking it with at least some crackers or toast.     9. Constipation: Patients often experienced constipation after surgery. You may take over-the-counter medication for this, such as Metamucil, Senokot, Dulcolax, milk of magnesia, etc. You may take a suppository unless you have had anorectal surgery such as a procedure on your hemorrhoids. If you experience significant nausea or vomiting after abdominal surgery, call the office before trying any of these medications. 10. Call the office: If you are experiencing any of the following, fevers above 101.5°, significant nausea or vomiting, if the wound develops drainage and/or is excessive redness around the wound, or if you have significant diarrhea or other worsening symptoms. 11. Pain: You may be given a prescription for pain. This will be given to the hospital, the day of surgery.     12. Sexual Activity: You may resume sexual activity when you feel ready and comfortable and your incision is sealed and healed without apparent infection risk. 13. Urination: If you haven't urinated in 6 hours, go directly to the ER for evaluation for urinary retention.      [address], [address]  [facility], [address] [place] [address]  Phone: [phone]

## 2023-11-09 NOTE — ANESTHESIA POSTPROCEDURE EVALUATION
Post-Op Assessment Note    CV Status:  Stable    Pain management: adequate     Mental Status:  Alert and awake   Hydration Status:  Euvolemic   PONV Controlled:  Controlled   Airway Patency:  Patent      Post Op Vitals Reviewed: Yes      Staff: Anesthesiologist         No notable events documented.     /63 (11/09/23 1048)    Temp 97.8 °F (36.6 °C) (11/09/23 1048)    Pulse 76 (11/09/23 1048)   Resp 15 (11/09/23 1048)    SpO2 100 % (11/09/23 1048)

## 2023-11-10 ENCOUNTER — TELEPHONE (OUTPATIENT)
Dept: SURGERY | Facility: CLINIC | Age: 33
End: 2023-11-10

## 2023-11-10 NOTE — TELEPHONE ENCOUNTER
Post op follow up     No f,c,v,n. Had bowel movement. Wound care reviewed. Will call with path results once in and reviewed. Advised to call the office with any questions or concerns. Patient verbally understands.

## 2023-11-14 PROCEDURE — 88304 TISSUE EXAM BY PATHOLOGIST: CPT | Performed by: STUDENT IN AN ORGANIZED HEALTH CARE EDUCATION/TRAINING PROGRAM

## 2023-11-20 ENCOUNTER — OFFICE VISIT (OUTPATIENT)
Dept: SURGERY | Facility: CLINIC | Age: 33
End: 2023-11-20

## 2023-11-20 VITALS
HEART RATE: 124 BPM | DIASTOLIC BLOOD PRESSURE: 66 MMHG | WEIGHT: 170 LBS | TEMPERATURE: 95.6 F | OXYGEN SATURATION: 99 % | SYSTOLIC BLOOD PRESSURE: 124 MMHG | BODY MASS INDEX: 27.32 KG/M2 | HEIGHT: 66 IN

## 2023-11-20 DIAGNOSIS — Z09 POSTOP CHECK: Primary | ICD-10-CM

## 2023-11-20 PROCEDURE — 99024 POSTOP FOLLOW-UP VISIT: CPT | Performed by: PHYSICIAN ASSISTANT

## 2023-11-20 NOTE — PROGRESS NOTES
Assessment/Plan:   Marquez Bolaños is a 35 y. o.female who comes in today for postoperative check after excision of hydradenitis right leg with Dr. Gerson Blanco on 11/9/23. Pathology: Reviewed with patient, all questions answered. Final Diagnosis   A. Skin and subcutaneous tissue, "hidradenitis right groin"; excision:       - Changes consistent with hidradenitis suppurativa (acne inversa)      - Negative for malignancy      Patient does look pale and pulse is elevated. Her incision looks fantastic and healing well, no signs of infection. We did speak about covid/flu symptoms. will go to urgent care if she feels feverish or needs medical attention. Postoperative restrictions reviewed. All questions answered. ______________________________________________________  HPI:  Marquez Bolaños is a 35 y. o.female who comes in today for postoperative check after recent excision of hydradenitis right leg with Dr. Gerson Blanco on 11/9/23. Currently doing well but is having pain at bottom of incision and wants to be checked, no fever or chills,no nausea and no vomiting. Reports some chills but no recorded fevers but does have pain in lower incision. ROS:  General ROS: negative for - chills, fatigue, fever or night sweats, weight loss  Respiratory ROS: no cough, shortness of breath, or wheezing  Cardiovascular ROS: no chest pain or dyspnea on exertion  Genito-Urinary ROS: no dysuria, trouble voiding, or hematuria  Musculoskeletal ROS: negative for - gait disturbance, joint pain or muscle pain  Neurological ROS: no TIA or stroke symptoms  GI ROS: see HPI  Skin ROS: no new rashes or lesions   Lymphatic ROS: no new adenopathy noted by pt.    GYN ROS: see HPI, no new GYN history or bleeding noted  Psy ROS: no new mental or behavioral disturbances         Patient Active Problem List   Diagnosis    Epigastric pain    Psoriatic arthritis ([address])    Endometriosis    Fibromyalgia muscle pain    History of cholecystectomy Intractable abdominal pain    Drug-seeking behavior    Chronic eczematous otitis externa of left ear    ETD (Eustachian tube dysfunction), left    PONV (postoperative nausea and vomiting)       Allergies:  Patient has no known allergies. Current Outpatient Medications:     cyclobenzaprine (FLEXERIL) 5 mg tablet, Take 5 mg by mouth 3 (three) times a day as needed for muscle spasms, Disp: , Rfl:     drospirenone-ethinyl estradiol (PETE) 3-0.02 MG per tablet, Take 1 tablet by mouth daily, Disp: , Rfl:     ibuprofen (MOTRIN) 800 mg tablet, Take 800 mg by mouth every 6 (six) hours as needed for mild pain, Disp: , Rfl:     ketoconazole (NIZORAL) 2 % cream, , Disp: , Rfl:     triamcinolone (KENALOG) 0.1 % ointment, daily as needed, Disp: , Rfl:     Past Medical History:   Diagnosis Date    Abdominal discomfort     Abnormal Pap smear of cervix     Anxiety     CFS (chronic fatigue syndrome)     COVID-19 12/2021    Endometriosis     Endometritis     Epigastric pain     Fibromyalgia     denies    GERD (gastroesophageal reflux disease)     IBS (irritable bowel syndrome)     Major depressive disorder     Myalgia of pelvic floor     Pelvic pain     PONV (postoperative nausea and vomiting)     Psoriatic arthritis (HCC)     denies    Shortness of breath     exacerbated with rib pain    Sphincter of Oddi dysfunction     Urinary retention     denies       Past Surgical History:   Procedure Laterality Date    ABDOMINAL SURGERY      APPENDECTOMY LAPAROSCOPIC N/A 3/18/2022    Procedure: LAPAROSCOPIC APPENDECTOMY;  Surgeon: Doris North MD;  Location: [address] [place] [address];  Service: General    CHOLECYSTECTOMY      DIAGNOSTIC LAPAROSCOPY      EGD AND COLONOSCOPY      ERCP      ESOPHAGOGASTRODUODENOSCOPY N/A 11/28/2017    Procedure: ESOPHAGOGASTRODUODENOSCOPY (EGD); Surgeon: Gerber Seymour MD;  Location: [facility];   Service: Gastroenterology    LAPAROSCOPIC CHOLECYSTECTOMY      LAPAROSCOPY      for endometriosis    AR EXCISION HIDRADENITIS INGUINAL SMPL/INTRM RPR Right 11/9/2023    Procedure: EXCISION HIDRADENITIS GROIN - upper medial thigh/groin;  Surgeon: Christine Carty MD;  Location: [facility];  Service: General    WISDOM TOOTH EXTRACTION         Family History   Problem Relation Age of Onset    No Known Problems Mother     No Known Problems Father     Alzheimer's disease Maternal Grandmother     Esophageal cancer Maternal Grandfather     Depression Paternal Grandmother     Suicidality Paternal Grandmother     No Known Problems Paternal Grandfather         reports that she has been smoking cigarettes. She has a 0.75 pack-year smoking history. She has never been exposed to tobacco smoke. She has never used smokeless tobacco. She reports current alcohol use of about 6.0 standard drinks of alcohol per week. She reports current drug use. Drug: Marijuana. Vitals:    11/20/23 0917   BP: 124/66   Pulse: (!) 124   Temp: (!) 95.6 °F (35.3 °C)   SpO2: 99%       PHYSICAL EXAM  General: normal, cooperative, no distress  Abdominal: soft, nondistended, or nontender  Incision: clean, dry, and intact and healing well - no erythema, no drainage. Incision looks great but I did remove knots. Pain relieved with removal of knots. Patient does look pale and pulse is 124. Some portions of this record may have been generated with voice recognition software. There may be translation, syntax,  or grammatical errors. Occasional wrong word or "sound-a-like" substitutions may have occurred due to the inherent limitations of the voice recognition software. Read the chart carefully and recognize, using context, where substitutions may have occurred. If you have any questions, please contact the dictating provider for clarification or correction, as needed. This encounter has been coded by a non-certified coder.        Dima Mendoza PA-C    Date: 11/20/2023 Time: 9:30 AM

## 2024-02-20 ENCOUNTER — OFFICE VISIT (OUTPATIENT)
Dept: URGENT CARE | Facility: CLINIC | Age: 34
End: 2024-02-20
Payer: COMMERCIAL

## 2024-02-20 VITALS
HEART RATE: 86 BPM | BODY MASS INDEX: 28.12 KG/M2 | SYSTOLIC BLOOD PRESSURE: 152 MMHG | RESPIRATION RATE: 18 BRPM | WEIGHT: 175 LBS | HEIGHT: 66 IN | OXYGEN SATURATION: 96 % | TEMPERATURE: 98.9 F | DIASTOLIC BLOOD PRESSURE: 84 MMHG

## 2024-02-20 DIAGNOSIS — R05.1 ACUTE COUGH: ICD-10-CM

## 2024-02-20 DIAGNOSIS — J06.9 VIRAL UPPER RESPIRATORY TRACT INFECTION: Primary | ICD-10-CM

## 2024-02-20 LAB
SARS-COV-2 AG UPPER RESP QL IA: NEGATIVE
VALID CONTROL: NORMAL

## 2024-02-20 PROCEDURE — 87811 SARS-COV-2 COVID19 W/OPTIC: CPT

## 2024-02-20 PROCEDURE — 99213 OFFICE O/P EST LOW 20 MIN: CPT

## 2024-02-20 NOTE — PROGRESS NOTES
[facility]        NAME: Laura Curtis is a 33 y.o. female  : 1990    MRN: [id number]  DATE: 2024  TIME: 1:23 PM    Assessment and Plan   Viral upper respiratory tract infection [J06.9]  1. Viral upper respiratory tract infection        2. Acute cough  Poct Covid 19 Rapid Antigen Test            Patient Instructions   Increase your fluids and rest  Take Tylenol or Motrin for body aches fever  Flonase nasal spray 1 spray in each nostril daily  Saline nasal spray in each nostril as often as needed  Mucinex for congestion as directed  Your COVID test was negative in the office today  Follow up with PCP in 3-5 days.  Proceed to  ER if symptoms worsen.    Chief Complaint     Chief Complaint   Patient presents with    Cough     Pt presents with cough and loss of voice x 1 week.  No otcs.           History of Present Illness       This is a 33-year-old female who presents today with a 1 week history of cough, sinus pressure nasal drip throat ear pressure.  She states that yesterday she had diarrhea all day.  She has not taken any over-the-counter medications.  She states that her mom was also sick and diagnosed with pneumonia    Cough  Associated symptoms include postnasal drip and a sore throat. Pertinent negatives include no chest pain, chills, fever, headaches, myalgias or shortness of breath.       Review of Systems   Review of Systems   Constitutional:  Negative for chills, fatigue and fever.   HENT:  Positive for congestion, postnasal drip and sore throat.    Eyes: Negative.    Respiratory:  Positive for cough. Negative for shortness of breath.    Cardiovascular:  Negative for chest pain.   Gastrointestinal:  Positive for diarrhea. Negative for nausea and vomiting.   Genitourinary: Negative.    Musculoskeletal: Negative.  Negative for myalgias.   Neurological:  Negative for headaches.         Current Medications       Current Outpatient Medications:     cyclobenzaprine (FLEXERIL) 5 mg  tablet, Take 5 mg by mouth 3 (three) times a day as needed for muscle spasms, Disp: , Rfl:     drospirenone-ethinyl estradiol (PETE) 3-0.02 MG per tablet, Take 1 tablet by mouth daily, Disp: , Rfl:     ibuprofen (MOTRIN) 800 mg tablet, Take 800 mg by mouth every 6 (six) hours as needed for mild pain (Patient not taking: Reported on 2/20/2024), Disp: , Rfl:     ketoconazole (NIZORAL) 2 % cream, , Disp: , Rfl:     triamcinolone (KENALOG) 0.1 % ointment, daily as needed (Patient not taking: Reported on 2/20/2024), Disp: , Rfl:     Current Allergies     Allergies as of 02/20/2024    (No Known Allergies)            The following portions of the patient's history were reviewed and updated as appropriate: allergies, current medications, past family history, past medical history, past social history, past surgical history and problem list.     Past Medical History:   Diagnosis Date    Abdominal discomfort     Abnormal Pap smear of cervix     Anxiety     CFS (chronic fatigue syndrome)     COVID-19 12/2021    Endometriosis     Endometritis     Epigastric pain     Fibromyalgia     denies    GERD (gastroesophageal reflux disease)     IBS (irritable bowel syndrome)     Major depressive disorder     Myalgia of pelvic floor     Pelvic pain     PONV (postoperative nausea and vomiting)     Psoriatic arthritis (HCC)     denies    Shortness of breath     exacerbated with rib pain    Sphincter of Oddi dysfunction     Urinary retention     denies       Past Surgical History:   Procedure Laterality Date    ABDOMINAL SURGERY      APPENDECTOMY LAPAROSCOPIC N/A 3/18/2022    Procedure: LAPAROSCOPIC APPENDECTOMY;  Surgeon: Fausto Burgos MD;  Location:  [facility];  Service: General    CHOLECYSTECTOMY      DIAGNOSTIC LAPAROSCOPY      EGD AND COLONOSCOPY      ERCP      ESOPHAGOGASTRODUODENOSCOPY N/A 11/28/2017    Procedure: ESOPHAGOGASTRODUODENOSCOPY (EGD);  Surgeon: Christ Esquivel MD;  Location: [facility];  Service: Gastroenterology     "LAPAROSCOPIC CHOLECYSTECTOMY      LAPAROSCOPY      for endometriosis    RI EXCISION HIDRADENITIS INGUINAL SMPL/INTRM RPR Right 11/9/2023    Procedure: EXCISION HIDRADENITIS GROIN - upper medial thigh/groin;  Surgeon: Gail Nelson MD;  Location: [facility];  Service: General    WISDOM TOOTH EXTRACTION         Family History   Problem Relation Age of Onset    No Known Problems Mother     No Known Problems Father     Alzheimer's disease Maternal Grandmother     Esophageal cancer Maternal Grandfather     Depression Paternal Grandmother     Suicidality Paternal Grandmother     No Known Problems Paternal Grandfather          Medications have been verified.        Objective   /84   Pulse 86   Temp 98.9 °F (37.2 °C)   Resp 18   Ht 5' 6\" (1.676 m)   Wt 79.4 kg (175 lb)   SpO2 96%   BMI 28.25 kg/m²   No LMP recorded.       Physical Exam     Physical Exam  Constitutional:       Appearance: Normal appearance. She is normal weight.   HENT:      Head: Normocephalic and atraumatic.      Right Ear: Tympanic membrane, ear canal and external ear normal.      Left Ear: Tympanic membrane, ear canal and external ear normal.      Nose: Congestion present.      Mouth/Throat:      Mouth: Mucous membranes are moist.      Pharynx: Oropharynx is clear. Posterior oropharyngeal erythema present.   Eyes:      Conjunctiva/sclera: Conjunctivae normal.      Pupils: Pupils are equal, round, and reactive to light.   Cardiovascular:      Rate and Rhythm: Normal rate and regular rhythm.      Pulses: Normal pulses.      Heart sounds: Normal heart sounds.   Pulmonary:      Effort: Pulmonary effort is normal.      Breath sounds: Normal breath sounds. No wheezing, rhonchi or rales.   Abdominal:      General: Abdomen is flat. Bowel sounds are normal.      Tenderness: There is no abdominal tenderness.   Musculoskeletal:         General: Normal range of motion.      Cervical back: Normal range of motion and neck supple.   Lymphadenopathy:     "  Cervical: No cervical adenopathy.   Skin:     General: Skin is warm and dry.      Capillary Refill: Capillary refill takes less than 2 seconds.   Neurological:      General: No focal deficit present.      Mental Status: She is alert and oriented to person, place, and time. Mental status is at baseline.   Psychiatric:         Mood and Affect: Mood normal.         Thought Content: Thought content normal.         Judgment: Judgment normal.

## 2024-02-20 NOTE — PATIENT INSTRUCTIONS
Increase your fluids and rest  Take Tylenol or Motrin for body aches fever  Flonase nasal spray 1 spray in each nostril daily  Saline nasal spray in each nostril as often as needed  Mucinex for congestion as directed  Your COVID test was negative in the office today    
Other

## 2024-02-21 PROBLEM — J06.9 VIRAL UPPER RESPIRATORY TRACT INFECTION: Status: RESOLVED | Noted: 2024-02-20 | Resolved: 2024-02-21

## 2024-02-21 PROBLEM — R05.1 ACUTE COUGH: Status: RESOLVED | Noted: 2024-02-20 | Resolved: 2024-02-21

## 2024-05-03 NOTE — DISCHARGE INSTRUCTIONS
Please consider eating smaller frequent meals throughout the day Render Risk Assessment In Note?: no Detail Level: Simple Additional Notes: Performing Provider: DR. MCCLENDON\\nRepairing Provider (if applicable):\\nProcedure: MOHS\\nSurgical Location: left superior helix\\nDiagnosis (per pathology report): WELL DIFFERENTIATED SQUAMOUS CELL CARCINOMA \\nSize of lesion (size provided on pathology report):\\n\\nSee a physician for any heart conditions (If yes, who and for what): Y-NO CONDITIONS\\nArtificial Heart Valves: N\\nMitral valve prolapse: N\\nHeart Murmur: YES HX OF HEART MURMUR\\nPacemaker: N\\nDefibrillator: N\\nArtificial joints (if yes, indicate location and year): N\\nDoes the patient pre-op medicate with antibiotics (if yes, what medication): N\\n**Pharmacy: N\\nHistory of renal disease or on dialysis: N\\nHistory of liver disease: N\\nHistory of bleeding disorder: N\\nLow platelet count (if the patient can provider):N\\nWill patient discontinue blood thinners, including NSAIDS (Advil, Motrin, ibuprofen & fish oil) (discontinue only by provider order):Y\\nImmunosuppressed: N\\nPatient verbalizes understanding of pre-operative instructions: Y\\n\\nNotes (if applicable): PT TAKES OTC 81 MG ASPIRIN DAILY, WILL D/C -SHORT NOTICE OF APPT BUT WILL HOLD

## 2024-09-02 ENCOUNTER — APPOINTMENT (EMERGENCY)
Dept: CT IMAGING | Facility: HOSPITAL | Age: 34
End: 2024-09-02
Payer: COMMERCIAL

## 2024-09-02 ENCOUNTER — HOSPITAL ENCOUNTER (EMERGENCY)
Facility: HOSPITAL | Age: 34
Discharge: HOME | End: 2024-09-02
Attending: EMERGENCY MEDICINE
Payer: COMMERCIAL

## 2024-09-02 ENCOUNTER — APPOINTMENT (EMERGENCY)
Dept: RADIOLOGY | Facility: HOSPITAL | Age: 34
End: 2024-09-02
Payer: COMMERCIAL

## 2024-09-02 VITALS
HEART RATE: 135 BPM | DIASTOLIC BLOOD PRESSURE: 81 MMHG | TEMPERATURE: 98.7 F | HEIGHT: 66 IN | RESPIRATION RATE: 18 BRPM | WEIGHT: 170 LBS | OXYGEN SATURATION: 99 % | SYSTOLIC BLOOD PRESSURE: 129 MMHG | BODY MASS INDEX: 27.32 KG/M2

## 2024-09-02 DIAGNOSIS — S52.121A RIGHT RADIAL HEAD FRACTURE: Primary | ICD-10-CM

## 2024-09-02 LAB
EXT PREGNANCY TEST URINE: NEGATIVE
EXT. CONTROL: NORMAL

## 2024-09-02 PROCEDURE — 81025 URINE PREGNANCY TEST: CPT

## 2024-09-02 PROCEDURE — 73070 X-RAY EXAM OF ELBOW: CPT

## 2024-09-02 PROCEDURE — 90471 IMMUNIZATION ADMIN: CPT

## 2024-09-02 PROCEDURE — 99284 EMERGENCY DEPT VISIT MOD MDM: CPT

## 2024-09-02 PROCEDURE — 73030 X-RAY EXAM OF SHOULDER: CPT

## 2024-09-02 PROCEDURE — 29126 APPL SHORT ARM SPLINT DYN: CPT

## 2024-09-02 PROCEDURE — 96372 THER/PROPH/DIAG INJ SC/IM: CPT

## 2024-09-02 PROCEDURE — 73090 X-RAY EXAM OF FOREARM: CPT

## 2024-09-02 PROCEDURE — 70450 CT HEAD/BRAIN W/O DYE: CPT

## 2024-09-02 PROCEDURE — 73130 X-RAY EXAM OF HAND: CPT

## 2024-09-02 PROCEDURE — 90715 TDAP VACCINE 7 YRS/> IM: CPT

## 2024-09-02 RX ORDER — OXYCODONE HYDROCHLORIDE 5 MG/1
5 TABLET ORAL EVERY 6 HOURS PRN
Qty: 7 TABLET | Refills: 0 | Status: SHIPPED | OUTPATIENT
Start: 2024-09-02 | End: 2024-09-12

## 2024-09-02 RX ORDER — GINSENG 100 MG
1 CAPSULE ORAL ONCE
Status: COMPLETED | OUTPATIENT
Start: 2024-09-02 | End: 2024-09-02

## 2024-09-02 RX ORDER — KETOROLAC TROMETHAMINE 30 MG/ML
15 INJECTION, SOLUTION INTRAMUSCULAR; INTRAVENOUS ONCE
Status: COMPLETED | OUTPATIENT
Start: 2024-09-02 | End: 2024-09-02

## 2024-09-02 RX ORDER — IBUPROFEN 600 MG/1
600 TABLET, FILM COATED ORAL EVERY 6 HOURS PRN
Qty: 30 TABLET | Refills: 0 | Status: SHIPPED | OUTPATIENT
Start: 2024-09-02

## 2024-09-02 RX ORDER — HYDROCODONE BITARTRATE AND ACETAMINOPHEN 5; 325 MG/1; MG/1
1 TABLET ORAL ONCE
Status: COMPLETED | OUTPATIENT
Start: 2024-09-02 | End: 2024-09-02

## 2024-09-02 RX ADMIN — BACITRACIN ZINC 1 LARGE APPLICATION: 500 OINTMENT TOPICAL at 19:14

## 2024-09-02 RX ADMIN — TETANUS TOXOID, REDUCED DIPHTHERIA TOXOID AND ACELLULAR PERTUSSIS VACCINE, ADSORBED 0.5 ML: 5; 2.5; 8; 8; 2.5 SUSPENSION INTRAMUSCULAR at 19:14

## 2024-09-02 RX ADMIN — KETOROLAC TROMETHAMINE 15 MG: 30 INJECTION, SOLUTION INTRAMUSCULAR; INTRAVENOUS at 19:14

## 2024-09-02 RX ADMIN — HYDROCODONE BITARTRATE AND ACETAMINOPHEN 1 TABLET: 5; 325 TABLET ORAL at 20:15

## 2024-09-02 NOTE — ED PROVIDER NOTES
"History  Chief Complaint   Patient presents with    Fall     Pt reports running after [employer] , tripped and fell on right arm. \"Felt bones move\" and unable to move. Bleeding scrape to left knee. (+) ETOH     34-year-old female presenting for evaluation of right upper extremity injury.  States she was running after [employer] , tripped and fell onto her right arm.  Felt her \"bones move\" in her right arm.  States that she has broken bones before and it feels similar.  Has limited mobility with flexion of her right elbow.  States that her hand feels very cold.  RN at triage states that right fingers have a slightly blue/purple discoloration.  Patient complaining of pain primarily in her proximal forearm.  Denies difficulty moving her right shoulder or hand.  Does not think that she struck her head but is not sure as her glasses flew off.  Denies LOC, vomiting, severe headache.  Is clinically intoxicated.  Also has an abrasion to anterior left knee.  Is not up-to-date on tetanus.        Prior to Admission Medications   Prescriptions Last Dose Informant Patient Reported? Taking?   cyclobenzaprine (FLEXERIL) 5 mg tablet  Self Yes No   Sig: Take 5 mg by mouth 3 (three) times a day as needed for muscle spasms   drospirenone-ethinyl estradiol (PETE) 3-0.02 MG per tablet  Self Yes No   Sig: Take 1 tablet by mouth daily   ibuprofen (MOTRIN) 800 mg tablet  Self Yes No   Sig: Take 800 mg by mouth every 6 (six) hours as needed for mild pain   Patient not taking: Reported on 2/20/2024   ketoconazole (NIZORAL) 2 % cream  Self Yes No   Patient not taking: Reported on 2/20/2024   triamcinolone (KENALOG) 0.1 % ointment  Self Yes No   Sig: daily as needed   Patient not taking: Reported on 2/20/2024      Facility-Administered Medications: None       Past Medical History:   Diagnosis Date    Abdominal discomfort     Abnormal Pap smear of cervix     Anxiety     CFS (chronic fatigue syndrome)     COVID-19 12/2021    Endometriosis     " Endometritis     Epigastric pain     Fibromyalgia     denies    GERD (gastroesophageal reflux disease)     IBS (irritable bowel syndrome)     Major depressive disorder     Myalgia of pelvic floor     Pelvic pain     PONV (postoperative nausea and vomiting)     Psoriatic arthritis (HCC)     denies    Shortness of breath     exacerbated with rib pain    Sphincter of Oddi dysfunction     Urinary retention     denies       Past Surgical History:   Procedure Laterality Date    ABDOMINAL SURGERY      APPENDECTOMY LAPAROSCOPIC N/A 3/18/2022    Procedure: LAPAROSCOPIC APPENDECTOMY;  Surgeon: Fausto Burgos MD;  Location:  MAIN OR;  Service: General    CHOLECYSTECTOMY      DIAGNOSTIC LAPAROSCOPY      EGD AND COLONOSCOPY      ERCP      ESOPHAGOGASTRODUODENOSCOPY N/A 11/28/2017    Procedure: ESOPHAGOGASTRODUODENOSCOPY (EGD);  Surgeon: Christ Esquivel MD;  Location: [facility];  Service: Gastroenterology    LAPAROSCOPIC CHOLECYSTECTOMY      LAPAROSCOPY      for endometriosis    VA EXCISION HIDRADENITIS INGUINAL SMPL/INTRM RPR Right 11/9/2023    Procedure: EXCISION HIDRADENITIS GROIN - upper medial thigh/groin;  Surgeon: Gail Nelson MD;  Location: [facility];  Service: General    WISDOM TOOTH EXTRACTION         Family History   Problem Relation Age of Onset    No Known Problems Mother     No Known Problems Father     Alzheimer's disease Maternal Grandmother     Esophageal cancer Maternal Grandfather     Depression Paternal Grandmother     Suicidality Paternal Grandmother     No Known Problems Paternal Grandfather      I have reviewed and agree with the history as documented.    E-Cigarette/Vaping    E-Cigarette Use Current Some Day User     Comments Pt has Medical marijuana card 11/7/23      E-Cigarette/Vaping Substances    Nicotine Yes     THC Yes     CBD Yes     Flavoring No     Other No     Unknown No      Social History     Tobacco Use    Smoking status: Some Days     Current packs/day: 0.15     Average packs/day: 0.2  packs/day for 5.0 years (0.8 ttl pk-yrs)     Types: Cigarettes     Passive exposure: Never (11/8/23 1700)    Smokeless tobacco: Never   Vaping Use    Vaping status: Some Days    Substances: Nicotine, THC, CBD   Substance Use Topics    Alcohol use: Yes     Alcohol/week: 6.0 standard drinks of alcohol     Types: 6 Glasses of wine per week     Comment: weekly    Drug use: Yes     Types: Marijuana     Comment: Pt has [employer] card       Review of Systems   Constitutional:  Negative for chills and fever.   HENT:  Negative for ear pain and sore throat.    Eyes:  Negative for pain and visual disturbance.   Respiratory:  Negative for cough and shortness of breath.    Cardiovascular:  Negative for chest pain and palpitations.   Gastrointestinal:  Negative for abdominal pain and vomiting.   Genitourinary:  Negative for dysuria and hematuria.   Musculoskeletal:  Positive for arthralgias, joint swelling and myalgias. Negative for back pain.   Skin:  Positive for wound. Negative for color change and rash.   Neurological:  Negative for seizures and syncope.   All other systems reviewed and are negative.      Physical Exam  Physical Exam  Vitals and nursing note reviewed.   Constitutional:       General: She is not in acute distress.     Appearance: She is well-developed.   HENT:      Head: Normocephalic and atraumatic.   Eyes:      Conjunctiva/sclera: Conjunctivae normal.   Cardiovascular:      Rate and Rhythm: Normal rate and regular rhythm.      Heart sounds: No murmur heard.  Pulmonary:      Effort: Pulmonary effort is normal. No respiratory distress.      Breath sounds: Normal breath sounds.   Abdominal:      Palpations: Abdomen is soft.      Tenderness: There is no abdominal tenderness.   Musculoskeletal:         General: No swelling.      Right shoulder: No tenderness or bony tenderness. Decreased range of motion (near full active ROM but does appear uncomfortable).      Left shoulder: Normal.      Right upper arm:  Normal.      Left upper arm: Normal.      Right elbow: Decreased range of motion.      Left elbow: Normal.      Right forearm: Tenderness and bony tenderness present.      Left forearm: Normal.      Right wrist: Normal.      Left wrist: Normal.      Right hand: No tenderness. Normal range of motion. Normal strength. Normal sensation. Normal capillary refill. Abnormal pulse (slightly diminished radial pulse but still easily palpable).      Left hand: Normal.        Arms:       Cervical back: Neck supple.        Legs:    Skin:     General: Skin is warm and dry.      Capillary Refill: Capillary refill takes less than 2 seconds.      Comments: Right hand somewhat cool to touch compared to contralateral.   Neurological:      Mental Status: She is alert.   Psychiatric:         Mood and Affect: Mood normal.         Speech: Speech is rapid and pressured and slurred.      Comments: Clinically intoxicated.         Vital Signs  ED Triage Vitals   Temperature Pulse Respirations Blood Pressure SpO2   09/02/24 1930 09/02/24 1845 09/02/24 1845 09/02/24 1845 09/02/24 1845   98.7 °F (37.1 °C) (!) 135 18 129/81 99 %      Temp Source Heart Rate Source Patient Position - Orthostatic VS BP Location FiO2 (%)   09/02/24 1930 09/02/24 1845 09/02/24 1845 09/02/24 1845 --   Oral Monitor Sitting Left arm       Pain Score       09/02/24 1914       7           Vitals:    09/02/24 1845   BP: 129/81   Pulse: (!) 135   Patient Position - Orthostatic VS: Sitting         Visual Acuity  Visual Acuity      Flowsheet Row Most Recent Value   L Pupil Size (mm) 3   R Pupil Size (mm) 3            ED Medications  Medications   bacitracin topical ointment 1 large application (1 large application Topical Given 9/2/24 1914)   tetanus-diphtheria-acellular pertussis (BOOSTRIX) IM injection 0.5 mL (0.5 mL Intramuscular Given 9/2/24 1914)   ketorolac (TORADOL) injection 15 mg (15 mg Intramuscular Given 9/2/24 1914)   HYDROcodone-acetaminophen (NORCO) 5-325 mg per  tablet 1 tablet (1 tablet Oral Given 9/2/24 2015)       Diagnostic Studies  Results Reviewed       Procedure Component Value Units Date/Time    POCT pregnancy, urine [[id number]]  (Normal) Resulted: 09/02/24 1914    Lab Status: Final result Updated: 09/02/24 1914     EXT Preg Test, Ur Negative     Control Valid                   XR shoulder 2+ views RIGHT   ED Interpretation by Christiano Li PA-C (09/02 2007)   ED wet read: no acute osseous abnormality.       XR elbow 2 vw RIGHT   ED Interpretation by Christiano Li PA-C (09/02 2007)   ED wet read: Radial head fracture.      XR forearm 2 views RIGHT   ED Interpretation by Christiano Li PA-C (09/02 2007)   ED wet read: Radial head fracture.      XR hand 3+ views RIGHT   ED Interpretation by Christiano Li PA-C (09/02 2008)   ED wet read: no acute osseous abnormality.      CT head without contrast   Final Result by Dave Miranda MD (09/02 2144)      No acute intracranial abnormality.                  Workstation performed: [id number]                    Procedures  Splint application    Date/Time: 9/2/2024 9:20 PM    Performed by: Christiano Li PA-C  Authorized by: Christiano Li PA-C  [place] Protocol:  Procedure performed by: (LUCILLE Guzman)  Risks and benefits: risks, benefits and alternatives were discussed  Consent given by: patient  Patient understanding: patient states understanding of the procedure being performed  Patient identity confirmed: verbally with patient    Pre-procedure details:     Sensation:  Normal    Skin color:  Normal  Procedure details:     Laterality:  Right    Location:  Elbow    Elbow:  R elbow    Splint type:  Sugar tong    Supplies:  Cotton padding, elastic bandage, Ortho-Glass and sling  Post-procedure details:     Pain:  Improved    Sensation:  Normal    Skin color:  Normal    Patient tolerance of procedure:  Tolerated well, no immediate complications           ED Course                                  SBIRT 22yo+      Flowsheet Row Most Recent Value   Initial Alcohol Screen: US AUDIT-C     1. How often do you have a drink containing alcohol? 0 Filed at: 09/02/2024 1845   2. How many drinks containing alcohol do you have on a typical day you are drinking?  0 Filed at: 09/02/2024 1845   3a. Male UNDER 65: How often do you have five or more drinks on one occasion? 0 Filed at: 09/02/2024 1845   3b. FEMALE Any Age, or MALE 65+: How often do you have 4 or more drinks on one occassion? 0 Filed at: 09/02/2024 1845   Audit-C Score 0 Filed at: 09/02/2024 1845   BIANCA: How many times in the past year have you...    Used an illegal drug or used a prescription medication for non-medical reasons? Never Filed at: 09/02/2024 1845                      Medical Decision Making  34-year-old female presenting with right forearm injury after falling on outstretched arm. + ETOH.  Exam: Patient clinically intoxicated, rapid and slightly slurred speech, in mild distress due to right arm injury.  Tender to palpation of right proximal forearm.  No apparent deformity or swelling noted.  Can only flex elbow to approximately 90 degree comfortably, can go a little bit past this with more discomfort.  Right hand does feel somewhat cold compared to contralateral side and rest of right limb.  Radial pulses approximately 1+ and cap refill in all fingers is less than 2 seconds.  Sensation and motor grossly intact.  Shoulder has full active ROM although does wince with some of the movements, nontender to palpation.  Large abrasion to anterior left knee, affected knee has full active ROM, no deformity.  Workup: CT head as patient has a distracting injury and is clinically intoxicated.  POCT pregnancy.  X-rays of right shoulder, elbow, forearm, and hand.  Management: Toradol for pain.  Update tetanus.  Bacitracin for left knee abrasion.    CT head unremarkable.  Patient required additional medications for pain relief.   After some time she also was clinically sober, ambulatory with steady gait.  X-rays demonstrated an acute radial head fracture.  Patient was splinted with sugar tong splint and placed in a sling.  Neurovascularly intact, improved since arrival in ER.  Advised patient to follow up with orthopedics as soon as possible for further management, placed ambulatory referral.  Advised patient to return to an ER if developing severe pain, swelling, anesthesia, become unable to move hand/fingers.  Thoroughly educated on supportive care and expectations for this injury.  Patient expresses understanding of the condition, treatment plan, follow-up instructions, and return precautions.      Amount and/or Complexity of Data Reviewed  Labs: ordered.  Radiology: ordered and independent interpretation performed.    Risk  OTC drugs.  Prescription drug management.                 Disposition  Final diagnoses:   Right radial head fracture     Time reflects when diagnosis was documented in both MDM as applicable and the Disposition within this note       Time User Action Codes Description Comment    9/2/2024  9:47 PM Christiano Li Add [S52.121A] Right radial head fracture           ED Disposition       ED Disposition   Discharge    Condition   Stable    Date/Time   Mon Sep 2, 2024 2147    Comment   Laura Curtis discharge to home/self care.                   Follow-up Information       Follow up With Specialties Details Why Contact Info Additional Information    [facility] [place] [facility] Schedule an appointment as soon as possible for a visit   [address]  [facility] [address] [phone] [facility] [place], [facility] [address], [address] [facility], [place], [place], [address] [phone]    [facility] Emergency Department Emergency Medicine  If symptoms worsen [address] [place] [id number] [phone]  [facility] Emergency Department, [address], [place], [address]            Discharge Medication List as of 9/2/2024  9:51 PM        START taking these medications    Details   !! ibuprofen (MOTRIN) 600 mg tablet Take 1 tablet (600 mg total) by mouth every 6 (six) hours as needed for mild pain, headaches, moderate pain or fever, Starting Mon 9/2/2024, Normal      oxyCODONE (Roxicodone) 5 immediate release tablet Take 1 tablet (5 mg total) by mouth every 6 (six) hours as needed for moderate pain for up to 10 days Max Daily Amount: 20 mg, Starting Mon 9/2/2024, Until Thu 9/12/2024 at 2359, Normal       !! - Potential duplicate medications found. Please discuss with provider.        CONTINUE these medications which have NOT CHANGED    Details   cyclobenzaprine (FLEXERIL) 5 mg tablet Take 5 mg by mouth 3 (three) times a day as needed for muscle spasms, Historical Med      drospirenone-ethinyl estradiol (PETE) 3-0.02 MG per tablet Take 1 tablet by mouth daily, Historical Med      !! ibuprofen (MOTRIN) 800 mg tablet Take 800 mg by mouth every 6 (six) hours as needed for mild pain, Historical Med      ketoconazole (NIZORAL) 2 % cream Historical Med      triamcinolone (KENALOG) 0.1 % ointment daily as needed, Starting Thu 5/18/2023, Historical Med       !! - Potential duplicate medications found. Please discuss with provider.              PDMP Review       None            ED Provider  Electronically Signed by             Christiano Li PA-C  09/02/24 9245       Christiano Li PA-C  09/02/24 2242

## 2024-09-02 NOTE — Clinical Note
Laura Curtis was seen and treated in our emergency department on 9/2/2024.            Right arm immobilization.    Diagnosis: Right radial head fracture.    Laura  .    She may return on this date:          If you have any questions or concerns, please don't hesitate to call.      Christiano Li PA-C    ______________________________           _______________          _______________  Hospital Representative                              Date                                Time

## 2024-09-03 NOTE — DISCHARGE INSTRUCTIONS
As we discussed, you fractured your radial head on the right side.  We need to keep the splint in place for immobilization.  When you shower you can cover it with a trash bag, it should not get wet.  If it becomes unbearably uncomfortable, or tight enough that it feels like it is cutting off your circulation, please loosen it.  You can try to manage your pain by alternating ibuprofen and Tylenol every 3 hours.  You can apply ice over top.  If in severe pain you can take one of the oxycodone tablets every 6 hours as needed.  Please follow-up with orthopedics as soon as possible for further management of your injury.  If you develop severe pain, swelling, or become unable to feel or move your hand/finger, you should return to an ER (preferably [facility] due to more advanced resources there).

## 2024-09-05 ENCOUNTER — OFFICE VISIT (OUTPATIENT)
Dept: OBGYN CLINIC | Facility: MEDICAL CENTER | Age: 34
End: 2024-09-05
Payer: COMMERCIAL

## 2024-09-05 VITALS
SYSTOLIC BLOOD PRESSURE: 126 MMHG | HEIGHT: 66 IN | WEIGHT: 170 LBS | DIASTOLIC BLOOD PRESSURE: 86 MMHG | HEART RATE: 98 BPM | BODY MASS INDEX: 27.32 KG/M2

## 2024-09-05 DIAGNOSIS — S52.124A CLOSED NONDISPLACED FRACTURE OF HEAD OF RIGHT RADIUS, INITIAL ENCOUNTER: ICD-10-CM

## 2024-09-05 PROCEDURE — 24650 CLTX RDL HEAD/NCK FX WO MNPJ: CPT | Performed by: STUDENT IN AN ORGANIZED HEALTH CARE EDUCATION/TRAINING PROGRAM

## 2024-09-05 PROCEDURE — 99204 OFFICE O/P NEW MOD 45 MIN: CPT | Performed by: STUDENT IN AN ORGANIZED HEALTH CARE EDUCATION/TRAINING PROGRAM

## 2024-09-05 NOTE — PROGRESS NOTES
Orthopedic Surgery Office Note  Laura Curtis (34 y.o. female)   : 1990   MRN: [id number]   Encounter Date: 2024 with Dr. Antoni Barry,   Chief Complaint   Patient presents with   • Right Arm - Pain, Fracture       Assessment, Plan, & Discussion:     Right radial head fracture, DOI 2024  The patient's x-rays were reviewed today.  The patient does not require surgery and can continue with conservative measures.   The patient may utilize a sling for comfort.  May begin gentle ROM.   The patient may perform left arm golf drills.   The patient was informed it may take 2-3 months to resume full golfing.     Plan:   XR AT NEXT VISIT:  right elbow, 2-3 views  Return in about 2 weeks (around 2024) for f/u, X-rays, R, Elbow.  Refer to physical therapy at next appointment      Surgery:   No surgery planned at this time      Orders:     Diagnoses and all orders for this visit:    Closed nondisplaced fracture of head of right radius, initial encounter  -     Ambulatory Referral to Orthopedic Surgery  -     Fracture / Dislocation Treatment         History of Present Illness:     Laura Curtis is a 34 y.o. RHD female who presents for consultation following visit to the ED, here for orthopedic follow up regarding right radial head fracture sustained from mechanical fall on 2024. The patient has no previous injuries to the right elbow. She does have a history of distal wrist fracture on the right when she was 14. Pain is managed with ibuprofen, tylenol and oxycodone as needed for symptom management. The patient is having difficulty sleeping due to her pain. She is concerned the blood flow is restricted into her digits. She is also experiencing right shoulder and neck pain. The patient is an  who is required to go to sites about twice per week. The patient is not a diabetic. The patient occasionally uses nicotine products. The patient enjoys golfing. The patient is accompanied by her  "significant other for her exam today.     Review of Systems  Constitutional: Negative for fatigue, fever or loss of appetite.   HENT: Negative.    Respiratory: Negative for shortness of breath, dyspnea.    Cardiovascular: Negative for chest pain/tightness.   Gastrointestinal: Negative for abdominal pain, N/V.   Endocrine: Negative for cold/heat intolerance, unexplained weight loss/gain.   Genitourinary: Negative for flank pain, dysuria  Skin: Negative for rash.    Psychiatric/Behavioral: Negative for agitation.  All else negative unless otherwise noted in HPI    Physical Exam:   General:  /86   Pulse 98   Ht 5' 6\" (1.676 m)   Wt 77.1 kg (170 lb)   BMI 27.44 kg/m²   Cons: Appears well.  No apparent distress.  Psych: Alert. Oriented x3.  Mood and affect normal.  Eyes: PERRLA, EOMI  Resp: Normal effort.  No audible wheezing or stridor.  CV: Extremities warm and well perfused.   Abd:    No distention or guarding.   Skin: Warm. No visible lesions.  Neuro: Normal muscle tone.      Orthopedic Exam:   Right Elbow Exam  Alignment:  Normal elbow alignment and carrying angle.  Inspection:   mild swelling. resolving ecchymosis. No muscle atrophy. No deformity.  Palpation:   radial head tenderness.  ROM:  Elbow Extension limited. Elbow Flexion limited. Pronation limited. Supination limited. Full flexion of all fingers. Full extension of all fingers.  Strength:  Not tested.  Stability:  Not tested.  Tests:   none performed  Neurovascular:  Sensation intact in Ax/R/M/U nerve distributions. 2+ radial pulse. Delayed capillary refill in the fingertips. Fingers warm and perfused. AIN, PIN, UIN intact.      Imaging/Studies:     Study: XR right forearm  Date: 9/2/2024  Report: I have read and agree with the radiologist report.  I have personally reviewed imaging and my impression is as follows:   Radial head/neck fracture as discussed on concurrent report. Remainder of forearm is intact. No significant degenerative changes. No " "lytic or blastic osseous lesion    Study: XR right elbow  Date: 9/2/2024  Report: I have read and agree with the radiologist report.  I have personally reviewed imaging and my impression is as follows:   Acute radial head/neck fracture. Joint effusion. No significant degenerative changes. No lytic or blastic osseous lesion. Unremarkable soft tissues.      Fracture / Dislocation Treatment    Date/Time: 9/5/2024 2:30 PM    Performed by: Antoni Barry DO  Authorized by: Antoni Barry DO    Patient Location:  Clinic  [facility] Protocol:  procedure performed by consultantConsent: Verbal consent obtained.  Risks and benefits: risks, benefits and alternatives were discussed  Consent given by: patient  Time out: Immediately prior to procedure a \"time out\" was called to verify the correct patient, procedure, equipment, support staff and site/side marked as required.  Patient understanding: patient states understanding of the procedure being performed  Radiology Images displayed and confirmed. If images not available, report reviewed: imaging studies available  Patient identity confirmed: verbally with patient    Injury location:  Elbow  Location details:  Right elbow  Injury type:  Fracture  Fracture type: radial head    Fracture type: radial head    Distal perfusion: diminished    Neurological function: normal    Range of motion: reduced    Local anesthesia used?: No    General anesthesia used?: No    Manipulation performed?: No    Immobilization:  Sling        Medical, Surgical, Family, and Social History    The patient's medical history, family history, and social history, were reviewed and updated as appropriate.    Past Medical History:   Diagnosis Date   • Abdominal discomfort    • Abnormal Pap smear of cervix    • Anxiety    • CFS (chronic fatigue syndrome)    • COVID-19 12/2021   • Endometriosis    • Endometritis    • Epigastric pain    • Fibromyalgia     denies   • GERD (gastroesophageal reflux disease)    • " IBS (irritable bowel syndrome)    • Major depressive disorder    • Myalgia of pelvic floor    • Pelvic pain    • PONV (postoperative nausea and vomiting)    • Psoriatic arthritis (HCC)     denies   • Shortness of breath     exacerbated with rib pain   • Sphincter of Oddi dysfunction    • Urinary retention     denies     Past Surgical History:   Procedure Laterality Date   • ABDOMINAL SURGERY     • APPENDECTOMY LAPAROSCOPIC N/A 3/18/2022    Procedure: LAPAROSCOPIC APPENDECTOMY;  Surgeon: Fausto Burgos MD;  Location:  MAIN OR;  Service: General   • CHOLECYSTECTOMY     • DIAGNOSTIC LAPAROSCOPY     • EGD AND COLONOSCOPY     • ERCP     • ESOPHAGOGASTRODUODENOSCOPY N/A 11/28/2017    Procedure: ESOPHAGOGASTRODUODENOSCOPY (EGD);  Surgeon: Christ Esquivel MD;  Location: [facility];  Service: Gastroenterology   • LAPAROSCOPIC CHOLECYSTECTOMY     • LAPAROSCOPY      for endometriosis   • [place] EXCISION HIDRADENITIS INGUINAL SMPL/INTRM RPR Right 11/9/2023    Procedure: EXCISION HIDRADENITIS GROIN - upper medial thigh/groin;  Surgeon: Gail Nelson MD;  Location: [facility];  Service: General   • WISDOM TOOTH EXTRACTION       Family History   Problem Relation Age of Onset   • No Known Problems Mother    • No Known Problems Father    • Alzheimer's disease Maternal Grandmother    • Esophageal cancer Maternal Grandfather    • Depression Paternal Grandmother    • Suicidality Paternal Grandmother    • No Known Problems Paternal Grandfather      Social History     Occupational History   • Not on file   Tobacco Use   • Smoking status: Some Days     Current packs/day: 0.15     Average packs/day: 0.2 packs/day for 5.0 years (0.8 ttl pk-yrs)     Types: Cigarettes     Passive exposure: Never (11/8/23 1700)   • Smokeless tobacco: Never   Vaping Use   • Vaping status: Some Days   • Substances: Nicotine, THC, CBD   Substance and Sexual Activity   • Alcohol use: Yes     Alcohol/week: 6.0 standard drinks of alcohol     Types: 6 Glasses of wine  per week     Comment: weekly   • Drug use: Yes     Types: Marijuana     Comment: Pt has [employer] card   • Sexual activity: Yes     No Known Allergies    Current Outpatient Medications:   •  cyclobenzaprine (FLEXERIL) 5 mg tablet, Take 5 mg by mouth 3 (three) times a day as needed for muscle spasms, Disp: , Rfl:   •  drospirenone-ethinyl estradiol (PETE) 3-0.02 MG per tablet, Take 1 tablet by mouth daily, Disp: , Rfl:   •  ibuprofen (MOTRIN) 600 mg tablet, Take 1 tablet (600 mg total) by mouth every 6 (six) hours as needed for mild pain, headaches, moderate pain or fever, Disp: 30 tablet, Rfl: 0  •  oxyCODONE (Roxicodone) 5 immediate release tablet, Take 1 tablet (5 mg total) by mouth every 6 (six) hours as needed for moderate pain for up to 10 days Max Daily Amount: 20 mg, Disp: 7 tablet, Rfl: 0  •  ibuprofen (MOTRIN) 800 mg tablet, Take 800 mg by mouth every 6 (six) hours as needed for mild pain (Patient not taking: Reported on 2/20/2024), Disp: , Rfl:   •  ketoconazole (NIZORAL) 2 % cream, , Disp: , Rfl:   •  triamcinolone (KENALOG) 0.1 % ointment, daily as needed (Patient not taking: Reported on 2/20/2024), Disp: , Rfl:       This Visit:     30 minutes was spent in the coordination of care, reviewing of imaging and with the patient on the date of service    Alee Aikenibe Attestation    I,:  Alee Duval am acting as a scribe while in the presence of the attending physician.:       I,:  Antoni Barry DO personally performed the services described in this documentation    as scribed in my presence.:           Dr. Antoni Barry DO, Orthopedic Surgeon  [facility]

## 2024-09-19 ENCOUNTER — OFFICE VISIT (OUTPATIENT)
Dept: OBGYN CLINIC | Facility: MEDICAL CENTER | Age: 34
End: 2024-09-19

## 2024-09-19 ENCOUNTER — APPOINTMENT (OUTPATIENT)
Dept: RADIOLOGY | Facility: MEDICAL CENTER | Age: 34
End: 2024-09-19
Payer: COMMERCIAL

## 2024-09-19 VITALS
SYSTOLIC BLOOD PRESSURE: 136 MMHG | DIASTOLIC BLOOD PRESSURE: 86 MMHG | BODY MASS INDEX: 27.44 KG/M2 | HEIGHT: 66 IN | HEART RATE: 89 BPM

## 2024-09-19 DIAGNOSIS — S52.124A CLOSED NONDISPLACED FRACTURE OF HEAD OF RIGHT RADIUS, INITIAL ENCOUNTER: ICD-10-CM

## 2024-09-19 DIAGNOSIS — S52.124A CLOSED NONDISPLACED FRACTURE OF HEAD OF RIGHT RADIUS, INITIAL ENCOUNTER: Primary | ICD-10-CM

## 2024-09-19 PROCEDURE — 99024 POSTOP FOLLOW-UP VISIT: CPT | Performed by: STUDENT IN AN ORGANIZED HEALTH CARE EDUCATION/TRAINING PROGRAM

## 2024-09-19 PROCEDURE — 73080 X-RAY EXAM OF ELBOW: CPT

## 2024-09-19 RX ORDER — SODIUM FLUORIDE1.1%, POTASSIUM NITRATE 5% 5.8; 57.5 MG/ML; MG/ML
GEL, DENTIFRICE DENTAL
COMMUNITY
Start: 2024-09-03

## 2024-09-19 NOTE — PROGRESS NOTES
Orthopedic Surgery Office Note  Laura Curtis (34 y.o. female)   : 1990   MRN: [id number]   Encounter Date: 2024 with Dr. Antoni Barry,   Chief Complaint   Patient presents with    Right Elbow - Follow-up       Assessment, Plan, & Discussion:     Right radial head fracture, DOI 2024  The patient's x-rays were reviewed today.  The patient does not require surgery and can continue with conservative measures.   The patient may utilize a sling for comfort.  May begin gentle ROM.   The patient may perform left arm golf drills.   The patient was informed it may take 2-3 months to resume full golfing.   Related to ankle sprains, which can take a few months without healing    Plan:   XR AT NEXT VISIT:  right elbow, 2-3 views  Return in about 4 weeks (around 10/17/2024).  Refer to physical therapy, which can be delayed until further healing      Surgery:   No surgery planned at this time      Orders:     Diagnoses and all orders for this visit:    Closed nondisplaced fracture of head of right radius, initial encounter  -     XR elbow 3+ vw right; Future  -     Cancel: Ambulatory Referral to PT/OT Hand Therapy; Future  -     Ambulatory Referral to PT/OT Hand Therapy; Future    Other orders  -     Sodium Fluoride 5000 Sensitive 1.1-5 % GEL; BRUSH ONCE DAILY FOR 2 MINUTES. SPIT, DO NOT EAT OR DRINK OR RINSE FOR 30 MINUTES FOLLOWING USE         History of Present Illness:     Pain is focused more distally, not as much in the wrist, but in the distal 1/3 of the forearm. 'She has continued mobilization with gentle PROM to supination/pronation      Prior HPI:  Laura Curtis is a 34 y.o. RHD female who presents for consultation following visit to the ED, here for orthopedic follow up regarding right radial head fracture sustained from mechanical fall on 2024. The patient has no previous injuries to the right elbow. She does have a history of distal wrist fracture on the right when she was 14. Pain is managed  "with ibuprofen, tylenol and oxycodone as needed for symptom management. The patient is having difficulty sleeping due to her pain. She is concerned the blood flow is restricted into her digits. She is also experiencing right shoulder and neck pain. The patient is an  who is required to go to sites about twice per week. The patient is not a diabetic. The patient occasionally uses nicotine products. The patient enjoys golfing. The patient is accompanied by her significant other for her exam today.     Review of Systems  Constitutional: Negative for fatigue, fever or loss of appetite.   HENT: Negative.    Respiratory: Negative for shortness of breath, dyspnea.    Cardiovascular: Negative for chest pain/tightness.   Gastrointestinal: Negative for abdominal pain, N/V.   Endocrine: Negative for cold/heat intolerance, unexplained weight loss/gain.   Genitourinary: Negative for flank pain, dysuria  Skin: Negative for rash.    Psychiatric/Behavioral: Negative for agitation.  All else negative unless otherwise noted in HPI    Physical Exam:   General:  /86   Pulse 89   Ht 5' 6\" (1.676 m)   BMI 27.44 kg/m²   Cons: Appears well.  No apparent distress.  Psych: Alert. Oriented x3.  Mood and affect normal.  Eyes: PERRLA, EOMI  Resp: Normal effort.  No audible wheezing or stridor.  CV: Extremities warm and well perfused.   Abd:    No distention or guarding.   Skin: Warm. No visible lesions.  Neuro: Normal muscle tone.      Orthopedic Exam:   Right Elbow Exam  Alignment:  Normal elbow alignment and carrying angle.  Inspection:   mild swelling. resolving ecchymosis. No muscle atrophy. No deformity.  Palpation:   radial head tenderness.  ROM:  Elbow Extension limited. Elbow Flexion limited. Pronation limited. Supination limited. Full flexion of all fingers. Full extension of all fingers.  Strength:  Not tested.  Stability:  Not tested.  Tests:   none performed  Neurovascular:  Sensation intact in Ax/R/M/U nerve " distributions. 2+ radial pulse. Delayed capillary refill in the fingertips. Fingers warm and perfused. AIN, PIN, UIN intact.      Imaging/Studies:     Study: XR right elbow  Date: 9/19/24  Report: No radiology report available   I have personally reviewed imaging and my impression is as follows:   Radial head/neck fracture as discussed on concurrent report. Remainder of forearm is intact. No significant degenerative changes. No lytic or blastic osseous lesion. Unchanged alignment     Study: XR right forearm  Date: 9/2/2024  Report: I have read and agree with the radiologist report.  I have personally reviewed imaging and my impression is as follows:   Radial head/neck fracture as discussed on concurrent report. Remainder of forearm is intact. No significant degenerative changes. No lytic or blastic osseous lesion    Study: XR right elbow  Date: 9/2/2024  Report: I have read and agree with the radiologist report.  I have personally reviewed imaging and my impression is as follows:   Acute radial head/neck fracture. Joint effusion. No significant degenerative changes. No lytic or blastic osseous lesion. Unremarkable soft tissues.      Procedures      Medical, Surgical, Family, and Social History    The patient's medical history, family history, and social history, were reviewed and updated as appropriate.    Past Medical History:   Diagnosis Date    Abdominal discomfort     Abnormal Pap smear of cervix     Anxiety     CFS (chronic fatigue syndrome)     COVID-19 12/2021    Endometriosis     Endometritis     Epigastric pain     Fibromyalgia     denies    GERD (gastroesophageal reflux disease)     IBS (irritable bowel syndrome)     Major depressive disorder     Myalgia of pelvic floor     Pelvic pain     PONV (postoperative nausea and vomiting)     Psoriatic arthritis (HCC)     denies    Shortness of breath     exacerbated with rib pain    Sphincter of Oddi dysfunction     Urinary retention     denies     Past Surgical  History:   Procedure Laterality Date    ABDOMINAL SURGERY      APPENDECTOMY LAPAROSCOPIC N/A 3/18/2022    Procedure: LAPAROSCOPIC APPENDECTOMY;  Surgeon: Fausto Burgos MD;  Location:  [facility];  Service: General    CHOLECYSTECTOMY      DIAGNOSTIC LAPAROSCOPY      EGD AND COLONOSCOPY      ERCP      ESOPHAGOGASTRODUODENOSCOPY N/A 11/28/2017    Procedure: ESOPHAGOGASTRODUODENOSCOPY (EGD);  Surgeon: Christ Esquivel MD;  Location: [facility];  Service: Gastroenterology    LAPAROSCOPIC CHOLECYSTECTOMY      LAPAROSCOPY      for endometriosis    [place] EXCISION HIDRADENITIS INGUINAL SMPL/INTRM RPR Right 11/9/2023    Procedure: EXCISION HIDRADENITIS GROIN - upper medial thigh/groin;  Surgeon: Gail Nelson MD;  Location: [facility];  Service: General    WISDOM TOOTH EXTRACTION       Family History   Problem Relation Age of Onset    No Known Problems Mother     No Known Problems Father     Alzheimer's disease Maternal Grandmother     Esophageal cancer Maternal Grandfather     Depression Paternal Grandmother     Suicidality Paternal Grandmother     No Known Problems Paternal Grandfather      Social History     Occupational History    Not on file   Tobacco Use    Smoking status: Some Days     Current packs/day: 0.15     Average packs/day: 0.2 packs/day for 5.0 years (0.8 ttl pk-yrs)     Types: Cigarettes     Passive exposure: Never (11/8/23 1700)    Smokeless tobacco: Never   Vaping Use    Vaping status: Some Days    Substances: Nicotine, THC, CBD   Substance and Sexual Activity    Alcohol use: Yes     Alcohol/week: 6.0 standard drinks of alcohol     Types: 6 Glasses of wine per week     Comment: weekly    Drug use: Yes     Types: Marijuana     Comment: Pt has [employer] card    Sexual activity: Yes     No Known Allergies    Current Outpatient Medications:     cyclobenzaprine (FLEXERIL) 5 mg tablet, Take 5 mg by mouth 3 (three) times a day as needed for muscle spasms, Disp: , Rfl:     drospirenone-ethinyl estradiol (PETE)  3-0.02 MG per tablet, Take 1 tablet by mouth daily, Disp: , Rfl:     ibuprofen (MOTRIN) 600 mg tablet, Take 1 tablet (600 mg total) by mouth every 6 (six) hours as needed for mild pain, headaches, moderate pain or fever, Disp: 30 tablet, Rfl: 0    Sodium Fluoride 5000 Sensitive 1.1-5 % GEL, BRUSH ONCE DAILY FOR 2 MINUTES. SPIT, DO NOT EAT OR DRINK OR RINSE FOR 30 MINUTES FOLLOWING USE, Disp: , Rfl:     ibuprofen (MOTRIN) 800 mg tablet, Take 800 mg by mouth every 6 (six) hours as needed for mild pain (Patient not taking: Reported on 2/20/2024), Disp: , Rfl:     ketoconazole (NIZORAL) 2 % cream, , Disp: , Rfl:     triamcinolone (KENALOG) 0.1 % ointment, daily as needed (Patient not taking: Reported on 2/20/2024), Disp: , Rfl:       This Visit:     30 minutes was spent in the coordination of care, reviewing of imaging and with the patient on the date of service    Hector Swan PA-C    I, Hector Swan PA-C, scribed this note in conjunction with and in the presence of Dr. Antoni Barry DO, who performed parts of the services as described in this documentation    Dr. Antoni Barry DO, Orthopedic Surgeon  [facility]

## 2024-10-11 ENCOUNTER — OFFICE VISIT (OUTPATIENT)
Dept: OBGYN CLINIC | Facility: MEDICAL CENTER | Age: 34
End: 2024-10-11

## 2024-10-11 ENCOUNTER — APPOINTMENT (OUTPATIENT)
Dept: RADIOLOGY | Facility: MEDICAL CENTER | Age: 34
End: 2024-10-11
Payer: COMMERCIAL

## 2024-10-11 VITALS
BODY MASS INDEX: 27.44 KG/M2 | HEART RATE: 73 BPM | SYSTOLIC BLOOD PRESSURE: 138 MMHG | HEIGHT: 66 IN | DIASTOLIC BLOOD PRESSURE: 89 MMHG

## 2024-10-11 DIAGNOSIS — S52.124A CLOSED NONDISPLACED FRACTURE OF HEAD OF RIGHT RADIUS, INITIAL ENCOUNTER: Primary | ICD-10-CM

## 2024-10-11 DIAGNOSIS — S52.124A CLOSED NONDISPLACED FRACTURE OF HEAD OF RIGHT RADIUS, INITIAL ENCOUNTER: ICD-10-CM

## 2024-10-11 PROCEDURE — 73080 X-RAY EXAM OF ELBOW: CPT

## 2024-10-11 PROCEDURE — 99024 POSTOP FOLLOW-UP VISIT: CPT | Performed by: STUDENT IN AN ORGANIZED HEALTH CARE EDUCATION/TRAINING PROGRAM

## 2024-10-11 NOTE — PROGRESS NOTES
Orthopedic Surgery Office Note  Laura Curtis (34 y.o. female)   : 1990   MRN: [id number]   Encounter Date: 10/11/2024 with Dr. Antoni Barry,   Chief Complaint   Patient presents with    Right Elbow - Follow-up       Assessment, Plan, & Discussion:     Right radial head fracture, DOI 2024  The patient's X-rays were reviewed today.  The patient does not require surgery and can continue with conservative measures.   May continue with ROM. She has no specific weight bearing restrictions a may lift items which do not cause her pain to .  The patient was informed it may take 2-3 months to resume full golfing.   Continue OTC medications PRN for pain    Plan:   XR AT NEXT VISIT:  right elbow, 2-3 views  Return in about 6 weeks (around 2024) for Recheck and new right elbow X-rays.  No PT referral is necessary at this time.      Surgery:   No surgery planned at this time      Orders:     Diagnoses and all orders for this visit:    Closed nondisplaced fracture of head of right radius, initial encounter  -     XR elbow 3+ vw right; Future         History of Present Illness:     Laura Curtis is a 34 y.o. RHD female who presents follow-up regarding right radial head fracture sustained from mechanical fall on 2024.  Today the patient states that her pain is reasonably well-controlled with OTC medications as needed for symptom management.       Prior HPI:  Laura Curtis is a 34 y.o. RHD female who presents for consultation following visit to the ED, here for orthopedic follow up regarding right radial head fracture sustained from mechanical fall on 2024. The patient has no previous injuries to the right elbow. She does have a history of distal wrist fracture on the right when she was 14. Pain is managed with ibuprofen, tylenol and oxycodone as needed for symptom management. The patient is having difficulty sleeping due to her pain. She is concerned the blood flow is restricted into her digits.  "She is also experiencing right shoulder and neck pain. The patient is an  who is required to go to sites about twice per week. The patient is not a diabetic. The patient occasionally uses nicotine products. The patient enjoys golfing. The patient is accompanied by her significant other for her exam today.     Review of Systems  Constitutional: Negative for fatigue, fever or loss of appetite.   HENT: Negative.    Respiratory: Negative for shortness of breath, dyspnea.    Cardiovascular: Negative for chest pain/tightness.   Gastrointestinal: Negative for abdominal pain, N/V.   Endocrine: Negative for cold/heat intolerance, unexplained weight loss/gain.   Genitourinary: Negative for flank pain, dysuria  Skin: Negative for rash.    Psychiatric/Behavioral: Negative for agitation.  All else negative unless otherwise noted in HPI    Physical Exam:   General:  /89   Pulse 73   Ht 5' 6\" (1.676 m)   BMI 27.44 kg/m²   Cons: Appears well.  No apparent distress.  Psych: Alert. Oriented x3.  Mood and affect normal.  Eyes: PERRLA, EOMI  Resp: Normal effort.  No audible wheezing or stridor.  CV: Extremities warm and well perfused.   Abd:    No distention or guarding.   Skin: Warm. No visible lesions.  Neuro: Normal muscle tone.      Orthopedic Exam:   Right Elbow Exam  Alignment:  Normal elbow alignment and carrying angle.  Inspection:  mild swelling. No ecchymosis. No muscle atrophy. No deformity.  Palpation:   radial head tenderness.  ROM:  Elbow Extension 2 degrees. Elbow Flexion 100 degrees. Near full pronation. Full supination. Full flexion of all fingers. Full extension of all fingers.  Strength:  Not tested.  Stability:  Not tested.  Tests:   none performed  Neurovascular:  Sensation intact in Ax/R/M/U nerve distributions. 2+ radial pulse. Delayed capillary refill in the fingertips. Fingers warm and perfused. AIN, PIN, UIN intact.      Imaging/Studies:     Study: XR right elbow  Date: 10/11/24  Report: " No radiology report available   I have personally reviewed imaging and my impression is as follows:   Radial head/neck fracture which is stable in alignment with increased callus formation.  Remainder of forearm is intact. No significant degenerative changes. No lytic or blastic osseous lesion. Unchanged alignment     Study: XR right elbow  Date: 9/19/24  Report: I have read and agree with the radiologist report.  I have personally reviewed imaging and my impression is as follows:   Radial head/neck fracture as discussed on concurrent report. Remainder of forearm is intact. No significant degenerative changes. No lytic or blastic osseous lesion. Unchanged alignment     Study: XR right forearm  Date: 9/2/2024  Report: I have read and agree with the radiologist report.  I have personally reviewed imaging and my impression is as follows:   Radial head/neck fracture as discussed on concurrent report. Remainder of forearm is intact. No significant degenerative changes. No lytic or blastic osseous lesion    Study: XR right elbow  Date: 9/2/2024  Report: I have read and agree with the radiologist report.  I have personally reviewed imaging and my impression is as follows:   Acute radial head/neck fracture. Joint effusion. No significant degenerative changes. No lytic or blastic osseous lesion. Unremarkable soft tissues.      Medical, Surgical, Family, and Social History    The patient's medical history, family history, and social history, were reviewed and updated as appropriate.    Past Medical History:   Diagnosis Date    Abdominal discomfort     Abnormal Pap smear of cervix     Anxiety     CFS (chronic fatigue syndrome)     COVID-19 12/2021    Endometriosis     Endometritis     Epigastric pain     Fibromyalgia     denies    GERD (gastroesophageal reflux disease)     IBS (irritable bowel syndrome)     Major depressive disorder     Myalgia of pelvic floor     Pelvic pain     PONV (postoperative nausea and vomiting)      Psoriatic arthritis (HCC)     denies    Shortness of breath     exacerbated with rib pain    Sphincter of Oddi dysfunction     Urinary retention     denies     Past Surgical History:   Procedure Laterality Date    ABDOMINAL SURGERY      APPENDECTOMY LAPAROSCOPIC N/A 3/18/2022    Procedure: LAPAROSCOPIC APPENDECTOMY;  Surgeon: Fausto Burgos MD;  Location:  MAIN OR;  Service: General    CHOLECYSTECTOMY      DIAGNOSTIC LAPAROSCOPY      EGD AND COLONOSCOPY      ERCP      ESOPHAGOGASTRODUODENOSCOPY N/A 11/28/2017    Procedure: ESOPHAGOGASTRODUODENOSCOPY (EGD);  Surgeon: Christ Esquivel MD;  Location: [facility];  Service: Gastroenterology    LAPAROSCOPIC CHOLECYSTECTOMY      LAPAROSCOPY      for endometriosis    MD EXCISION HIDRADENITIS INGUINAL SMPL/INTRM RPR Right 11/9/2023    Procedure: EXCISION HIDRADENITIS GROIN - upper medial thigh/groin;  Surgeon: Gail Nelson MD;  Location: [facility];  Service: General    WISDOM TOOTH EXTRACTION       Family History   Problem Relation Age of Onset    No Known Problems Mother     No Known Problems Father     Alzheimer's disease Maternal Grandmother     Esophageal cancer Maternal Grandfather     Depression Paternal Grandmother     Suicidality Paternal Grandmother     No Known Problems Paternal Grandfather      Social History     Occupational History    Not on file   Tobacco Use    Smoking status: Some Days     Current packs/day: 0.15     Average packs/day: 0.2 packs/day for 5.0 years (0.8 ttl pk-yrs)     Types: Cigarettes     Passive exposure: Never (11/8/23 1700)    Smokeless tobacco: Never   Vaping Use    Vaping status: Some Days    Substances: Nicotine, THC, CBD   Substance and Sexual Activity    Alcohol use: Yes     Alcohol/week: 6.0 standard drinks of alcohol     Types: 6 Glasses of wine per week     Comment: weekly    Drug use: Yes     Types: Marijuana     Comment: Pt has [facility] card    Sexual activity: Yes     No Known Allergies    Current Outpatient  Medications:     cyclobenzaprine (FLEXERIL) 5 mg tablet, Take 5 mg by mouth 3 (three) times a day as needed for muscle spasms, Disp: , Rfl:     drospirenone-ethinyl estradiol (PETE) 3-0.02 MG per tablet, Take 1 tablet by mouth daily, Disp: , Rfl:     ibuprofen (MOTRIN) 800 mg tablet, Take 800 mg by mouth every 6 (six) hours as needed for mild pain, Disp: , Rfl:     Sodium Fluoride 5000 Sensitive 1.1-5 % GEL, BRUSH ONCE DAILY FOR 2 MINUTES. SPIT, DO NOT EAT OR DRINK OR RINSE FOR 30 MINUTES FOLLOWING USE, Disp: , Rfl:     ibuprofen (MOTRIN) 600 mg tablet, Take 1 tablet (600 mg total) by mouth every 6 (six) hours as needed for mild pain, headaches, moderate pain or fever, Disp: 30 tablet, Rfl: 0    ketoconazole (NIZORAL) 2 % cream, , Disp: , Rfl:     triamcinolone (KENALOG) 0.1 % ointment, daily as needed (Patient not taking: Reported on 2/20/2024), Disp: , Rfl:       This Visit:     30 minutes was spent in the coordination of care, reviewing of imaging and with the patient on the date of service    Gloria Gray PA-C    I, Gloria Gray PA-C, scribed this note in conjunction with and in the presence of Dr. Antoni Barry DO, who performed parts of the services as described in this documentation    Dr. Antoni Barry DO, Orthopedic Surgeon  [facility]

## 2024-12-30 NOTE — PROGRESS NOTES
Assessment/Plan:    Discussed risk of stroke with elevated BP, smoking at age 34. Pt aware that OCP must be stopped at 35 with hx of smoking. Smoking cessation reviewed. Pt has a BP cuff at home. Instructed to take BP daily and record and upload them to [employer] in 1 week.   Will check GC/CT given hx of PCB.   Recommended monthly SBE and annual CBE. ASCCP guidelines reviewed and pap collected today.  Return to the office in one year for routine annual gyn exam or sooner PRN.      Diagnoses and all orders for this visit:    Encounter for gynecological examination (general) (routine) with abnormal findings    History of endometriosis    Dyspareunia in female    PCB (post coital bleeding)        Subjective:      Patient ID: Laura Curtis is a 34 y.o. female.    This new patient presents for routine annual gyn exam.   Hx of hidradenitis on biopsy. No longer has sx.   Menses are absent on OCP. She has a hx of endometriosis dx lap with Dr. Vila. She states sx have been well managed with [facility]. She did not do well on progestin suppression and states she was hospitalized with SE after using progestin methods. BP today 140/88. Pt states elevated BP occurs every time she is in a doctor's office.   She reports long hx of dyspareunia and PCB about 3 years.   She denies breast concerns, abn discharge,  bowel/bladder dysfunction.   Sexually active, monogamous relationship, 3 yrs.    Last pap last year, per pt. Hx of abnormal paps per pt. No records available for review.             The following portions of the patient's history were reviewed and updated as appropriate: allergies, current medications, past family history, past medical history, past social history, past surgical history and problem list.    Review of Systems   Constitutional: Negative.    HENT: Negative.     Respiratory: Negative.     Cardiovascular: Negative.    Gastrointestinal: Negative.    Endocrine: Negative.    Genitourinary:  Positive for dyspareunia.  "Negative for difficulty urinating, dysuria, frequency, menstrual problem, pelvic pain, urgency, vaginal bleeding, vaginal discharge and vaginal pain.   Musculoskeletal: Negative.    Skin: Negative.    Neurological: Negative.    Psychiatric/Behavioral: Negative.           Objective:      /88 (BP Location: Left arm, Cuff Size: Standard)   Pulse 105   Ht 5' 6\" (1.676 m)   Wt 77.1 kg (170 lb)   BMI 27.44 kg/m²          Physical Exam  Vitals and nursing note reviewed. Exam conducted with a chaperone present.   Constitutional:       Appearance: Normal appearance. She is well-developed.   HENT:      Head: Normocephalic and atraumatic.   Neck:      Thyroid: No thyroid mass or thyromegaly.   Cardiovascular:      Rate and Rhythm: Normal rate and regular rhythm.      Heart sounds: Normal heart sounds.   Pulmonary:      Effort: Pulmonary effort is normal.      Breath sounds: Normal breath sounds.   Chest:   Breasts:     Breasts are symmetrical.      Right: No inverted nipple, mass, nipple discharge, skin change or tenderness.      Left: No inverted nipple, mass, nipple discharge, skin change or tenderness.   Abdominal:      General: Bowel sounds are normal.      Palpations: Abdomen is soft.      Tenderness: There is no abdominal tenderness.      Hernia: There is no hernia in the left inguinal area or right inguinal area.   Genitourinary:     General: Normal vulva.      Exam position: Supine.      Pubic Area: No rash.       Labia:         Right: No rash, tenderness, lesion or injury.         Left: No rash, tenderness, lesion or injury.       Urethra: No prolapse, urethral pain, urethral swelling or urethral lesion.      Vagina: Normal. No signs of injury and foreign body. No vaginal discharge, erythema, tenderness, bleeding, lesions or prolapsed vaginal walls.      Cervix: Friability present. No cervical motion tenderness, discharge, lesion, erythema, cervical bleeding or eversion.      Uterus: Not deviated, not " enlarged, not fixed, not tender and no uterine prolapse.       Adnexa:         Right: No mass, tenderness or fullness.          Left: No mass, tenderness or fullness.        Rectum: No external hemorrhoid.      Comments: Urethra normal without lesions  No bladder tenderness  Musculoskeletal:         General: Normal range of motion.      Cervical back: Normal range of motion and neck supple.   Lymphadenopathy:      Lower Body: No right inguinal adenopathy. No left inguinal adenopathy.   Skin:     General: Skin is warm and dry.   Neurological:      Mental Status: She is alert and oriented to person, place, and time.   Psychiatric:         Speech: Speech normal.         Behavior: Behavior normal. Behavior is cooperative.

## 2024-12-31 ENCOUNTER — OFFICE VISIT (OUTPATIENT)
Dept: GYNECOLOGY | Facility: CLINIC | Age: 34
End: 2024-12-31
Payer: COMMERCIAL

## 2024-12-31 VITALS
SYSTOLIC BLOOD PRESSURE: 140 MMHG | HEART RATE: 105 BPM | BODY MASS INDEX: 27.32 KG/M2 | WEIGHT: 170 LBS | HEIGHT: 66 IN | DIASTOLIC BLOOD PRESSURE: 88 MMHG

## 2024-12-31 DIAGNOSIS — Z01.419 ENCOUNTER FOR GYNECOLOGICAL EXAMINATION WITH PAPANICOLAOU SMEAR OF CERVIX: ICD-10-CM

## 2024-12-31 DIAGNOSIS — N94.10 DYSPAREUNIA IN FEMALE: ICD-10-CM

## 2024-12-31 DIAGNOSIS — Z11.3 SCREEN FOR STD (SEXUALLY TRANSMITTED DISEASE): ICD-10-CM

## 2024-12-31 DIAGNOSIS — N93.0 PCB (POST COITAL BLEEDING): ICD-10-CM

## 2024-12-31 DIAGNOSIS — Z87.42 HISTORY OF ENDOMETRIOSIS: ICD-10-CM

## 2024-12-31 DIAGNOSIS — Z01.411 ENCOUNTER FOR GYNECOLOGICAL EXAMINATION (GENERAL) (ROUTINE) WITH ABNORMAL FINDINGS: Primary | ICD-10-CM

## 2024-12-31 LAB
HPV HR 12 DNA CVX QL NAA+PROBE: NEGATIVE
HPV16 DNA CVX QL NAA+PROBE: NEGATIVE
HPV18 DNA CVX QL NAA+PROBE: NEGATIVE

## 2024-12-31 PROCEDURE — 87591 N.GONORRHOEAE DNA AMP PROB: CPT | Performed by: OBSTETRICS & GYNECOLOGY

## 2024-12-31 PROCEDURE — G0476 HPV COMBO ASSAY CA SCREEN: HCPCS | Performed by: OBSTETRICS & GYNECOLOGY

## 2024-12-31 PROCEDURE — 87491 CHLMYD TRACH DNA AMP PROBE: CPT | Performed by: OBSTETRICS & GYNECOLOGY

## 2024-12-31 PROCEDURE — G0145 SCR C/V CYTO,THINLAYER,RESCR: HCPCS | Performed by: OBSTETRICS & GYNECOLOGY

## 2024-12-31 PROCEDURE — S0610 ANNUAL GYNECOLOGICAL EXAMINA: HCPCS | Performed by: OBSTETRICS & GYNECOLOGY

## 2025-01-02 LAB
C TRACH DNA SPEC QL NAA+PROBE: NEGATIVE
N GONORRHOEA DNA SPEC QL NAA+PROBE: NEGATIVE

## 2025-01-03 ENCOUNTER — RESULTS FOLLOW-UP (OUTPATIENT)
Dept: GYNECOLOGY | Facility: CLINIC | Age: 35
End: 2025-01-03

## 2025-01-03 LAB
LAB AP GYN PRIMARY INTERPRETATION: NORMAL
Lab: NORMAL

## 2025-01-08 DIAGNOSIS — N80.9 ENDOMETRIOSIS: Primary | ICD-10-CM

## 2025-01-08 RX ORDER — DROSPIRENONE AND ETHINYL ESTRADIOL 0.02-3(28)
1 KIT ORAL DAILY
Qty: 84 TABLET | Refills: 0 | Status: SHIPPED | OUTPATIENT
Start: 2025-01-08

## 2025-03-21 DIAGNOSIS — N80.9 ENDOMETRIOSIS: ICD-10-CM

## 2025-03-21 RX ORDER — DROSPIRENONE AND ETHINYL ESTRADIOL 0.02-3(28)
1 KIT ORAL DAILY
Qty: 84 TABLET | Refills: 0 | Status: SHIPPED | OUTPATIENT
Start: 2025-03-21

## 2025-03-21 NOTE — TELEPHONE ENCOUNTER
Medication: drospirenone-ethinyl estradiol (PETE) 3-0.02 MG per tablet     Dose/Frequency:  Take 1 tablet by mouth daily,     Quantity: 84 tablet    Pharmacy: [facility]     Office:   [] PCP/Provider -   [x] Speciality/Provider - RICARDO Rodrigez     Does the patient have enough for 3 days?   [] Yes   [x] No - Send as HP to POD     Patient needed to reschedule 3/24/25 visit and will need a refill sent to the pharmacy.

## 2025-06-06 DIAGNOSIS — N80.9 ENDOMETRIOSIS: ICD-10-CM

## 2025-06-06 RX ORDER — DROSPIRENONE AND ETHINYL ESTRADIOL 0.02-3(28)
1 KIT ORAL DAILY
Qty: 84 TABLET | Refills: 1 | Status: SHIPPED | OUTPATIENT
Start: 2025-06-06

## 2025-07-02 ENCOUNTER — APPOINTMENT (OUTPATIENT)
Dept: LAB | Facility: MEDICAL CENTER | Age: 35
End: 2025-07-02
Payer: COMMERCIAL

## 2025-07-02 DIAGNOSIS — I10 ESSENTIAL HYPERTENSION, MALIGNANT: ICD-10-CM

## 2025-07-02 DIAGNOSIS — N95.1 SYMPTOMATIC MENOPAUSAL OR FEMALE CLIMACTERIC STATES: ICD-10-CM

## 2025-07-02 DIAGNOSIS — E78.00 PURE HYPERCHOLESTEROLEMIA: ICD-10-CM

## 2025-07-02 LAB
25(OH)D3 SERPL-MCNC: 24.8 NG/ML (ref 30–100)
ALBUMIN SERPL BCG-MCNC: 3.9 G/DL (ref 3.5–5)
ALP SERPL-CCNC: 48 U/L (ref 34–104)
ALT SERPL W P-5'-P-CCNC: 18 U/L (ref 7–52)
ANION GAP SERPL CALCULATED.3IONS-SCNC: 10 MMOL/L (ref 4–13)
AST SERPL W P-5'-P-CCNC: 16 U/L (ref 13–39)
BACTERIA UR QL AUTO: ABNORMAL /HPF
BASOPHILS # BLD AUTO: 0.06 THOUSANDS/ÂΜL (ref 0–0.1)
BASOPHILS NFR BLD AUTO: 1 % (ref 0–1)
BILIRUB SERPL-MCNC: 0.82 MG/DL (ref 0.2–1)
BILIRUB UR QL STRIP: NEGATIVE
BUN SERPL-MCNC: 15 MG/DL (ref 5–25)
CALCIUM SERPL-MCNC: 8.5 MG/DL (ref 8.4–10.2)
CHLORIDE SERPL-SCNC: 104 MMOL/L (ref 96–108)
CHOLEST SERPL-MCNC: 168 MG/DL (ref ?–200)
CLARITY UR: CLEAR
CO2 SERPL-SCNC: 22 MMOL/L (ref 21–32)
COLOR UR: ABNORMAL
CORTIS SERPL-MCNC: 14.5 UG/DL
CREAT SERPL-MCNC: 0.84 MG/DL (ref 0.6–1.3)
EOSINOPHIL # BLD AUTO: 0.23 THOUSAND/ÂΜL (ref 0–0.61)
EOSINOPHIL NFR BLD AUTO: 3 % (ref 0–6)
ERYTHROCYTE [DISTWIDTH] IN BLOOD BY AUTOMATED COUNT: 12.7 % (ref 11.6–15.1)
EST. AVERAGE GLUCOSE BLD GHB EST-MCNC: 100 MG/DL
ESTRADIOL SERPL-MCNC: <15 PG/ML
FOLATE SERPL-MCNC: 9.3 NG/ML
GFR SERPL CREATININE-BSD FRML MDRD: 90 ML/MIN/1.73SQ M
GLUCOSE P FAST SERPL-MCNC: 106 MG/DL (ref 65–99)
GLUCOSE UR STRIP-MCNC: NEGATIVE MG/DL
HBA1C MFR BLD: 5.1 %
HCT VFR BLD AUTO: 42 % (ref 34.8–46.1)
HDLC SERPL-MCNC: 74 MG/DL
HGB BLD-MCNC: 13.7 G/DL (ref 11.5–15.4)
HGB UR QL STRIP.AUTO: ABNORMAL
IMM GRANULOCYTES # BLD AUTO: 0.02 THOUSAND/UL (ref 0–0.2)
IMM GRANULOCYTES NFR BLD AUTO: 0 % (ref 0–2)
KETONES UR STRIP-MCNC: NEGATIVE MG/DL
LDLC SERPL CALC-MCNC: 55 MG/DL (ref 0–100)
LEUKOCYTE ESTERASE UR QL STRIP: NEGATIVE
LH SERPL-ACNC: 0.2 MIU/ML
LYMPHOCYTES # BLD AUTO: 2.29 THOUSANDS/ÂΜL (ref 0.6–4.47)
LYMPHOCYTES NFR BLD AUTO: 31 % (ref 14–44)
MCH RBC QN AUTO: 29.2 PG (ref 26.8–34.3)
MCHC RBC AUTO-ENTMCNC: 32.6 G/DL (ref 31.4–37.4)
MCV RBC AUTO: 90 FL (ref 82–98)
MONOCYTES # BLD AUTO: 0.45 THOUSAND/ÂΜL (ref 0.17–1.22)
MONOCYTES NFR BLD AUTO: 6 % (ref 4–12)
NEUTROPHILS # BLD AUTO: 4.4 THOUSANDS/ÂΜL (ref 1.85–7.62)
NEUTS SEG NFR BLD AUTO: 59 % (ref 43–75)
NITRITE UR QL STRIP: NEGATIVE
NON-SQ EPI CELLS URNS QL MICRO: ABNORMAL /HPF
NONHDLC SERPL-MCNC: 94 MG/DL
NRBC BLD AUTO-RTO: 0 /100 WBCS
PH UR STRIP.AUTO: 6 [PH]
PLATELET # BLD AUTO: 295 THOUSANDS/UL (ref 149–390)
PMV BLD AUTO: 10.6 FL (ref 8.9–12.7)
POTASSIUM SERPL-SCNC: 4.5 MMOL/L (ref 3.5–5.3)
PROT SERPL-MCNC: 6.3 G/DL (ref 6.4–8.4)
PROT UR STRIP-MCNC: NEGATIVE MG/DL
RBC # BLD AUTO: 4.69 MILLION/UL (ref 3.81–5.12)
RBC #/AREA URNS AUTO: ABNORMAL /HPF
SODIUM SERPL-SCNC: 136 MMOL/L (ref 135–147)
SP GR UR STRIP.AUTO: 1.02 (ref 1–1.03)
T4 FREE SERPL-MCNC: 0.64 NG/DL (ref 0.61–1.12)
TESTOST SERPL-MSCNC: 24 NG/DL (ref 0–75)
TRIGL SERPL-MCNC: 197 MG/DL (ref ?–150)
TSH SERPL DL<=0.05 MIU/L-ACNC: 1.88 UIU/ML (ref 0.45–4.5)
UROBILINOGEN UR STRIP-ACNC: <2 MG/DL
VIT B12 SERPL-MCNC: 159 PG/ML (ref 180–914)
WBC # BLD AUTO: 7.45 THOUSAND/UL (ref 4.31–10.16)
WBC #/AREA URNS AUTO: ABNORMAL /HPF

## 2025-07-02 PROCEDURE — 84403 ASSAY OF TOTAL TESTOSTERONE: CPT

## 2025-07-02 PROCEDURE — 82306 VITAMIN D 25 HYDROXY: CPT

## 2025-07-02 PROCEDURE — 85025 COMPLETE CBC W/AUTO DIFF WBC: CPT

## 2025-07-02 PROCEDURE — 82533 TOTAL CORTISOL: CPT

## 2025-07-02 PROCEDURE — 82670 ASSAY OF TOTAL ESTRADIOL: CPT

## 2025-07-02 PROCEDURE — 80053 COMPREHEN METABOLIC PANEL: CPT

## 2025-07-02 PROCEDURE — 84443 ASSAY THYROID STIM HORMONE: CPT

## 2025-07-02 PROCEDURE — 81001 URINALYSIS AUTO W/SCOPE: CPT

## 2025-07-02 PROCEDURE — 83002 ASSAY OF GONADOTROPIN (LH): CPT

## 2025-07-02 PROCEDURE — 36415 COLL VENOUS BLD VENIPUNCTURE: CPT

## 2025-07-02 PROCEDURE — 83036 HEMOGLOBIN GLYCOSYLATED A1C: CPT

## 2025-07-02 PROCEDURE — 80061 LIPID PANEL: CPT

## 2025-07-02 PROCEDURE — 82607 VITAMIN B-12: CPT

## 2025-07-02 PROCEDURE — 84439 ASSAY OF FREE THYROXINE: CPT

## 2025-07-02 PROCEDURE — 82746 ASSAY OF FOLIC ACID SERUM: CPT

## 2025-07-17 ENCOUNTER — TELEPHONE (OUTPATIENT)
Dept: GYNECOLOGY | Facility: CLINIC | Age: 35
End: 2025-07-17

## 2025-07-17 ENCOUNTER — PATIENT MESSAGE (OUTPATIENT)
Dept: GYNECOLOGY | Facility: CLINIC | Age: 35
End: 2025-07-17

## 2025-07-17 DIAGNOSIS — Z30.41 ENCOUNTER FOR SURVEILLANCE OF CONTRACEPTIVE PILLS: Primary | ICD-10-CM

## 2025-07-17 RX ORDER — ACETAMINOPHEN AND CODEINE PHOSPHATE 120; 12 MG/5ML; MG/5ML
1 SOLUTION ORAL DAILY
Qty: 84 TABLET | Refills: 0 | Status: SHIPPED | OUTPATIENT
Start: 2025-07-17

## 2025-07-18 NOTE — TELEPHONE ENCOUNTER
LM on VM to return call. Unfortunately, given the fact that she is a smoker, she cannot have an estrogen containing pill at age 35. .

## (undated) DEVICE — SINGLE PORT MANIFOLD: Brand: NEPTUNE 2

## (undated) DEVICE — 2963 MEDIPORE SOFT CLOTH TAPE 3 IN X 10 YD 12 RLS/CS: Brand: 3M™ MEDIPORE™

## (undated) DEVICE — CHLORAPREP HI-LITE 26ML ORANGE

## (undated) DEVICE — 10FR FRAZIER SUCTION HANDLE: Brand: CARDINAL HEALTH

## (undated) DEVICE — TROCAR: Brand: KII FIOS FIRST ENTRY

## (undated) DEVICE — 3M™ STERI-STRIP™ REINFORCED ADHESIVE SKIN CLOSURES, R1547, 1/2 IN X 4 IN (12 MM X 100 MM), 6 STRIPS/ENVELOPE: Brand: 3M™ STERI-STRIP™

## (undated) DEVICE — ENDOPATH PNEUMONEEDLE INSUFFLATION NEEDLES WITH LUER LOCK CONNECTORS 120MM: Brand: ENDOPATH

## (undated) DEVICE — SCD SEQUENTIAL COMPRESSION COMFORT SLEEVE MEDIUM KNEE LENGTH: Brand: KENDALL SCD

## (undated) DEVICE — SUT MONOCRYL 4-0 PS-2 27 IN Y426H

## (undated) DEVICE — ELECTRODE NEEDLE MOD E-Z CLEAN 2.75IN 7CM -0013M

## (undated) DEVICE — NEEDLE HYPO 22G X 1-1/2 IN

## (undated) DEVICE — ENDOPATH 5MM CURVED SCISSORS WITH MONOPOLAR CAUTERY: Brand: ENDOPATH

## (undated) DEVICE — GLOVE SRG BIOGEL 6.5

## (undated) DEVICE — 2000CC GUARDIAN II: Brand: GUARDIAN

## (undated) DEVICE — BETHLEHEM UNIVERSAL MINOR GEN: Brand: CARDINAL HEALTH

## (undated) DEVICE — SUT VICRYL 0 UR-6 27 IN J603H

## (undated) DEVICE — DRESSING MEPILEX AG BORDER POST-OP 4 X 6 IN

## (undated) DEVICE — ECHELON FLEX POWERED PLUS ARTICULATING ENDOSCOPIC LINEAR CUTTER , 60MM: Brand: ECHELON FLEX

## (undated) DEVICE — TUBING SUCTION 5MM X 12 FT

## (undated) DEVICE — SWABSTCK, BENZOIN TINCTURE, 1/PK, STRL: Brand: APLICARE

## (undated) DEVICE — CRADLE EXTREMITY UNIVERSAL CONTOURED

## (undated) DEVICE — NEEDLE BLUNT 18 G X 1 1/2IN

## (undated) DEVICE — SUT VICRYL 3-0 SH 27 IN J416H

## (undated) DEVICE — STERILE POLYISOPRENE POWDER-FREE SURGICAL GLOVES: Brand: PROTEXIS

## (undated) DEVICE — SYRINGE 10ML LL

## (undated) DEVICE — DRAPE EQUIPMENT RF WAND

## (undated) DEVICE — IRRIG ENDO FLO TUBING

## (undated) DEVICE — DISPOSABLE OR TOWEL: Brand: CARDINAL HEALTH

## (undated) DEVICE — GLOVE INDICATOR PI UNDERGLOVE SZ 6.5 BLUE

## (undated) DEVICE — PLUMEPEN PRO 10FT

## (undated) DEVICE — GAUZE SPONGES,16 PLY: Brand: CURITY

## (undated) DEVICE — INTENDED FOR TISSUE SEPARATION, AND OTHER PROCEDURES THAT REQUIRE A SHARP SURGICAL BLADE TO PUNCTURE OR CUT.: Brand: BARD-PARKER SAFETY BLADES SIZE 11, STERILE

## (undated) DEVICE — SYRINGE 30ML LL

## (undated) DEVICE — ALLENTOWN LAP CHOLE APP PACK: Brand: CARDINAL HEALTH

## (undated) DEVICE — ENDOPATH ECHELON ENDOSCOPIC LINEAR CUTTER RELOADS, WHITE, 60MM: Brand: ECHELON ENDOPATH